# Patient Record
Sex: FEMALE | Race: WHITE | Employment: UNEMPLOYED | URBAN - METROPOLITAN AREA
[De-identification: names, ages, dates, MRNs, and addresses within clinical notes are randomized per-mention and may not be internally consistent; named-entity substitution may affect disease eponyms.]

---

## 2023-02-12 ENCOUNTER — APPOINTMENT (OUTPATIENT)
Dept: CT IMAGING | Age: 68
DRG: 885 | End: 2023-02-12
Attending: STUDENT IN AN ORGANIZED HEALTH CARE EDUCATION/TRAINING PROGRAM
Payer: MEDICARE

## 2023-02-12 ENCOUNTER — HOSPITAL ENCOUNTER (INPATIENT)
Age: 68
LOS: 4 days | Discharge: PSYCHIATRIC OR SUBSTANCE USE FACILITY | DRG: 885 | End: 2023-02-17
Attending: STUDENT IN AN ORGANIZED HEALTH CARE EDUCATION/TRAINING PROGRAM | Admitting: FAMILY MEDICINE
Payer: MEDICARE

## 2023-02-12 DIAGNOSIS — F05 ACUTE CONFUSIONAL STATE: ICD-10-CM

## 2023-02-12 DIAGNOSIS — R48.8 PERSEVERATION: ICD-10-CM

## 2023-02-12 DIAGNOSIS — R48.1: ICD-10-CM

## 2023-02-12 DIAGNOSIS — R41.3 ACUTE MEMORY IMPAIRMENT: Primary | ICD-10-CM

## 2023-02-12 LAB
ALBUMIN SERPL-MCNC: 4.1 G/DL (ref 3.5–5)
ALBUMIN/GLOB SERPL: 1.1 (ref 1.1–2.2)
ALP SERPL-CCNC: 51 U/L (ref 45–117)
ALT SERPL-CCNC: 43 U/L (ref 12–78)
ANION GAP SERPL CALC-SCNC: 5 MMOL/L (ref 5–15)
APPEARANCE UR: CLEAR
AST SERPL-CCNC: 33 U/L (ref 15–37)
BACTERIA URNS QL MICRO: NEGATIVE /HPF
BASOPHILS # BLD: 0.1 K/UL (ref 0–0.1)
BASOPHILS NFR BLD: 1 % (ref 0–1)
BILIRUB SERPL-MCNC: 1.3 MG/DL (ref 0.2–1)
BILIRUB UR QL: NEGATIVE
BUN SERPL-MCNC: 21 MG/DL (ref 6–20)
BUN/CREAT SERPL: 25 (ref 12–20)
CA-I BLD-MCNC: 1.22 MMOL/L (ref 1.12–1.32)
CALCIUM SERPL-MCNC: 9.5 MG/DL (ref 8.5–10.1)
CHLORIDE BLD-SCNC: 104 MMOL/L (ref 98–107)
CHLORIDE SERPL-SCNC: 106 MMOL/L (ref 97–108)
CO2 SERPL-SCNC: 27 MMOL/L (ref 21–32)
COLOR UR: NORMAL
COMMENT, HOLDF: NORMAL
CREAT BLD-MCNC: 0.7 MG/DL (ref 0.6–1.3)
CREAT SERPL-MCNC: 0.84 MG/DL (ref 0.55–1.02)
DIFFERENTIAL METHOD BLD: ABNORMAL
EOSINOPHIL # BLD: 0.2 K/UL (ref 0–0.4)
EOSINOPHIL NFR BLD: 2 % (ref 0–7)
EPITH CASTS URNS QL MICRO: NORMAL /LPF
ERYTHROCYTE [DISTWIDTH] IN BLOOD BY AUTOMATED COUNT: 13.5 % (ref 11.5–14.5)
GLOBULIN SER CALC-MCNC: 3.6 G/DL (ref 2–4)
GLUCOSE BLD STRIP.AUTO-MCNC: 130 MG/DL (ref 65–117)
GLUCOSE BLD-MCNC: 122 MG/DL (ref 65–100)
GLUCOSE SERPL-MCNC: 122 MG/DL (ref 65–100)
GLUCOSE UR STRIP.AUTO-MCNC: NEGATIVE MG/DL
HCT VFR BLD AUTO: 36.4 % (ref 35–47)
HGB BLD-MCNC: 11.8 G/DL (ref 11.5–16)
HGB UR QL STRIP: NEGATIVE
HYALINE CASTS URNS QL MICRO: NORMAL /LPF (ref 0–5)
IMM GRANULOCYTES # BLD AUTO: 0 K/UL (ref 0–0.04)
IMM GRANULOCYTES NFR BLD AUTO: 1 % (ref 0–0.5)
INR PPP: 1 (ref 0.9–1.1)
KETONES UR QL STRIP.AUTO: NEGATIVE MG/DL
LEUKOCYTE ESTERASE UR QL STRIP.AUTO: NEGATIVE
LYMPHOCYTES # BLD: 0.9 K/UL (ref 0.8–3.5)
LYMPHOCYTES NFR BLD: 14 % (ref 12–49)
MCH RBC QN AUTO: 32.1 PG (ref 26–34)
MCHC RBC AUTO-ENTMCNC: 32.4 G/DL (ref 30–36.5)
MCV RBC AUTO: 98.9 FL (ref 80–99)
MONOCYTES # BLD: 0.6 K/UL (ref 0–1)
MONOCYTES NFR BLD: 9 % (ref 5–13)
NEUTS SEG # BLD: 4.8 K/UL (ref 1.8–8)
NEUTS SEG NFR BLD: 73 % (ref 32–75)
NITRITE UR QL STRIP.AUTO: NEGATIVE
NRBC # BLD: 0 K/UL (ref 0–0.01)
NRBC BLD-RTO: 0 PER 100 WBC
PH UR STRIP: 7 (ref 5–8)
PLATELET # BLD AUTO: 308 K/UL (ref 150–400)
PMV BLD AUTO: 10.3 FL (ref 8.9–12.9)
POTASSIUM BLD-SCNC: 3.7 MMOL/L (ref 3.5–5.1)
POTASSIUM SERPL-SCNC: 3.9 MMOL/L (ref 3.5–5.1)
PROT SERPL-MCNC: 7.7 G/DL (ref 6.4–8.2)
PROT UR STRIP-MCNC: NEGATIVE MG/DL
PROTHROMBIN TIME: 10.7 SEC (ref 9–11.1)
RBC # BLD AUTO: 3.68 M/UL (ref 3.8–5.2)
RBC #/AREA URNS HPF: NORMAL /HPF (ref 0–5)
SAMPLES BEING HELD,HOLD: NORMAL
SERVICE CMNT-IMP: ABNORMAL
SERVICE CMNT-IMP: ABNORMAL
SODIUM BLD-SCNC: 141 MMOL/L (ref 136–145)
SODIUM SERPL-SCNC: 138 MMOL/L (ref 136–145)
UR CULT HOLD, URHOLD: NORMAL
UROBILINOGEN UR QL STRIP.AUTO: 0.2 EU/DL (ref 0.2–1)
WBC # BLD AUTO: 6.5 K/UL (ref 3.6–11)
WBC URNS QL MICRO: NORMAL /HPF (ref 0–4)

## 2023-02-12 PROCEDURE — 80053 COMPREHEN METABOLIC PANEL: CPT

## 2023-02-12 PROCEDURE — 82962 GLUCOSE BLOOD TEST: CPT

## 2023-02-12 PROCEDURE — 74011000636 HC RX REV CODE- 636: Performed by: RADIOLOGY

## 2023-02-12 PROCEDURE — 93005 ELECTROCARDIOGRAM TRACING: CPT

## 2023-02-12 PROCEDURE — 70450 CT HEAD/BRAIN W/O DYE: CPT

## 2023-02-12 PROCEDURE — 70496 CT ANGIOGRAPHY HEAD: CPT

## 2023-02-12 PROCEDURE — 80047 BASIC METABLC PNL IONIZED CA: CPT

## 2023-02-12 PROCEDURE — 85610 PROTHROMBIN TIME: CPT

## 2023-02-12 PROCEDURE — 36415 COLL VENOUS BLD VENIPUNCTURE: CPT

## 2023-02-12 PROCEDURE — 4A03X5D MEASUREMENT OF ARTERIAL FLOW, INTRACRANIAL, EXTERNAL APPROACH: ICD-10-PCS | Performed by: STUDENT IN AN ORGANIZED HEALTH CARE EDUCATION/TRAINING PROGRAM

## 2023-02-12 PROCEDURE — 81001 URINALYSIS AUTO W/SCOPE: CPT

## 2023-02-12 PROCEDURE — 99285 EMERGENCY DEPT VISIT HI MDM: CPT

## 2023-02-12 PROCEDURE — 85025 COMPLETE CBC W/AUTO DIFF WBC: CPT

## 2023-02-12 RX ADMIN — IOPAMIDOL 100 ML: 755 INJECTION, SOLUTION INTRAVENOUS at 20:19

## 2023-02-13 ENCOUNTER — APPOINTMENT (OUTPATIENT)
Dept: MRI IMAGING | Age: 68
DRG: 885 | End: 2023-02-13
Attending: STUDENT IN AN ORGANIZED HEALTH CARE EDUCATION/TRAINING PROGRAM
Payer: MEDICARE

## 2023-02-13 PROBLEM — R41.82 ALTERED MENTAL STATUS: Status: ACTIVE | Noted: 2023-02-13

## 2023-02-13 LAB
AMPHET UR QL SCN: NEGATIVE
BARBITURATES UR QL SCN: NEGATIVE
BENZODIAZ UR QL: NEGATIVE
CANNABINOIDS UR QL SCN: NEGATIVE
COCAINE UR QL SCN: NEGATIVE
COMMENT, HOLDF: NORMAL
DRUG SCRN COMMENT,DRGCM: NORMAL
FOLATE SERPL-MCNC: 31.4 NG/ML (ref 5–21)
METHADONE UR QL: NEGATIVE
OPIATES UR QL: NEGATIVE
PCP UR QL: NEGATIVE
SAMPLES BEING HELD,HOLD: NORMAL
TSH SERPL DL<=0.05 MIU/L-ACNC: 1.75 UIU/ML (ref 0.36–3.74)
VIT B12 SERPL-MCNC: 317 PG/ML (ref 193–986)

## 2023-02-13 PROCEDURE — 82746 ASSAY OF FOLIC ACID SERUM: CPT

## 2023-02-13 PROCEDURE — 70551 MRI BRAIN STEM W/O DYE: CPT

## 2023-02-13 PROCEDURE — 97165 OT EVAL LOW COMPLEX 30 MIN: CPT

## 2023-02-13 PROCEDURE — 74011250637 HC RX REV CODE- 250/637: Performed by: FAMILY MEDICINE

## 2023-02-13 PROCEDURE — 97535 SELF CARE MNGMENT TRAINING: CPT

## 2023-02-13 PROCEDURE — 80307 DRUG TEST PRSMV CHEM ANLYZR: CPT

## 2023-02-13 PROCEDURE — 84443 ASSAY THYROID STIM HORMONE: CPT

## 2023-02-13 PROCEDURE — 97161 PT EVAL LOW COMPLEX 20 MIN: CPT

## 2023-02-13 PROCEDURE — 65270000029 HC RM PRIVATE

## 2023-02-13 PROCEDURE — 36415 COLL VENOUS BLD VENIPUNCTURE: CPT

## 2023-02-13 PROCEDURE — 74011250637 HC RX REV CODE- 250/637: Performed by: PHYSICIAN ASSISTANT

## 2023-02-13 PROCEDURE — 92523 SPEECH SOUND LANG COMPREHEN: CPT

## 2023-02-13 PROCEDURE — 99222 1ST HOSP IP/OBS MODERATE 55: CPT | Performed by: PSYCHIATRY & NEUROLOGY

## 2023-02-13 PROCEDURE — 97530 THERAPEUTIC ACTIVITIES: CPT

## 2023-02-13 PROCEDURE — 82607 VITAMIN B-12: CPT

## 2023-02-13 RX ORDER — ATORVASTATIN CALCIUM 10 MG/1
40 TABLET, FILM COATED ORAL
Status: DISCONTINUED | OUTPATIENT
Start: 2023-02-13 | End: 2023-02-13

## 2023-02-13 RX ORDER — FOLIC ACID 1 MG/1
3 TABLET ORAL DAILY
COMMUNITY

## 2023-02-13 RX ORDER — LEUCOVORIN CALCIUM 5 MG/1
TABLET ORAL
COMMUNITY

## 2023-02-13 RX ORDER — ACETAMINOPHEN 325 MG/1
650 TABLET ORAL
Status: DISCONTINUED | OUTPATIENT
Start: 2023-02-13 | End: 2023-02-17 | Stop reason: HOSPADM

## 2023-02-13 RX ORDER — ACETAMINOPHEN 650 MG/1
650 SUPPOSITORY RECTAL
Status: DISCONTINUED | OUTPATIENT
Start: 2023-02-13 | End: 2023-02-17 | Stop reason: HOSPADM

## 2023-02-13 RX ORDER — LISINOPRIL 30 MG/1
30 TABLET ORAL DAILY
COMMUNITY

## 2023-02-13 RX ORDER — ENOXAPARIN SODIUM 100 MG/ML
40 INJECTION SUBCUTANEOUS EVERY 24 HOURS
Status: DISCONTINUED | OUTPATIENT
Start: 2023-02-14 | End: 2023-02-17 | Stop reason: HOSPADM

## 2023-02-13 RX ORDER — METHOTREXATE 2.5 MG/1
15 TABLET ORAL
COMMUNITY

## 2023-02-13 RX ORDER — FOLIC ACID 1 MG/1
3 TABLET ORAL DAILY
Status: DISCONTINUED | OUTPATIENT
Start: 2023-02-13 | End: 2023-02-17 | Stop reason: HOSPADM

## 2023-02-13 RX ADMIN — ATORVASTATIN CALCIUM 40 MG: 10 TABLET, FILM COATED ORAL at 05:16

## 2023-02-13 RX ADMIN — LISINOPRIL 30 MG: 20 TABLET ORAL at 08:44

## 2023-02-13 RX ADMIN — FOLIC ACID 3 MG: 1 TABLET ORAL at 13:14

## 2023-02-13 NOTE — PROGRESS NOTES
Problem: Mobility Impaired (Adult and Pediatric)  Goal: *Acute Goals and Plan of Care (Insert Text)  Description: FUNCTIONAL STATUS PRIOR TO ADMISSION: Patient was independent and active without use of DME.    HOME SUPPORT PRIOR TO ADMISSION: The patient lived alone. Physical Therapy Goals  Initiated 2/13/2023  1. Patient will perform sit to stand with supervision within 7 day(s). 2.  Patient will ambulate with supervision for 300 feet with the least restrictive device within 7 day(s). 3.  Patient will ascend/descend 14 stairs with  handrail(s) with supervision within 7 day(s). Outcome: Not Met     PHYSICAL THERAPY EVALUATION  Patient: Tereso Castillo (69 y.o. female)  Date: 2/13/2023  Primary Diagnosis: Altered mental status [R41.82]       Precautions:  Fall    ASSESSMENT  Based on the objective data described below, the patient presents with impaired functional mobility related to her AMS. At baseline pt is reported to be independent w/o a device, exercises with a , lives alone, and is A&Ox4. Received pt in the ED with her sister-in-law in the room. Pt is confused and repeating \"I am dead\". She follows commands with some difficulty and often stated \"I can't\" when asked to perform a task then later completing the task. She raises her arms above her head then unable to lower them until provided w/ tactile cues. Pt's strength and ROM are grossly functional.  Functionally she is SBA to Min A for transfers and ambulation (Min A to initiate movement/ tasks). Patient requested bilateral hand held assist to walk to the bathroom. Observed her ambulating in the bathroom w/o external support and with steady gait. She did have one episode of unsteadiness w/ path deviation when turning the corner to walk back to her room. She was able to self correct w/o loss of balance. Pt's sister in law indicates gait observed is not pt's baseline. Therapy will follow for a few sessions.   Not sure of the impact we will have given pt's AMS. Current Level of Function Impacting Discharge (mobility/balance): Essentially SBA to ambulate though pt requested bilateral hand held assist to ambulate to the bathroom then later ambulated back to the room with SBA, mild path deviation w turn,  no overt loss of balance. Functional Outcome Measure: The patient scored 21/28 on the Tinetti outcome measure which is indicative of moderate fall risk. Other factors to consider for discharge: lives alone, appears to have supportive family, mentation     Patient will benefit from skilled therapy intervention to address the above noted impairments. PLAN :  Recommendations and Planned Interventions: transfer training, gait training, neuromuscular re-education, patient and family training/education, and therapeutic activities      Frequency/Duration: Patient will be followed by physical therapy:  3 times a week to address goals. Recommendation for discharge: (in order for the patient to meet his/her long term goals)  To be determined: Anticipate no therapy follow up needs    This discharge recommendation:  Has not yet been discussed the attending provider and/or case management    IF patient discharges home will need the following DME: none anticipated         SUBJECTIVE:   Patient stated I am dead.     OBJECTIVE DATA SUMMARY:   HISTORY:    History reviewed. No pertinent past medical history. History reviewed. No pertinent surgical history.     Home Situation  Home Environment: Private residence  # Steps to Enter: 6  Rails to Enter: Yes  One/Two Story Residence: Other (Comment) (3 story home; bedroom on 2nd floor)  # of Interior Steps: 14  Living Alone: Yes  Support Systems: Other Family Member(s) (sister at bedside)  Patient Expects to be Discharged to[de-identified] Home  Current DME Used/Available at Home:  (shower bench)  Tub or Shower Type: Shower    EXAMINATION/PRESENTATION/DECISION MAKING:   Critical Behavior:  Neurologic State: Alert  Orientation Level: Oriented to person, Disoriented to situation, Disoriented to time, Disoriented to place  Cognition: Impaired decision making, Decreased command following  Safety/Judgement: Decreased awareness of environment, Decreased awareness of need for assistance, Decreased awareness of need for safety, Decreased insight into deficits  Hearing: Auditory  Auditory Impairment: None    Range Of Motion:  AROM: Within functional limits           PROM: Within functional limits           Strength:    Strength: Within functional limits                    Tone & Sensation:   Tone: Normal              Sensation: Intact               Coordination:  Coordination: Within functional limits  Vision:   Acuity: Within Defined Limits  Functional Mobility:  Bed Mobility:  Rolling: Independent  Supine to Sit: Independent  Sit to Supine: Independent  Scooting: Independent  Transfers:  Sit to Stand: Stand-by assistance  Stand to Sit: Stand-by assistance  Bed to Chair: Stand-by assistance  Cues required in the bathroom to improve safety with sit to stand. Cues required for hand washing to operate the faucet and soap dispenser. Pt managed toilet paper, hygiene, and clothing on her own w/ SBA.         Balance:   Sitting: Intact  Standing: Impaired  Standing - Static: Good  Standing - Dynamic : Good  Ambulation/Gait Training:  Distance (ft): 80 Feet (ft) (40 ft x2)  Assistive Device: None  however pt requested bilateral hand held assist of this PT and her sister-in-law walking to the bathroom, ambulated throughout the bathroom with no assist & steady gait; ambulated back to the room with close SBA, one path deviation when turning the corner, no loss of balance  Ambulation - Level of Assistance: Minimal assistance;Stand-by assistance (variable)  Gait Description (WDL): Exceptions to WDL  Speed/Angelia: Slow, one path deviation   Step Length: Right shortened;Left shortened (improved when ambulating back to the room) Functional Measure:  Tinetti test:    Sitting Balance: 1  Arises: 1  Attempts to Rise: 2  Immediate Standing Balance: 2  Standing Balance: 2  Nudged: 2 (inferred)  Eyes Closed: 1 (inferred)  Turn 360 Degrees - Continuous/Discontinuous: 1  Turn 360 Degrees - Steady/Unsteady: 1  Sitting Down: 1  Balance Score: 14 Balance total score  Indication of Gait: 1  R Step Length/Height: 1  L Step Length/Height: 1  R Foot Clearance: 1  L Foot Clearance: 1  Step Symmetry: 1  Step Continuity: 1  Path: 1  Trunk: 1  Walking Time: 1  Gait Score: 10 Gait total score  Total Score: 24/28 Overall total score         Tinetti Tool Score Risk of Falls  <19 = High Fall Risk  19-24 = Moderate Fall Risk  25-28 = Low Fall Risk  Tinetti ME. Performance-Oriented Assessment of Mobility Problems in Elderly Patients. Cox 66; [id number].  (Scoring Description: PT Bulletin Feb. 10, 1993)    Older adults: Shawna West et al, 2009; n = 1000 [facility] elderly evaluated with ABC, NICOLAS, ADL, and IADL)  · Mean NICOLAS score for males aged 69-68 years = 26.21(3.40)  · Mean NICOLAS score for females age 69-68 years = 25.16(4.30)  · Mean NICOLAS score for males over 80 years = 23.29(6.02)  · Mean NICOLAS score for females over 80 years = 17.20(8.32)                Physical Therapy Evaluation Charge Determination   History Examination Presentation Decision-Making   LOW Complexity : Zero comorbidities / personal factors that will impact the outcome / POC LOW Complexity : 1-2 Standardized tests and measures addressing body structure, function, activity limitation and / or participation in recreation  MEDIUM Complexity : Evolving with changing characteristics  LOW Complexity : FOTO score of       Based on the above components, the patient evaluation is determined to be of the following complexity level: LOW     Pain Rating:  None indicated    Activity Tolerance:   Good    After treatment patient left in no apparent distress:   Supine in bed, Call bell within reach, Bed / chair alarm activated, Caregiver / family present, Side rails x 2 in the ED, and RN aware    COMMUNICATION/EDUCATION:   The patients plan of care was discussed with: Occupational therapist and Registered nurse. Patient is unable to participate in goal setting and plan of care.     Thank you for this referral.  Faraz Cannon, PT   Time Calculation: 27 mins

## 2023-02-13 NOTE — PROGRESS NOTES
Hospitalist Progress Note  Yoan Dorado PA-C  Answering service: [phone] OR [phone] from in house phone        Date of Service:  2023  NAME:  Bhanu Mendez  :  1955  MRN:  [id number]      Admission Summary:   Bhanu Mendez is a 78 y.o. 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 Neurology consulted. Interval history / Subjective:   Patient seen in the morning at ~ 0830. She reports that she is feeling unwell, that things feel weird, and that she is in a dream state. She forgets things she is saying instantly after saying them. She feels like she is dead, and this is all occurring around her as she is dead. At times she mentions she cannot move certain limbs, but then can move them spontaneously. She is unable to tell me the medications she takes or other medical history. She said her sister-in-law helped get her to the hospital due to the illness. Patient seen in the early afternoon with her sister-in-law at bedside. Patient has been taking all of her medications on her own so is not sure about all the medicines she takes. She does have her current medications with her, including MTX 2.5mg and to take 6 tablets once a week, Leucovorin to take for 2 days after MTX, FA 3mg daily, and Lisinopril 30mg daily. She is unsure if there has been any changes to her medications recently, but the patient's friend believes there may have been a new addition. I reviewed her labs, imaging, and Neurology assessment pending. Sister-in-law does not believe that the patient inadvertently took too much of her medication.  She reports the patient does not drink and at baseline is extremely high function and was the  for the [address]. I called Dr. Azul Beck office at [facility]. With a member of his team reviewed her medications and they relayed that what is prescribed by his team is Leucovorin, MTX, and FA. She was previously on Prednisone, but this was a short course and not currently on. She notes her PCP, Dr. Surya Small, has her on Lisinopril and prescribed her Ativan in August.      Assessment & Plan:     Naheed Poole 121 Neurology involvement. Will consult with Psychiatry on 2/14  -- Notable labs: WBC 6.5, H/H of 11.8/36.4, Plt 308, Na 138, SCr 0.84, ALT/AST 43/33, T Bili 1.3, Alk Phos 51, INR 1.0, UA negative, TSH 1.75, folate 31.4, Vitamin B12 317, UDS negative  -- Notable imaging: CT Head, CTA H/N, MRI Brain all with no acute intracranial process  -- Patient can transfer to medical bed and off telemetry. Does not need TTE  -- Unclear cause at this current time    Menière Disease  -- Regimen includes weekly MTX followed by 2 days of Leucovorin  -- No evidence of MTX toxicity in lab work-  -- Hold for now    HTN  -- Continue home Lisinopril 30mg daily     Hx of Ocular Migraines  -- No current exacerbation    Code status: Full Code  Prophylaxis: Start LVX  Care Plan discussed with: Pt, RN, Dr. Keiko Dhaliwal, family  Anticipated Disposition: TBD     Hospital Problems  Never Reviewed            Codes Class Noted POA    Altered mental status ICD-10-CM: R41.82  ICD-9-CM: 780.97  2/13/2023 Unknown         Review of Systems:   A comprehensive review of systems was negative except for that written in the HPI. Vital Signs:    Last 24hrs VS reviewed since prior progress note.  Most recent are:  Visit Vitals  BP (!) 148/92   Pulse 73   Temp 98 °F (36.7 °C)   Resp 13   Ht 5' 8\" (1.727 m)   Wt 52.2 kg (115 lb)   SpO2 98%   BMI 17.49 kg/m²       No intake or output data in the 24 hours ending 02/13/23 1614     Physical Examination:     I had a face to face encounter with this patient and independently examined them on 2/13/2023 as outlined below:          General : Awake, alert. Orientation questions says \"I think they told me I'm at [facility]. \" \"I keep getting this wrong, I thin 2023. \" \"February. \" Says \"have no clue\" on day of week/month/President. Saying things like she needs to go to the bathroom and then immediately saying she does not have to  HEENT: PEERL, EOMI, moist mucus membrane  Neck: supple  Chest: Clear to auscultation bilaterally   CVS: RRR, no murmurs appreciated   Abd: soft/ non tender, non distended, BS physiological,   Ext: No edema  Neuro/Psych: CN 2-12 grossly intact. Sensation intact to light touch with no extinction. Moves all extremities spontaneously antigravity and good strength, occasionally saying that she cannot move that extremity but then spontaneously moves   Skin: warm            Data Review:    Review and/or order of clinical lab test  Review and/or order of tests in the radiology section of CPT  Review and/or order of tests in the medicine section of CPT    I have independently reviewed and interpreted patient's lab and all other diagnostic data    Notes reviewed from all clinical/nonclinical/nursing services involved in patient's clinical care. Care coordination discussions were held with appropriate clinical/nonclinical/ nursing providers based on care coordination needs. Labs:     Recent Labs     02/12/23 2017   WBC 6.5   HGB 11.8   HCT 36.4        Recent Labs     02/12/23 2017      K 3.9      CO2 27   BUN 21*   CREA 0.84   *   CA 9.5     Recent Labs     02/12/23 2017   ALT 43   AP 51   TBILI 1.3*   TP 7.7   ALB 4.1   GLOB 3.6     Recent Labs     02/12/23 2017   INR 1.0   PTP 10.7      No results for input(s): FE, TIBC, PSAT, FERR in the last 72 hours. Lab Results   Component Value Date/Time    Folate 31.4 (H) 02/13/2023 01:10 PM      No results for input(s): PH, PCO2, PO2 in the last 72 hours.   No results for input(s): CPK, CKNDX, TROIQ in the last 72 hours. No lab exists for component: CPKMB  No results found for: CHOL, CHOLX, CHLST, CHOLV, HDL, HDLP, LDL, LDLC, DLDLP, TGLX, TRIGL, TRIGP, CHHD, CHHDX  Lab Results   Component Value Date/Time    Glucose (POC) 122 (H) 02/12/2023 08:17 PM    Glucose (POC) 130 (H) 02/12/2023 08:00 PM     Lab Results   Component Value Date/Time    Color YELLOW/STRAW 02/12/2023 09:54 PM    Appearance CLEAR 02/12/2023 09:54 PM    pH (UA) 7.0 02/12/2023 09:54 PM    Protein Negative 02/12/2023 09:54 PM    Glucose Negative 02/12/2023 09:54 PM    Ketone Negative 02/12/2023 09:54 PM    Bilirubin Negative 02/12/2023 09:54 PM    Urobilinogen 0.2 02/12/2023 09:54 PM    Nitrites Negative 02/12/2023 09:54 PM    Leukocyte Esterase Negative 02/12/2023 09:54 PM    Epithelial cells FEW 02/12/2023 09:54 PM    Bacteria Negative 02/12/2023 09:54 PM    WBC 0-4 02/12/2023 09:54 PM    RBC 0-5 02/12/2023 09:54 PM         Medications Reviewed:     Current Facility-Administered Medications   Medication Dose Route Frequency    acetaminophen (TYLENOL) tablet 650 mg  650 mg Oral Q4H PRN    Or    acetaminophen (TYLENOL) solution 650 mg  650 mg Per NG tube Q4H PRN    Or    acetaminophen (TYLENOL) suppository 650 mg  650 mg Rectal Q4H PRN    lisinopriL (PRINIVIL, ZESTRIL) tablet 30 mg  30 mg Oral DAILY    folic acid (FOLVITE) tablet 3 mg  3 mg Oral DAILY     Current Outpatient Medications   Medication Sig    lisinopriL (PRINIVIL, ZESTRIL) 30 mg tablet Take 30 mg by mouth daily. leucovorin calcium (WELLCOVORIN) 5 mg tablet Take  by mouth two (2) times a week. methotrexate (RHEUMATREX) 2.5 mg tablet Take  by mouth two (2) days a week. folic acid (FOLVITE) 1 mg tablet Take 3 mg by mouth daily.      ______________________________________________________________________  EXPECTED LENGTH OF STAY: - - -  ACTUAL LENGTH OF STAY:          0                 Yoan Dorado PA-C

## 2023-02-13 NOTE — ED NOTES
Has a final transfer review been performed? Yes    Reason for Admission: Acute memory impairment  Patient comes from: home  Mental Status: Confused  ADL:partial assistance  Ambulation:needs little to no help but a lot of cues  Pertinent Info/Safety Concerns: patient says a lot that she doesn't remember how to do things  COVID Status: Not Tested  MEWS Score: 1  Sinus Rhythm  Vitals:    02/13/23 1100 02/13/23 1320 02/13/23 1400 02/13/23 1624   BP: (!) 145/86 (!) 157/93 (!) 148/92 (!) 150/91   Pulse: 73 71 73 73   Resp: 16 19 13 16   Temp:    98 °F (36.7 °C)   SpO2: 98% 98% 98% 97%   Weight:       Height:         Lines:   Peripheral IV 02/12/23 Right Antecubital (Active)   Site Assessment Clean, dry, & intact 02/12/23 2047   Phlebitis Assessment 0 02/12/23 2047   Infiltration Assessment 0 02/12/23 2047   Dressing Status Clean, dry, & intact 02/12/23 2047   Dressing Type Transparent 02/12/23 2047   Hub Color/Line Status Pink;Patent; Flushed;Capped 02/12/23 2047   Alcohol Cap Used Yes 02/12/23 2047      Transport mode:Wheelchair    Malu Benoit  being transferred to (unit) for routine progression of care     \"Notification of etransfer note given to Felipe

## 2023-02-13 NOTE — H&P
History and Physical    Date of Service:  2/13/2023  Primary Care Provider: None  Source of information: Chart review    Chief Complaint: Memory Loss      History of Presenting Illness:   Nicko Ma is a 78 y.o. female who presents with altered mental status. Information was obtained from review due to patient unable to tell me what brought her into the hospital.  Per hospital record, patient states that her short term memory has been impaired. It says that she called her sister-in-law because she was worried about this, and her sister-in-law chose to advise her to go to the hospital for further evaluation. She denies any other focal deficits, or complaints. In the ED, BP ws elevated to 190/105. Other VSS. Labs were grossly normal. CT head, Cta head/neck and MRI negative. In the ED, she received lipitor       REVIEW OF SYSTEMS:  Neurological: negative     History reviewed. No pertinent past medical history. History reviewed. No pertinent surgical history. Prior to Admission medications    Not on File     No Known Allergies   History reviewed. No pertinent family history. Social History:     Social Determinants of Health     Tobacco Use: Not on file   Alcohol Use: Not on file   Financial Resource Strain: Not on file   Food Insecurity: Not on file   Transportation Needs: Not on file   Physical Activity: Not on file   Stress: Not on file   Social Connections: Not on file   Intimate Partner Violence: Not on file   Depression: Not on file   Housing Stability: Not on file        Medications were reconciled to the best of my ability given all available resources at the time of admission. Route is PO if not otherwise noted. Family and social history were personally reviewed, all pertinent and relevant details are outlined as above.     Objective:   Visit Vitals  BP (!) 148/88   Pulse 75   Temp 98 °F (36.7 °C)   Resp 16   Ht 5' 8\" (1.727 m)   Wt 52.2 kg (115 lb)   SpO2 97%   BMI 17.49 kg/m²      O2 Device: None (Room air)    PHYSICAL EXAM:   General: Alert x oriented x 3, awake, no acute distress,   HEENT: PEERL, EOMI, moist mucus membranes  Neck: Supple, no JVD, no meningeal signs  Chest: Clear to auscultation bilaterally   CVS: RRR, S1 S2 heard, no murmurs/rubs/gallops  Abd: Soft, non-tender, non-distended, +bowel sounds   Ext: No clubbing, no cyanosis, no edema  Neuro/Psych: Pleasant mood and affect, CN 2-12 grossly intact, sensory grossly within normal limit, Strength 5/5 in all extremities,  Cap refill: Brisk, less than 3 seconds  Pulses: 2+, radial pulses  Skin: Warm, dry, without rashes or lesions    Data Review:   I have independently reviewed and interpreted patient's lab and all other diagnostic data    Abnormal Labs Reviewed   CBC WITH AUTOMATED DIFF - Abnormal; Notable for the following components:       Result Value    RBC 3.68 (*)     IMMATURE GRANULOCYTES 1 (*)     All other components within normal limits   METABOLIC PANEL, COMPREHENSIVE - Abnormal; Notable for the following components:    Glucose 122 (*)     BUN 21 (*)     BUN/Creatinine ratio 25 (*)     Bilirubin, total 1.3 (*)     All other components within normal limits   GLUCOSE, POC - Abnormal; Notable for the following components:    Glucose (POC) 130 (*)     All other components within normal limits   POC CHEM8 - Abnormal; Notable for the following components:    Glucose (POC) 122 (*)     All other components within normal limits       All Micro Results       Procedure Component Value Units Date/Time    URINE CULTURE HOLD SAMPLE [[id number]] Collected: 02/12/23 2154    Order Status: Completed Specimen: Urine Updated: 02/12/23 2200     Urine culture hold       Urine on hold in Microbiology dept for 2 days. If unpreserved urine is submitted, it cannot be used for addtional testing after 24 hours, recollection will be required.                   IMAGING:   MRI BRAIN WO CONT   Final Result   Normal MRI of the brain for patient age.   There is no intracranial mass, hemorrhage or evidence of acute infarction. No acute intracranial process is demonstrated. CTA HEAD NECK W CONT   Final Result   Negative. CT CODE NEURO HEAD WO CONTRAST   Final Result   Negative. ECG/ECHO:    Results for orders placed or performed during the hospital encounter of 02/12/23   EKG, 12 LEAD, INITIAL   Result Value Ref Range    Ventricular Rate 97 BPM    Atrial Rate 97 BPM    P-R Interval 150 ms    QRS Duration 70 ms    Q-T Interval 364 ms    QTC Calculation (Bezet) 462 ms    Calculated P Axis 72 degrees    Calculated R Axis 60 degrees    Calculated T Axis 53 degrees    Diagnosis       Normal sinus rhythm  Right atrial enlargement  Borderline ECG  No previous ECGs available            Notes reviewed from all clinical/nonclinical/nursing services involved in patient's clinical care. Care coordination discussions were held with appropriate clinical/nonclinical/ nursing providers based on care coordination needs. Assessment:   Given the patient's current clinical presentation, there is a high level of concern for decompensation if discharged from the emergency department. Complex decision making was performed, which includes reviewing the patient's available past medical records, laboratory results, and imaging studies. Active Problems:    Altered mental status (2/13/2023)        Plan:     #altered mental status  -etiology unclear  -labs and CT head/ CT head nec, and MRI are negative for acute findings  -neuro is consulted  - check UA, B12, folate, TSH, and UDS      DIET: ADULT DIET Regular; 5 carb choices (75 gm/meal); Low Fat/Low Chol/High Fiber/2 gm Na; Low Sodium (2 gm)   ISOLATION PRECAUTIONS: There are currently no Active Isolations  CODE STATUS: Full Code   DVT PROPHYLAXIS: SCDs, ambulatroy  FUNCTIONAL STATUS PRIOR TO HOSPITALIZATION: Fully active and ambulatory; able to carry on all self-care without restriction.   Ambulatory status/function: By self   EARLY MOBILITY ASSESSMENT: Recommend routine ambulation while hospitalized with the assistance of nursing staff  ANTICIPATED DISCHARGE: 24-48 hours. ANTICIPATED DISPOSITION: Home  EMERGENCY CONTACT/SURROGATE DECISION MAKER: Katelyn Peguero (brother)    Signed By: Bharath Villagran MD     February 13, 2023         Please note that this dictation may have been completed with Dragon, the computer voice recognition software. Quite often unanticipated grammatical, syntax, homophones, and other interpretive errors are inadvertently transcribed by the computer software. Please disregard these errors. Please excuse any errors that have escaped final proofreading.

## 2023-02-13 NOTE — ED TRIAGE NOTES
Pt arrives with cc of memory issues. She feels her short term memory has been off all day today. She is able to tell me that she called her sister in law to come over because she was worried. The sister in law states she just isnt herself. Pt has no other complaints today aside from her \"brain spinning\" meaning that she gets thoughts in her head that just get stuck in her head. Then she needs to think of something else to get the other thought unstuck.      Hx of ocular migraines and vertigo

## 2023-02-13 NOTE — ED PROVIDER NOTES
Uma Paul is a 78 y.o. female with past medical history notable for steroid responsive vertigo, taking methotrexate, leucovorin and folic acid, hypertension on lisinopril 30 mg presenting with cognitive impairment, acute onset this afternoon. Exact time of onset is not clear but patient states that it started in the afternoon. She presents with her sister-in-law who provides additional history. Sister-in-law states that the patient does not have any baseline cognitive deficit. She states that today \"her mind was spinning\" by which she means that she has had perseveration on certain thoughts and short-term memory deficit. She has had associated difficulty with time perception. For example she feels that when I reevaluate her after over an hour she felt that she saw me about 5 minutes ago. Memory Loss   Associated symptoms include confusion. Functional status baseline:  [EPIC#1537^NOTE}      History reviewed. No pertinent past medical history. History reviewed. No pertinent surgical history. History reviewed. No pertinent family history.     Social History     Socioeconomic History    Marital status: SINGLE     Spouse name: Not on file    Number of children: Not on file    Years of education: Not on file    Highest education level: Not on file   Occupational History    Not on file   Tobacco Use    Smoking status: Not on file    Smokeless tobacco: Not on file   Substance and Sexual Activity    Alcohol use: Not on file    Drug use: Not on file    Sexual activity: Not on file   Other Topics Concern    Not on file   Social History Narrative    Not on file     Social Determinants of Health     Financial Resource Strain: Not on file   Food Insecurity: Not on file   Transportation Needs: Not on file   Physical Activity: Not on file   Stress: Not on file   Social Connections: Not on file   Intimate Partner Violence: Not on file   Housing Stability: Not on file         ALLERGIES: Patient has no known allergies. Review of Systems   Constitutional:  Negative for chills and fever. Eyes:  Negative for photophobia. Respiratory:  Negative for shortness of breath. Cardiovascular:  Negative for chest pain. Gastrointestinal:  Negative for abdominal pain and nausea. Genitourinary:  Negative for dysuria. Musculoskeletal:  Negative for back pain. Neurological:  Positive for speech difficulty. Negative for headaches. Psychiatric/Behavioral:  Positive for confusion, decreased concentration and memory loss. Negative for sleep disturbance. The patient is not nervous/anxious. All other systems reviewed and are negative. Vitals:    02/12/23 2002 02/12/23 2047 02/12/23 2117 02/12/23 2122   BP:  (!) 149/83 (!) 160/96    Pulse:  94 99 95   Resp:  16 17 15   Temp:       SpO2:  99% 99% 97%   Weight: 54.2 kg (119 lb 7.8 oz)               Physical Exam  Constitutional:       General: She is not in acute distress. Appearance: She is not toxic-appearing. HENT:      Head: Normocephalic and atraumatic. Mouth/Throat:      Mouth: Mucous membranes are moist.   Eyes:      Extraocular Movements: Extraocular movements intact. Cardiovascular:      Rate and Rhythm: Normal rate and regular rhythm. Heart sounds: Normal heart sounds. Pulmonary:      Effort: Pulmonary effort is normal. No respiratory distress. Breath sounds: Normal breath sounds. Abdominal:      Palpations: Abdomen is soft. Tenderness: There is no abdominal tenderness. Musculoskeletal:      Cervical back: Normal range of motion. Right lower leg: No edema. Left lower leg: No edema. Skin:     Capillary Refill: Capillary refill takes less than 2 seconds. Neurological:      Mental Status: She is alert. Cranial Nerves: No cranial nerve deficit. Sensory: No sensory deficit. Motor: No weakness.       Coordination: Coordination normal.      Comments: Apparently repeating the same question, normal time perception, difficulty with recalling specific events from earlier in the day   Psychiatric:         Attention and Perception: She is inattentive. Mood and Affect: Mood is anxious. Perfect Serve Consult for Admission  9:52 PM    ED Room Number: ER12/12  Patient Name and age:  Carly Benoit 78 y.o.  female  Working Diagnosis:   1. Acute memory impairment    2. Acute confusional state    3. Time agnosia    4. Perseveration        COVID-19 Suspicion:  no  Sepsis present:  no  Reassessment needed: no  Code Status:  Full Code  Readmission: no  Isolation Requirements:  no  Recommended Level of Care:  telemetry  Department: [facility] Adult ED - [phone]  Admitting Provider: Dr John Izquierdo Making  Patient was activated as a stroke alert given the acute neurological change. She does not have focal motor deficit, dysarthria. Her gait is steady. However she does have an acute cognitive change, time agnosia, acute perseveration and confusion. She has not had infectious symptoms such as fever or neck pain. Differential is broad and includes autoimmune encephalitis ((and has history of autoimmune vertigo and hearing loss and is on disease modifying drugs) and was notably hypertensive initially when symptoms were the most severe. She is accompanied by her family members who were uncomfortable with the patient being discharged, remote neurology service recommends admission for observation given that she is still continue to have symptoms. We will order an MRI. Likely would benefit from in person neurology consultation and perhaps EEG to evaluate for temporal lobe focal epilepsy if stroke was ruled out. Amount and/or Complexity of Data Reviewed  Labs: ordered. Radiology: ordered. ECG/medicine tests: ordered. Risk  Prescription drug management. Decision regarding hospitalization.       ED Course as of 02/12/23 2158   [facility] Feb 12, 2023 2013 Discussed with telemetry neuro, recommend CT without and CTA head and neck, MRI. No mention of need for CT perfusion.   I do not see the additional benefit of CT perfusion as the patient is clearly not a thrombectomy candidate.   [NS]    EK: 00  Normal sinus rhythm ventricular rate 97 normal axis no ST elevation. [NS]      ED Course User Index  [NS] Denise Gama MD       Procedures

## 2023-02-13 NOTE — ED NOTES
Bedside and Verbal shift change report given to Claritza, [facility] (oncoming nurse) by Rose Mary Lopez RN (offgoing nurse). Report included the following information SBAR, Kardex, ED Summary, [facility] and Recent Results.

## 2023-02-13 NOTE — PROGRESS NOTES
SPEECH LANGUAGE PATHOLOGY EVALUATION/DISCHARGE  Patient: Dinora Ventura (69 y.o. female)  Date: 2/13/2023  Primary Diagnosis: Altered mental status [R41.82]       Precautions:        ASSESSMENT :  SLP evaluation complete as there was some concern for language deficits. Deficits appear consistent more with cognitive deficits +/- possible psych involvement. Would consider psych consult (?neuropsych?) due to concern for memory loss and inconsistent deficits noted (pt lifted her hand without any assistance and then stated later that she needed help with this). Pt also stated that she thought she was \"dead\" and possibly some ?disassociation saying that it feels that she's watching herself out of her body. No further SLP needs. Skilled acute therapy provided by a speech-language pathologist is not indicated at this time. PLAN :  Recommendations:  No SLP needs. Consider psych consult. Discharge Recommendations: None     SUBJECTIVE:   Patient stated, \"I think I'm dead. OBJECTIVE:   History reviewed. No pertinent past medical history. History reviewed. No pertinent surgical history. Prior Level of Function/Home Situation:   Home Situation  Patient Expects to be Discharged to[de-identified] Home  Mental Status:  Neurologic State: Eyes open spontaneously, Alert  Orientation Level: Confused. Cognition: Follows commands         Pain:  Pain Scale 1: Numeric (0 - 10)  Pain Intensity 1: 0       After treatment:   Call bell within reach and Nursing notified    COMMUNICATION/EDUCATION:       The patient's plan of care including recommendations, planned interventions, and recommended diet changes were discussed with: Registered nurse and PA .        Thank you for this referral.  Gus Sales, SLP  Time Calculation: 15 mins

## 2023-02-13 NOTE — PROGRESS NOTES
OCCUPATIONAL THERAPY EVALUATION/DISCHARGE  Patient: Yudi Herr [de-identified]78 y.o. female)  Date: 2/13/2023  Primary Diagnosis: Altered mental status [R41.82]       Precautions:   Fall    ASSESSMENT  Based on the objective data described below, the patient presents with impaired cognition, decreased short term memory, tangential during conversation, limited insight into situation/deficits, and requires frequent redirection s/p admission to ED for AMS. Patient tolerated toileting and LB dressing tasks without difficulty, mainly limited by decreased sequencing and requiring cues to attend to task. Patient able to perform transfers and mobility in room with SBA - CGA without fatigue or LOB. Patient returned to supine in bed, call bell within reach, bed alarm activated. No further OT needs indicated. Patient may benefit from psych consult (?). Current Level of Function (ADLs/self-care): independent - SPV    Functional Outcome Measure: The patient scored 22/24 on the [facility] outcome measure which is indicative of no OT needs indicated at NH. Other factors to consider for discharge: may benefit from psych consult     PLAN :  Recommend with staff: up with SPV for cognition     Recommendation for discharge: (in order for the patient to meet his/her long term goals)  No skilled occupational therapy/ follow up rehabilitation needs identified at this time. This discharge recommendation:  Has been made in collaboration with the attending provider and/or case management    IF patient discharges home will need the following DME: none       SUBJECTIVE:   Patient stated you keep changing faces.    \"im dead, I just know I am dead\"    OBJECTIVE DATA SUMMARY:   HISTORY:   History reviewed. No pertinent past medical history. History reviewed. No pertinent surgical history.     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, driving  Expanded or extensive additional review of patient history:   Home Situation  Home Environment: Private residence  # Steps to Enter: 6  Rails to Enter: Yes  One/Two Story Residence: Other (Comment) (3 story home; bedroom on 2nd floor)  # of Interior Steps: 14  Living Alone: Yes  Support Systems: Other Family Member(s) (sister at bedside)  Patient Expects to be Discharged to[de-identified] Home  Current DME Used/Available at Home:  (shower bench)  Tub or Shower Type: Shower      EXAMINATION OF PERFORMANCE DEFICITS:  Cognitive/Behavioral Status:  Neurologic State: Alert  Orientation Level: Oriented to person;Disoriented to situation;Disoriented to time;Disoriented to place  Cognition: Impaired decision making;Decreased command following  Perception: Appears intact  Perseveration: Perseverates during conversation;Perseverates during ADLS  Safety/Judgement: Decreased awareness of environment;Decreased awareness of need for assistance;Decreased awareness of need for safety;Decreased insight into deficits      Hearing: Auditory  Auditory Impairment: None    Vision/Perceptual:                           Acuity: Within Defined Limits         Range of Motion:    AROM: Within functional limits  PROM: Within functional limits                      Strength:    Strength: Within functional limits                Coordination:  Coordination: Within functional limits  Fine Motor Skills-Upper: Left Intact; Right Intact    Gross Motor Skills-Upper: Left Intact; Right Intact    Tone & Sensation:    Tone: Normal  Sensation: Intact                      Balance:  Sitting: Intact  Standing: Impaired  Standing - Static: Good  Standing - Dynamic : Good    Functional Mobility and Transfers for ADLs:  Bed Mobility:  Rolling: Independent  Supine to Sit: Independent  Sit to Supine: Independent  Scooting: Independent    Transfers:  Sit to Stand: Stand-by assistance  Stand to Sit: Stand-by assistance  Bed to Chair: Stand-by assistance  Toilet Transfer : Contact guard assistance    ADL Assessment:  Feeding: Independent    Oral Facial Hygiene/Grooming: Independent    Bathing: Supervision         Upper Body Dressing: Independent    Lower Body Dressing: Independent    Toileting: Supervision                ADL Intervention and task modifications:       Lower Body Dressing Assistance  Socks: Independent  Shoes with Cloth Laces: Minimum assistance (limited by decreased cognition)         Cognitive Retraining  Safety/Judgement: Decreased awareness of environment;Decreased awareness of need for assistance;Decreased awareness of need for safety;Decreased insight into deficits       Functional Measure:  [address] [facility] [address] [address] AM-PAC®      Daily Activity Inpatient Short Form (6-Clicks) Version 2  How much HELP from another person do you currently need. .. (If the patient hasn't done an activity recently, how much help from another person do you think they would need if they tried?) Total A Lot A Little None   1. Putting on and taking off regular lower body clothing? []   1 []   2 [x]   3 []   4   2. Bathing (including washing, rinsing, drying)? []   1 []   2 [x]   3 []   4   3. Toileting, which includes using toilet, bedpan, or urinal? []   1 []   2 []   3 [x]   4   4. Putting on and taking off regular upper body clothing? []   1 []   2 []   3 [x]   4   5. Taking care of personal grooming such as brushing teeth? []   1 []   2 []   3 [x]   4   6. Eating meals? []   1 []   2 []   3 [x]   4     Raw Score: 22/24                            Cutoff score ?191,2,3 had higher odds of discharging home with home health or need of SNF/IPR    1. Thu Wilson. Validity of the AM-PAC 6-Clicks Inpatient Daily Activity and Basic Mobility Short Forms. Physical Therapy Mar 2014, [phone] [phone]; DOI: 10.2522/ptj.68912241  2. Mena Cuadra.  Association of AM-PAC \"6-Clicks\" Basic Mobility and Daily Activity Scores With Discharge Destination. Phys Ther. 2021 Apr 4;101(4):apxx098. doi: 10.1093/ptj/sgsk645. PMID: 39746992. V Antonina Noble, Silvia STRAUSS, Davion Olsen, Kaylie K, Kimberlee S. Activity Measure for Post-Acute Care \"6-Clicks\" Basic Mobility Scores Predict Discharge Destination After Acute Care Hospitalization in Select Patient Groups: A Retrospective, Observational Study. Arch Rehabil Res Clin Transl. 2022 Jul 16;4(3):538214. doi: 10.1016/j.arrct. 5305.936035. PMID: 92497587; PMCID: [id number]. 4. Thiago Aj, Prabhjot W, Karlie P. AM-PAC Short Forms Manual 4.0. Revised 2/2020. Occupational Therapy Evaluation Charge Determination   History Examination Decision-Making   LOW Complexity : Brief history review  LOW Complexity : 1-3 performance deficits relating to physical, cognitive , or psychosocial skils that result in activity limitations and / or participation restrictions  LOW Complexity : No comorbidities that affect functional and no verbal or physical assistance needed to complete eval tasks       Based on the above components, the patient evaluation is determined to be of the following complexity level: LOW   Pain Rating:  Pt denies pain during session     Activity Tolerance: WNL    After treatment patient left in no apparent distress:    Supine in bed, Call bell within reach, and Side rails x 3    COMMUNICATION/EDUCATION:   The patients plan of care was discussed with: Physical therapist, Occupational therapist, and Registered nurse.      Thank you for this referral.  Catherine Escamilla, OT  Time Calculation: 31 mins

## 2023-02-13 NOTE — PROGRESS NOTES
NIHSS Code Stroke Documentation      Person Administering Scale: Everett OspinaDENIA    TIME: 8:31 PM    LKW: Last night    PMH: Autoimmune hearing Loss, HTN, Migraine    SUBJECTIVE:   Chief Complaint   Patient presents with    Memory Loss, thought process difficulties, \"Brain is spinning\" denied dizziness   /105   CTH negative  Pending CTA    NIHSS  1a  Level of consciousness: 0=alert; keenly responsive   1b. LOC questions:  0=Answers both questions correctly   1c. LOC commands: 0=Performs both tasks correctly   2. Best Gaze: 0=normal   3. Visual: 0=No visual loss   4. Facial Palsy: 0=Normal symmetric movement   5a. Motor left arm: 0=No drift, arm holds 90 (or 45) degrees for full 10 seconds   5b. Motor right arm: 0=No drift, arm holds 90 (or 45) degrees for full 10 seconds   6a. Motor left le=No drift; leg holds 30-degree position for full 5 seconds. 6b  Motor right le=No drift; leg holds 30-degree position for full 5 seconds. 7. Limb Ataxia: 0=Absent   8. Sensory: 0=Normal; no sensory loss   9. Best Language:  0=No aphasia, normal   10. Dysarthria: 0=Normal   11. Extinction and Inattention: 0=No abnormality    Total:    0     Premorbid mRS: 0    VAN: NEGATIVE    tNK Candidate: NO    Mechanical Thrombectomy Candidate: NO    ANTIPLT/AC/ANTITHROMB: NO    CT Results (most recent):  Results from Hospital Encounter encounter on 23    CT CODE NEURO HEAD WO CONTRAST    Narrative  EXAM: CT CODE NEURO HEAD WO CONTRAST    INDICATION: Code Stroke    COMPARISON: None. CONTRAST: None. TECHNIQUE: Unenhanced CT of the head was performed using 5 mm images. Brain and  bone windows were generated. Coronal and sagittal reformats. CT dose reduction  was achieved through use of a standardized protocol tailored for this  examination and automatic exposure control for dose modulation.     FINDINGS:  No extra-axial fluid collection hemorrhage shift or masses. Impression  Negative. Discussed with: tele-neuro Dr. Suzy Taylor, ED physician Dr. Marv Paul, ED nurse, and patient    Time spent: 35 minutes. Charlene Jackman NP  Neurocritical Care Nurse Practitioner  [phone]    Please note that this dictation was completed with [employer], the computer voice recognition software. Quite often unanticipated grammatical, syntax, homophones, and other interpretive errors are inadvertently transcribed by the computer software. Please excuse any errors that have escaped final proofreading.

## 2023-02-13 NOTE — CONSULTS
[address]    Name:  Hamzah Davis  MR#:  [id number]  :  1955  ACCOUNT #:  [de-identified]  DATE OF SERVICE:  2023      REQUESTING PHYSICIAN:  Nataly Jean MD    REASON FOR EVALUATION:  Altered mental status. HISTORY OF PRESENT ILLNESS:  The patient is a 58-year-old female with no significant known past medical history, who was brought in by her brother and sister-in-law because she was appearing altered. The patient is not able to give me much details, but does say that she was having problems functioning and with her memory yesterday. As per her sister in-law's account, her friends noticed that she has been somewhat restless and agitated over the past couple of days. She was quite frustrated yesterday and called her brother and they came over. When they arrived, they noticed that she appeared very distraught, was restless, pacing around. Would go into a room to get something, but then would come back without getting it. She had sticky notes all over the place about what she needed to do. She was forgetting things and would sometimes repeat herself with that same question. She had also been dealing with a squirrel inside the house and daughter-in-law is unsure if that was indeed the case. She would insist on something and would resist any arguments about it. She did not have any associated headache, fevers, chest pain, shortness of breath, palpitations, nausea, vomiting, or any focal motor or sensory deficits. She was noted to be hypertensive when she arrived with systolic blood pressure in the 190 range. She may have improved some, but she is still not fully back to her baseline. She was admitted for stroke workup and had MRI brain and CTA of the head and neck which was negative. PAST MEDICAL HISTORY:  As mentioned above. ALLERGIES:  NONE. SOCIAL HISTORY:  No history of smoking or alcohol use or substance use. HOME MEDICATIONS:  1. Lisinopril. 2.  Leucovorin. 3.  Methotrexate. 4.  Folic acid. REVIEW OF SYSTEMS:  Negative except as mentioned in the HPI. PHYSICAL EXAMINATION:  GENERAL:  The patient is alert, lying in bed in no acute distress. VITAL SIGNS:  Blood pressure 148/92, temperature 98, pulse is 73. HEART:  Regular rate and rhythm. CHEST:  Clear. ABDOMEN:  Soft, nontender. Positive bowel sounds. EXTREMITIES:  No edema. NEUROLOGIC:  She is able to answer questions and follows simple commands. She knows she is at the hospital, but then says that this place has been set up to look like a hospital.  She knows the name of the month, but insists that this is 02/12 and not the 13th. She was able to tell me her street address, but she does not think that she lives there anymore, but her sister-in-law reports that she does. She keeps saying that she is already dead. Pupils are equal and reactive. Face is symmetric. Tongue is midline. Hearing is baseline. Muscle tone and bulk normal.  No tremors. Strength normal in all extremities. When she raises her arms, she tends to hold them there and states that they will not come down. DTRs brisk with downgoing toes. Sensation is intact. Gait exam is deferred. LABORATORY DATA:  CBC and chemistries unremarkable. CTA of the head and neck is normal.  MRI scan of the brain is normal.    ASSESSMENT AND PLAN:  A 79-year-old female who has been somewhat restless and anxious last couple of days. Yesterday, she called her sister-in-law that she was feeling forgetful and confused. They noticed that she was very distraught, not able to do what she needed to do, was pacing around and had sticky notes about things she needed to do everywhere. She was appearing agitated. Was brought in as a Code Stroke alert and had a stroke workup which was negative.     On exam, she is awake and alert, oriented, insists that it is 02/12 and not 13th, keeps saying that she is dead and this place has been set up to look like a hospital.  She is able to follow commands but will not lower her arms after she raise them. I suspect that this could be a mental health issue. I recommend psychiatric a involvement as well. I will check an EEG and follow clinically. Continue treatment of blood pressure. Thank you for this consultation.       Mercedes Smith MD      [id number]  D:  02/13/2023 15:32  T:  02/13/2023 16:16  JOB #:  [id number]

## 2023-02-13 NOTE — DISCHARGE INSTRUCTIONS
Discharge Instructions       PATIENT ID: Bhanu Mendez  MRN: [id number]   YOB: 1955    DATE OF ADMISSION: 2/12/2023   DATE OF DISCHARGE: 2/17/2023    PRIMARY CARE PROVIDER: None     ATTENDING PHYSICIAN: Danette Stanton MD   DISCHARGING PROVIDER: Jeremiah Velasco MD    To contact this individual call [phone] and ask the  to page. If unavailable ask to be transferred the Adult Hospitalist Department. DISCHARGE DIAGNOSES   Acute AMS  Possible paranoid schizophrenia versus conversion disorder  Menière Disease  HTN  Hx of Ocular Migraines    CONSULTATIONS:   Psychiatrist     PROCEDURES/SURGERIES: * No surgery found *    PENDING TEST RESULTS:   At the time of discharge the following test results are still pending: None    FOLLOW UP APPOINTMENTS:   Psychiatrist     ADDITIONAL CARE RECOMMENDATIONS:     DIET: Regular Diet    ACTIVITY: Activity as tolerated    WOUND CARE: None    EQUIPMENT needed: None      DISCHARGE MEDICATIONS:   See Medication Reconciliation Form    It is important that you take the medication exactly as they are prescribed. Keep your medication in the bottles provided by the pharmacist and keep a list of the medication names, dosages, and times to be taken in your wallet. Do not take other medications without consulting your doctor. NOTIFY YOUR PHYSICIAN FOR ANY OF THE FOLLOWING:   Fever over 101 degrees for 24 hours. Chest pain, shortness of breath, fever, chills, nausea, vomiting, diarrhea, change in mentation, falling, weakness, bleeding. Severe pain or pain not relieved by medications. Or, any other signs or symptoms that you may have questions about.       DISPOSITION:    Home With:   OT  PT  HH  RN       SNF/Inpatient Rehab/LTAC    Independent/assisted living    Hospice   x Transfer to [facility] Checked:   Yes x     PROBLEM LIST Updated:  Yes x       Signed:   Jeremiah Velasco MD  2/17/2023  12:32 PM

## 2023-02-13 NOTE — CONSULTS
Consult dictated. 72-year-old female who has been somewhat restless and anxious last couple of days. Yesterday she called her sister-in-law that she was feeling forgetful and confused. They noticed that she was very distraught, not able to do with she needed to do, pacing around, had sticky notes about things that she needed to do everywhere, appeared agitated. Brought to the ED, had stroke work-up which is negative. On exam, she is awake and alert, oriented, insists that it is February 12th and not 13th, keeps saying that she is dead and this place has been set up to look like a hospital.  Able to follow commands but will not lower her arms after she raised them. Suspect that this could be a mental health issue. Recommend psych evaluation. We will check an EEG and follow clinically.    Gerald Lindo MD

## 2023-02-14 LAB
ALBUMIN SERPL-MCNC: 3.9 G/DL (ref 3.5–5)
ALBUMIN/GLOB SERPL: 1.1 (ref 1.1–2.2)
ALP SERPL-CCNC: 54 U/L (ref 45–117)
ALT SERPL-CCNC: 35 U/L (ref 12–78)
ANION GAP SERPL CALC-SCNC: 5 MMOL/L (ref 5–15)
AST SERPL-CCNC: 26 U/L (ref 15–37)
BASOPHILS # BLD: 0.1 K/UL (ref 0–0.1)
BASOPHILS NFR BLD: 1 % (ref 0–1)
BILIRUB DIRECT SERPL-MCNC: 0.3 MG/DL (ref 0–0.2)
BILIRUB SERPL-MCNC: 2.1 MG/DL (ref 0.2–1)
BUN SERPL-MCNC: 17 MG/DL (ref 6–20)
BUN/CREAT SERPL: 24 (ref 12–20)
CALCIUM SERPL-MCNC: 9.7 MG/DL (ref 8.5–10.1)
CHLORIDE SERPL-SCNC: 109 MMOL/L (ref 97–108)
CHOLEST SERPL-MCNC: 171 MG/DL
CO2 SERPL-SCNC: 26 MMOL/L (ref 21–32)
CREAT SERPL-MCNC: 0.71 MG/DL (ref 0.55–1.02)
DIFFERENTIAL METHOD BLD: ABNORMAL
EOSINOPHIL # BLD: 0.1 K/UL (ref 0–0.4)
EOSINOPHIL NFR BLD: 1 % (ref 0–7)
ERYTHROCYTE [DISTWIDTH] IN BLOOD BY AUTOMATED COUNT: 13.4 % (ref 11.5–14.5)
EST. AVERAGE GLUCOSE BLD GHB EST-MCNC: 108 MG/DL
GLOBULIN SER CALC-MCNC: 3.5 G/DL (ref 2–4)
GLUCOSE SERPL-MCNC: 113 MG/DL (ref 65–100)
HBA1C MFR BLD: 5.4 % (ref 4–5.6)
HCT VFR BLD AUTO: 38.8 % (ref 35–47)
HDLC SERPL-MCNC: 87 MG/DL
HDLC SERPL: 2 (ref 0–5)
HGB BLD-MCNC: 12.8 G/DL (ref 11.5–16)
IMM GRANULOCYTES # BLD AUTO: 0 K/UL (ref 0–0.04)
IMM GRANULOCYTES NFR BLD AUTO: 0 % (ref 0–0.5)
LDLC SERPL CALC-MCNC: 76.2 MG/DL (ref 0–100)
LYMPHOCYTES # BLD: 0.6 K/UL (ref 0.8–3.5)
LYMPHOCYTES NFR BLD: 12 % (ref 12–49)
MAGNESIUM SERPL-MCNC: 2.6 MG/DL (ref 1.6–2.4)
MCH RBC QN AUTO: 32.6 PG (ref 26–34)
MCHC RBC AUTO-ENTMCNC: 33 G/DL (ref 30–36.5)
MCV RBC AUTO: 98.7 FL (ref 80–99)
MONOCYTES # BLD: 0.4 K/UL (ref 0–1)
MONOCYTES NFR BLD: 8 % (ref 5–13)
NEUTS SEG # BLD: 3.7 K/UL (ref 1.8–8)
NEUTS SEG NFR BLD: 78 % (ref 32–75)
NRBC # BLD: 0 K/UL (ref 0–0.01)
NRBC BLD-RTO: 0 PER 100 WBC
PHOSPHATE SERPL-MCNC: 3.4 MG/DL (ref 2.6–4.7)
PLATELET # BLD AUTO: 286 K/UL (ref 150–400)
PMV BLD AUTO: 10.5 FL (ref 8.9–12.9)
POTASSIUM SERPL-SCNC: 3.8 MMOL/L (ref 3.5–5.1)
PROT SERPL-MCNC: 7.4 G/DL (ref 6.4–8.2)
RBC # BLD AUTO: 3.93 M/UL (ref 3.8–5.2)
RBC MORPH BLD: ABNORMAL
SODIUM SERPL-SCNC: 140 MMOL/L (ref 136–145)
TRIGL SERPL-MCNC: 39 MG/DL (ref ?–150)
VLDLC SERPL CALC-MCNC: 7.8 MG/DL
WBC # BLD AUTO: 4.9 K/UL (ref 3.6–11)

## 2023-02-14 PROCEDURE — 85025 COMPLETE CBC W/AUTO DIFF WBC: CPT

## 2023-02-14 PROCEDURE — 36415 COLL VENOUS BLD VENIPUNCTURE: CPT

## 2023-02-14 PROCEDURE — 82248 BILIRUBIN DIRECT: CPT

## 2023-02-14 PROCEDURE — 65270000029 HC RM PRIVATE

## 2023-02-14 PROCEDURE — 84100 ASSAY OF PHOSPHORUS: CPT

## 2023-02-14 PROCEDURE — 83735 ASSAY OF MAGNESIUM: CPT

## 2023-02-14 PROCEDURE — 74011250637 HC RX REV CODE- 250/637: Performed by: NURSE PRACTITIONER

## 2023-02-14 PROCEDURE — 80061 LIPID PANEL: CPT

## 2023-02-14 PROCEDURE — 80053 COMPREHEN METABOLIC PANEL: CPT

## 2023-02-14 PROCEDURE — 83036 HEMOGLOBIN GLYCOSYLATED A1C: CPT

## 2023-02-14 RX ORDER — TRAZODONE HYDROCHLORIDE 100 MG/1
100 TABLET ORAL
Status: DISCONTINUED | OUTPATIENT
Start: 2023-02-14 | End: 2023-02-15

## 2023-02-14 RX ORDER — ONDANSETRON 2 MG/ML
4 INJECTION INTRAMUSCULAR; INTRAVENOUS
Status: DISCONTINUED | OUTPATIENT
Start: 2023-02-14 | End: 2023-02-17 | Stop reason: HOSPADM

## 2023-02-14 RX ADMIN — TRAZODONE HYDROCHLORIDE 100 MG: 100 TABLET ORAL at 20:07

## 2023-02-14 NOTE — PROGRESS NOTES
Problem: Pain  Goal: *Control of Pain  Outcome: Progressing Towards Goal     Problem: Patient Education: Go to Patient Education Activity  Goal: Patient/Family Education  Outcome: Progressing Towards Goal     Problem: Falls - Risk of  Goal: *Absence of Falls  Description: Document Esmer Mahlotra Fall Risk and appropriate interventions in the flowsheet.   Outcome: Progressing Towards Goal  Note: Fall Risk Interventions:                                Problem: Patient Education: Go to Patient Education Activity  Goal: Patient/Family Education  Outcome: Progressing Towards Goal
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
## 2023-02-14 NOTE — PROGRESS NOTES
Hospitalist Progress Note  Yoan Dorado PA-C  Answering service: [phone] OR [phone] from in house phone        Date of Service:  2023  NAME:  Pilar Scott  :  1955  MRN:  [id number]      Admission Summary:   Pilar Scott is a 78 y.o. 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 Neurology and now Psychiatry consulted. Interval history / Subjective:   Patient is feeling alright at this time. She tells me that \"you're not in my alternate reality, you have outlived me and you're not part of this reality. \" She tells me I am real, but that not in her reality. She tells me there is no point in telling me where she is with orientation questions as it will be different tomorrow. Returned to update her sister-in-law. The psychiatry team is in with her. Assessment & Plan:     Acute AMS  -- Appreciate Neurology involvement. Consulted Psychiatry on   -- Notable labs: WBC 6.5, H/H of 11.8/36.4, Plt 308, Na 138, SCr 0.84, ALT/AST 43/33, T Bili 1.3, Alk Phos 51, INR 1.0, UA negative, TSH 1.75, folate 31.4, Vitamin B12 317, UDS negative  -- Notable imaging: CT Head, CTA H/N, MRI Brain all with no acute intracranial process  -- EEG ordered per neurology note  -- Patient can transfer to medical bed and off telemetry.  Does not need TTE    Menière Disease  -- Regimen includes weekly MTX followed by 2 days of Leucovorin  -- No evidence of MTX toxicity in lab work-  -- Hold for now    HTN  -- Continue home Lisinopril 30mg daily     Hx of Ocular Migraines  -- No current exacerbation    Called and spoke with Rheumatologist, Dr. Marilee Garnica, office on  and reviewed medication list and no recent changes, confirmed the weekly MTX and Leucovorin and folic acid. Can hold additional daily labs for now as labs stable and she is clinically stable. Can order with clinical change or if additional work-up    Code status: Full Code  Prophylaxis: LVX  Care Plan discussed with: Pt, RN, Dr. Nam Tomlin  Anticipated Disposition: TBD     Hospital Problems  Never Reviewed            Codes Class Noted POA    Altered mental status ICD-10-CM: R41.82  ICD-9-CM: 780.97  2/13/2023 Unknown         Review of Systems:   A comprehensive review of systems was negative except for that written in the HPI. Vital Signs:    Last 24hrs VS reviewed since prior progress note. Most recent are:  Visit Vitals  BP (!) 150/84 (BP 1 Location: Right upper arm, BP Patient Position: Lying)   Pulse 80   Temp 97.8 °F (36.6 °C)   Resp 16   Ht 5' 8\" (1.727 m)   Wt 53.1 kg (117 lb)   SpO2 98%   Breastfeeding No   BMI 17.79 kg/m²       No intake or output data in the 24 hours ending 02/14/23 1343     Physical Examination:     I had a face to face encounter with this patient and independently examined them on 2/14/2023 as outlined below:          General : Awake, alert. Orientation questions says her name, at [facility], but then refuses to answer location as stating \"it will be different tomorrow. \"   HEENT: PEERL, EOMI, moist mucus membrane  Neck: supple  Chest: Clear to auscultation bilaterally   CVS: RRR, no murmurs appreciated   Abd: soft/ non tender, non distended  Ext: No edema  Neuro/Psych: CN 2-12 grossly intact. Sensation intact to light touch with no extinction.  Moves all extremities spontaneously antigravity   Skin: warm and well perfused           Data Review:    Review and/or order of clinical lab test  Review and/or order of tests in the radiology section of CPT  Review and/or order of tests in the medicine section of CPT    I have independently reviewed and interpreted patient's lab and all other diagnostic data    Notes reviewed from all clinical/nonclinical/nursing services involved in patient's clinical care. Care coordination discussions were held with appropriate clinical/nonclinical/ nursing providers based on care coordination needs. Labs:     Recent Labs     02/14/23 0451 02/12/23 2017   WBC 4.9 6.5   HGB 12.8 11.8   HCT 38.8 36.4    308       Recent Labs     02/14/23 0451 02/12/23 2017    138   K 3.8 3.9   * 106   CO2 26 27   BUN 17 21*   CREA 0.71 0.84   * 122*   CA 9.7 9.5   MG 2.6*  --    PHOS 3.4  --        Recent Labs     02/14/23 0451 02/12/23 2017   ALT 35 43   AP 54 51   TBILI 2.1* 1.3*   TP 7.4 7.7   ALB 3.9 4.1   GLOB 3.5 3.6       Recent Labs     02/12/23 2017   INR 1.0   PTP 10.7        No results for input(s): FE, TIBC, PSAT, FERR in the last 72 hours. Lab Results   Component Value Date/Time    Folate 31.4 (H) 02/13/2023 01:10 PM        No results for input(s): PH, PCO2, PO2 in the last 72 hours. No results for input(s): CPK, CKNDX, TROIQ in the last 72 hours.     No lab exists for component: CPKMB  Lab Results   Component Value Date/Time    Cholesterol, total 171 02/14/2023 04:51 AM    HDL Cholesterol 87 02/14/2023 04:51 AM    LDL, calculated 76.2 02/14/2023 04:51 AM    Triglyceride 39 02/14/2023 04:51 AM    CHOL/HDL Ratio 2.0 02/14/2023 04:51 AM     Lab Results   Component Value Date/Time    Glucose (POC) 122 (H) 02/12/2023 08:17 PM    Glucose (POC) 130 (H) 02/12/2023 08:00 PM     Lab Results   Component Value Date/Time    Color YELLOW/STRAW 02/12/2023 09:54 PM    Appearance CLEAR 02/12/2023 09:54 PM    pH (UA) 7.0 02/12/2023 09:54 PM    Protein Negative 02/12/2023 09:54 PM    Glucose Negative 02/12/2023 09:54 PM    Ketone Negative 02/12/2023 09:54 PM    Bilirubin Negative 02/12/2023 09:54 PM    Urobilinogen 0.2 02/12/2023 09:54 PM    Nitrites Negative 02/12/2023 09:54 PM    Leukocyte Esterase Negative 02/12/2023 09:54 PM    Epithelial cells FEW 02/12/2023 09:54 PM    Bacteria Negative 02/12/2023 09:54 PM    WBC 0-4 02/12/2023 09:54 PM    RBC 0-5 02/12/2023 09:54 PM         Medications Reviewed:     Current Facility-Administered Medications   Medication Dose Route Frequency    ondansetron (ZOFRAN) injection 4 mg  4 mg IntraVENous Q8H PRN    acetaminophen (TYLENOL) tablet 650 mg  650 mg Oral Q4H PRN    Or    acetaminophen (TYLENOL) solution 650 mg  650 mg Per NG tube Q4H PRN    Or    acetaminophen (TYLENOL) suppository 650 mg  650 mg Rectal Q4H PRN    lisinopriL (PRINIVIL, ZESTRIL) tablet 30 mg  30 mg Oral DAILY    folic acid (FOLVITE) tablet 3 mg  3 mg Oral DAILY    enoxaparin (LOVENOX) injection 40 mg  40 mg SubCUTAneous Q24H     ______________________________________________________________________  EXPECTED LENGTH OF STAY: 2d 12h  ACTUAL LENGTH OF STAY:          1                 Yoan Dorado PA-C

## 2023-02-14 NOTE — PROGRESS NOTES
BRAD- To be determined. Reason for Admission:  altered mental status                     RUR Score:   8%                  Plan for utilizing home health:     TBD     PCP: First and Last name:  None     Name of Practice:    Are you a current patient: Yes/No:    Approximate date of last visit:    Can you participate in a virtual visit with your PCP:                     Current Advanced Directive/Advance Care Plan: Full Code      Healthcare Decision Maker:   Click here to complete [address] including selection of the Healthcare Decision Maker Relationship (ie \"Primary\")                             Transition of Care Plan:   CM met with pt's sister in [address] ([phone]) to introduce her to the role of CM and transition of care. This pt lives alone in a 3 story home with a basement. Per, Ms. Perla, this pt was independent with all of her ADL's and IADL's prior to admission. She was a  as well . She does not have any DME, as she never needed it before. CM will follow for d/c needs. [address] Management Interventions  PCP Verified by CM:  Yes  Palliative Care Criteria Met (RRAT>21 & CHF Dx)?: No  Transition of Care Consult (CM Consult): Discharge Planning  Discharge Durable Medical Equipment: No  Physical Therapy Consult: No  Occupational Therapy Consult: No  Speech Therapy Consult: No  Support Systems: Other Family Member(s)  Confirm Follow Up Transport: Family  The Plan for Transition of Care is Related to the Following Treatment Goals : safe d/c  The Patient and/or Patient Representative was Provided with a Choice of Provider and Agrees with the Discharge Plan?: Yes  Freedom of Choice List was Provided with Basic Dialogue that Supports the Patient's Individualized Plan of Care/Goals, Treatment Preferences and Shares the Quality Data Associated with the Providers?: No   Resource Information Provided?: No  Discharge Location  Patient Expects to be Discharged to[de-identified] Home
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
## 2023-02-14 NOTE — BSMART NOTE
BSMART Liaison Team Note     LOS:  1     Patient goal(s) for today: communicate needs to staff  ACUITY SPECIALTY [facility] Liaison team focus/goals: assess MH needs, provide education and support    Progress note: Pt was poor historian and unable to provide full history. Pt came to ED 2/13/23 c/o memory issues. Pt was admitted medically for acute memory impariment. IP Psychiatry consulted d/t \"acute AMS, dissociative state? \" Pt seen face to face on medical unit with sister-in-law, Monisha Montesinos, at bedside. Pt was alert, oriented to place, month and year. She was anxious about delusions of an \"alternate reality\" where she and several of her close friends and family were dead. She demonstrated flight of ideas. She frequently would answer questions staying \"that's on my list\" and seem to believe anything she wrote down would come true, and that she could predict who would die. She also cried that she killer her nephew after he killed her. The was experiencing tactile hallucinations that she was having incontinent episode in the bed and required frequent reassurance she had not. She often asked this Liaison \"would you write that down please? \" Pt has been having poor sleep per     Barriers to Discharge: medical  Guns in the home: no     Outpatient provider(s):  unknown  Insurance info/prescription coverage:  [place]    Diagnosis:  altered mental status. Plan:  Refer to psychiatric NP consult note. Follow up Psych Consult placed? yes. Psychiatrist updated?  yes - discussed case with Mario Vance NP      Participating treatment team members: Carly Benoit, PARK Cristina

## 2023-02-14 NOTE — PROGRESS NOTES
Bedside and Verbal shift change report given to Munir Higuera RN (oncoming nurse) by Lesly Hernández RN (offgoing nurse). Report included the following information SBAR, Kardex, ED Summary, Intake/Output, MAR, Recent Results, and Cardiac Rhythm NSR .

## 2023-02-15 ENCOUNTER — APPOINTMENT (OUTPATIENT)
Dept: CT IMAGING | Age: 68
DRG: 885 | End: 2023-02-15
Attending: HOSPITALIST
Payer: MEDICARE

## 2023-02-15 PROCEDURE — 74011250637 HC RX REV CODE- 250/637: Performed by: PHYSICIAN ASSISTANT

## 2023-02-15 PROCEDURE — 74011250636 HC RX REV CODE- 250/636: Performed by: PSYCHIATRY & NEUROLOGY

## 2023-02-15 PROCEDURE — 74011250636 HC RX REV CODE- 250/636: Performed by: PHYSICIAN ASSISTANT

## 2023-02-15 PROCEDURE — 65270000029 HC RM PRIVATE

## 2023-02-15 PROCEDURE — 70450 CT HEAD/BRAIN W/O DYE: CPT

## 2023-02-15 PROCEDURE — 74011250637 HC RX REV CODE- 250/637: Performed by: PSYCHIATRY & NEUROLOGY

## 2023-02-15 RX ORDER — HALOPERIDOL 5 MG/ML
5 INJECTION INTRAMUSCULAR ONCE
Status: COMPLETED | OUTPATIENT
Start: 2023-02-15 | End: 2023-02-15

## 2023-02-15 RX ORDER — DIVALPROEX SODIUM 250 MG/1
250 TABLET, DELAYED RELEASE ORAL 2 TIMES DAILY
Status: DISCONTINUED | OUTPATIENT
Start: 2023-02-15 | End: 2023-02-17 | Stop reason: HOSPADM

## 2023-02-15 RX ORDER — DIPHENHYDRAMINE HYDROCHLORIDE 50 MG/ML
50 INJECTION, SOLUTION INTRAMUSCULAR; INTRAVENOUS ONCE
Status: COMPLETED | OUTPATIENT
Start: 2023-02-15 | End: 2023-02-15

## 2023-02-15 RX ORDER — OLANZAPINE 5 MG/1
5 TABLET ORAL
Status: DISCONTINUED | OUTPATIENT
Start: 2023-02-15 | End: 2023-02-16

## 2023-02-15 RX ORDER — LORAZEPAM 2 MG/ML
1 INJECTION, SOLUTION INTRAMUSCULAR; INTRAVENOUS ONCE
Status: COMPLETED | OUTPATIENT
Start: 2023-02-15 | End: 2023-02-15

## 2023-02-15 RX ORDER — OLANZAPINE 2.5 MG/1
2.5 TABLET ORAL
Status: DISCONTINUED | OUTPATIENT
Start: 2023-02-15 | End: 2023-02-17 | Stop reason: HOSPADM

## 2023-02-15 RX ADMIN — LORAZEPAM 1 MG: 2 INJECTION, SOLUTION INTRAMUSCULAR; INTRAVENOUS at 14:53

## 2023-02-15 RX ADMIN — LISINOPRIL 30 MG: 20 TABLET ORAL at 09:18

## 2023-02-15 RX ADMIN — FOLIC ACID 3 MG: 1 TABLET ORAL at 09:18

## 2023-02-15 RX ADMIN — DIPHENHYDRAMINE HYDROCHLORIDE 50 MG: 50 INJECTION, SOLUTION INTRAMUSCULAR; INTRAVENOUS at 14:51

## 2023-02-15 RX ADMIN — HALOPERIDOL LACTATE 5 MG: 5 INJECTION, SOLUTION INTRAMUSCULAR at 14:51

## 2023-02-15 RX ADMIN — ENOXAPARIN SODIUM 40 MG: 100 INJECTION SUBCUTANEOUS at 09:18

## 2023-02-15 NOTE — PROGRESS NOTES
Problem: Falls - Risk of  Goal: *Absence of Falls  Description: Document Natalia Palacios Fall Risk and appropriate interventions in the flowsheet.   Outcome: Progressing Towards Goal  Note: Fall Risk Interventions:

## 2023-02-15 NOTE — PROGRESS NOTES
Post Fall Documentation    Malu Benoit witnessed/unwitnessed fall occurred on 2/15 (Date) at 0 (Time). The answers to the following questions summarize the fall: In the patient's own words: **PATIENT UNABLE TO VERBALIZE DUE TO AMS**  What were you attempting to do when you fell? Do you know why you fell? Do you have any pain/discomfort or any other complaints? PER BROTHER, PATIENT IS COMPLAINING OF OCCULAR MIGRAINE (patient has a history of this)  Which part of your body made contact with the floor or other object? Head   Nurse:  Was this an assisted fall? yes  Was fall witnessed? Yes     By Whom? Brother   If witnessed, what part of the body made contact with the floor or other object? Head per family  Patients mental status after the fall/when found: Confused  Any apparent injury:  Laceration(s) and Other on R. Arm and L. Knee  Immediate interventions for injury/suspected injury? Other Awaiting head CT  Patient assisted back to bed? Assist X1 PT assisted patient back to bed  Name of provider notified and time, any comments? Dr. Adin Moreno       Name of family member notified and time: Family at bedside     Document Immediate VS and physical assessment in flowsheets. Document Neuro assessment every hour x 4 (for potential head injury or unwitnessed fall) in flowsheets.         Tish Chun RN

## 2023-02-15 NOTE — PROGRESS NOTES
Hospitalist Progress Note  Olya Whelan MD  Answering service: [phone] OR [phone] from in house phone        Date of Service:  2/15/2023  NAME:  Leander Basilio  :  1955  MRN:  [id number]      Admission Summary:   Leander Basilio is a 78 y.o. 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 Neurology and now Psychiatry consulted. Interval history / Subjective:     Patient seen along with family member at bedside who is patient's long-term friend she is confused and appropriate in conversation and is relevant and possibly hearing voices because she was talking with picture on the phone  She also got paranoid with the EEG tech thinking trying to kill her a psych consult was placed and I think patient has paranoid schizophrenia and need to be admitted to inpatient psych     Assessment & Plan:     Acute [facility] Neurology involvement. Consulted Psychiatry on   --Consult today and sent to psych attending to give his thought patient probably has paranoid schizophrenia or conversion disorder unsure what triggered it  -- Notable imaging: CT Head, CTA H/N, MRI Brain all with no acute intracranial process  -- EEG --patient refusing  -- Patient can transfer to medical bed and off telemetry. Menière Disease  -- Regimen includes weekly MTX followed by 2 days of Leucovorin  -- No evidence of MTX toxicity in lab work-  -- Rheumatologist--Dr. Nedra Lozada, office on  and reviewed medication list and no recent changes, confirmed the weekly MTX and Leucovorin and folic acid.      HTN  -- Continue home Lisinopril 30mg daily     Hx of Ocular Migraines  -- No current exacerbation    Code status: Full Code  Prophylaxis: LVX  Care Plan discussed with: Pt, RN   Anticipated Disposition: TBD     Hospital Problems  Never Reviewed            Codes Class Noted POA    Altered mental status ICD-10-CM: R41.82  ICD-9-CM: 780.97  2/13/2023 Unknown       Review of Systems:   A comprehensive review of systems was negative except for that written in the HPI. Vital Signs:    Last 24hrs VS reviewed since prior progress note. Most recent are:  Visit Vitals  BP (!) 145/91 (BP 1 Location: Right upper arm, BP Patient Position: At rest)   Pulse (!) 116   Temp 97.9 °F (36.6 °C)   Resp 18   Ht 5' 8\" (1.727 m)   Wt 53.1 kg (117 lb)   SpO2 100%   Breastfeeding No   BMI 17.79 kg/m²       No intake or output data in the 24 hours ending 02/15/23 1329     Physical Examination:     I had a face to face encounter with this patient and independently examined them on 2/15/2023 as outlined below:          General : Awake, alert. Orientation questions says her name, at [facility], but then refuses to answer location as stating \"it will be different tomorrow. \"   HEENT: PEERL, EOMI, moist mucus membrane  Neck: supple  Chest: Clear to auscultation bilaterally   CVS: RRR, no murmurs appreciated   Abd: soft/ non tender, non distended  Ext: No edema  Neuro/Psych: CN 2-12 grossly intact. Sensation intact to light touch with no extinction. Moves all extremities spontaneously antigravity   Skin: warm and well perfused           Data Review:    Review and/or order of clinical lab test  Review and/or order of tests in the radiology section of CPT  Review and/or order of tests in the medicine section of CPT    I have independently reviewed and interpreted patient's lab and all other diagnostic data    Notes reviewed from all clinical/nonclinical/nursing services involved in patient's clinical care. Care coordination discussions were held with appropriate clinical/nonclinical/ nursing providers based on care coordination needs.      Labs:     Recent Labs 02/14/23 0451 02/12/23 2017   WBC 4.9 6.5   HGB 12.8 11.8   HCT 38.8 36.4    308       Recent Labs     02/14/23 0451 02/12/23 2017    138   K 3.8 3.9   * 106   CO2 26 27   BUN 17 21*   CREA 0.71 0.84   * 122*   CA 9.7 9.5   MG 2.6*  --    PHOS 3.4  --        Recent Labs     02/14/23 0451 02/12/23 2017   ALT 35 43   AP 54 51   TBILI 2.1* 1.3*   TP 7.4 7.7   ALB 3.9 4.1   GLOB 3.5 3.6       Recent Labs     02/12/23 2017   INR 1.0   PTP 10.7        No results for input(s): FE, TIBC, PSAT, FERR in the last 72 hours. Lab Results   Component Value Date/Time    Folate 31.4 (H) 02/13/2023 01:10 PM        No results for input(s): PH, PCO2, PO2 in the last 72 hours. No results for input(s): CPK, CKNDX, TROIQ in the last 72 hours.     No lab exists for component: CPKMB  Lab Results   Component Value Date/Time    Cholesterol, total 171 02/14/2023 04:51 AM    HDL Cholesterol 87 02/14/2023 04:51 AM    LDL, calculated 76.2 02/14/2023 04:51 AM    Triglyceride 39 02/14/2023 04:51 AM    CHOL/HDL Ratio 2.0 02/14/2023 04:51 AM     Lab Results   Component Value Date/Time    Glucose (POC) 122 (H) 02/12/2023 08:17 PM    Glucose (POC) 130 (H) 02/12/2023 08:00 PM     Lab Results   Component Value Date/Time    Color YELLOW/STRAW 02/12/2023 09:54 PM    Appearance CLEAR 02/12/2023 09:54 PM    pH (UA) 7.0 02/12/2023 09:54 PM    Protein Negative 02/12/2023 09:54 PM    Glucose Negative 02/12/2023 09:54 PM    Ketone Negative 02/12/2023 09:54 PM    Bilirubin Negative 02/12/2023 09:54 PM    Urobilinogen 0.2 02/12/2023 09:54 PM    Nitrites Negative 02/12/2023 09:54 PM    Leukocyte Esterase Negative 02/12/2023 09:54 PM    Epithelial cells FEW 02/12/2023 09:54 PM    Bacteria Negative 02/12/2023 09:54 PM    WBC 0-4 02/12/2023 09:54 PM    RBC 0-5 02/12/2023 09:54 PM         Medications Reviewed:     Current Facility-Administered Medications   Medication Dose Route Frequency    ondansetron (ZOFRAN) injection 4 mg  4 mg IntraVENous Q8H PRN    traZODone (DESYREL) tablet 100 mg  100 mg Oral QHS    acetaminophen (TYLENOL) tablet 650 mg  650 mg Oral Q4H PRN    Or    acetaminophen (TYLENOL) solution 650 mg  650 mg Per NG tube Q4H PRN    Or    acetaminophen (TYLENOL) suppository 650 mg  650 mg Rectal Q4H PRN    lisinopriL (PRINIVIL, ZESTRIL) tablet 30 mg  30 mg Oral DAILY    folic acid (FOLVITE) tablet 3 mg  3 mg Oral DAILY    enoxaparin (LOVENOX) injection 40 mg  40 mg SubCUTAneous Q24H     ______________________________________________________________________  EXPECTED LENGTH OF STAY: 2d 12h  ACTUAL LENGTH OF STAY:          2                 Olya Whelan MD

## 2023-02-15 NOTE — PROGRESS NOTES
EEG Tech  attempted EEG. The patient was agitated even with family comforting patient. The patient could not tolerate hook up and became more agitated. Several nurses attempted to help but the patient remained agitated.   Will reapproach

## 2023-02-15 NOTE — CONSULTS
PSYCHIATRY EVALUATION NOTE    CHIEF COMPLAINT:  Jocy Muñoz you going to save me? \"    HISTORY OF PRESENTING COMPLAINT:  Malu Benoit is a 78 y.o. WHITE/NON- female who is currently admitted to the medical floor [facility].     She has past medical history of HTN, Meneire Disease, Ocular Migraines, Raynaud's Syndrome, and remote history of melanoma on L thigh (> 30 years ago), who presented to [facility] ED on 02/12/2023 accompanied by her sister in law with complaints of short term impairment. Collateral obtained from patient's brother and family members put emphasis on patient being stranded in the rain this past Sunday (possibly experiencing hypothermia) being the cause of her current presentation. Apparently she was locked out of her place in the rain after having forgotten her keys inside her home. Her brother remarks that patient's sleep in the past week has been quite poor, and specifically in the past two days he suspects that she hasn't slept any more than 1 hour. He's been here with her in the hospital.    The family remarks that patient is a very professional and productive person, especially prior to her penitentiary, and the way she presents now is quite unlike herself. They emphasize that she is quite disinhibited, making highly emotionally charged statements and being 'quite irritable and argumentative.'    Upon my evaluation, patient is awake, alert, but has difficulty following commands, as she appears quite distractible. She is able to name 3 objects that I point to, but doesn't answer my questions to assess repetition, registration, spelling, or 3-min recall. She is verbose and expresses that she has the sensation that so many ideas 'are flying' through her head. Patient had the delusion that this is an alternate reality and that she is dead. Initially she appeared euphoric, but quickly becoming irritable, and eventually crying.  Patient wasn't able to provide answers to my questions regarding depression, suicide, or homicide. She denied experiencing hallucinations of any type. She wasn't responding well to verbal re-direction. She wasn't able to provide past psychiatric diagnoses, history, or treatment. Collateral from the family reveal no past history of psychiatric admissions, treatment, or family history of suicide, bipolar disorder, schizophrenia, or substance use. PAST PSYCHIATRIC HISTORY   No past inpatient psychiatric admissions  No suicide attempts    SUBSTANCE USE HISTORY:  Family collateral provides no hx of substance use. 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  Ocular migraines ,   Raynaud's Syndrome (minimally symptomatic)   hx melanoma (> 30 years ago), (some reports mention L thigh, others mention r arm. This is unclear.)  History of chronic GI symptoms, related to lactose and fructose intolerance    Patient's PCP is Dr. Seun Romero, with whom she follows at [facility], last appointment on 8/19/2022. Patient has been seeing ENT regularly for workup and treatment of Meneier's Disease, last appointment on 11/28/2022  Patient's family share that patient had [place] before end of 2022 and recovered well. Patient's medication record shows rx of ativan in 8/2022 but no other fills. Prior to Admission medications    Medication Sig Start Date End Date Taking? Authorizing Provider   lisinopriL (PRINIVIL, ZESTRIL) 30 mg tablet Take 30 mg by mouth daily. Yes Other, MD Ernst   leucovorin calcium (WELLCOVORIN) 5 mg tablet Take  by mouth two (2) times a week. Yes Shira, MD Ernst   methotrexate (RHEUMATREX) 2.5 mg tablet Take  by mouth two (2) days a week. Yes Shira, MD Ernst   folic acid (FOLVITE) 1 mg tablet Take 3 mg by mouth daily.    Yes Shira, MD Ernst     Vitals:    02/14/23 1938 02/15/23 0342 02/15/23 0838 02/15/23 1400   BP: (!) 151/82 (!) 151/79 (!) 145/91 117/81   Pulse: 70 73 (!) 116 92   Resp: 16 16 18    Temp: 99 °F (37.2 °C) 98.1 °F (36.7 °C) 97.9 °F (36.6 °C)    SpO2: 98% 100%     Weight:       Height:         Lab Results   Component Value Date/Time    WBC 4.9 02/14/2023 04:51 AM    HGB 12.8 02/14/2023 04:51 AM    HCT 38.8 02/14/2023 04:51 AM    PLATELET 907 37/63/9563 04:51 AM    MCV 98.7 02/14/2023 04:51 AM     Lab Results   Component Value Date/Time    Sodium 140 02/14/2023 04:51 AM    Potassium 3.8 02/14/2023 04:51 AM    Chloride 109 (H) 02/14/2023 04:51 AM    CO2 26 02/14/2023 04:51 AM    Anion gap 5 02/14/2023 04:51 AM    Glucose 113 (H) 02/14/2023 04:51 AM    BUN 17 02/14/2023 04:51 AM    Creatinine 0.71 02/14/2023 04:51 AM    BUN/Creatinine ratio 24 (H) 02/14/2023 04:51 AM    Calcium 9.7 02/14/2023 04:51 AM    Bilirubin, total 2.1 (H) 02/14/2023 04:51 AM    Alk. phosphatase 54 02/14/2023 04:51 AM    Protein, total 7.4 02/14/2023 04:51 AM    Albumin 3.9 02/14/2023 04:51 AM    Globulin 3.5 02/14/2023 04:51 AM    A-G Ratio 1.1 02/14/2023 04:51 AM    ALT (SGPT) 35 02/14/2023 04:51 AM    AST (SGOT) 26 02/14/2023 04:51 AM     No results found for: VALF2, VALAC, VALP, VALPR, DS6, CRBAM, CRBAMP, CARB2, XCRBAM  No results found for: LITHM  RADIOLOGY REPORTS:(reviewed/updated 2/15/2023)  MRI BRAIN WO CONT    Result Date: 2/13/2023  CLINICAL HISTORY: acute confusional state. INDICATION: acute confusional state. COMPARISON: 2/12/2023 TECHNIQUE: MR examination of the brain includes axial and sagittal T1, coronal T2, axial T2, axial FLAIR, axial gradient echo, axial DWI. CONTRAST: None FINDINGS: There is no intracranial mass, hemorrhage or evidence of acute infarction. Periventricular and scattered foci of increased T2 signal intensity in cerebral white matter likely of limited clinical significance. Sulcal prominence is age-appropriate. Left vertebral artery is dominant. The brain architecture is normal. There is no evidence of midline shift or mass-effect. There are no extra-axial fluid collections.  There is no Chiari or syrinx. The pituitary and infundibulum are grossly unremarkable. There is no skull base mass. Cerebellopontine angles are grossly unremarkable. The major intracranial vascular flow-voids are unremarkable. The cavernous sinuses are symmetric. Optic chiasm and infundibulum grossly unremarkable. Orbits are grossly symmetric. Dural venous sinuses are grossly patent. The mastoid air cells are well pneumatized and clear. Normal MRI of the brain for patient age. There is no intracranial mass, hemorrhage or evidence of acute infarction. No acute intracranial process is demonstrated. CT CODE NEURO HEAD WO CONTRAST    Result Date: 2/12/2023  EXAM: CT CODE NEURO HEAD WO CONTRAST INDICATION: Code Stroke COMPARISON: None. CONTRAST: None. TECHNIQUE: Unenhanced CT of the head was performed using 5 mm images. Brain and bone windows were generated. Coronal and sagittal reformats. CT dose reduction was achieved through use of a standardized protocol tailored for this examination and automatic exposure control for dose modulation. FINDINGS: No extra-axial fluid collection hemorrhage shift or masses. Negative. CTA HEAD NECK W CONT    Result Date: 2/12/2023  EXAM: CTA HEAD NECK W CONT INDICATION: new onset memory deficit COMPARISON: None. CONTRAST: 100 mL of Isovue-370. TECHNIQUE:  Unenhanced  images were obtained to localize the volume for acquisition. Multislice helical axial CT angiography was performed from the aortic arch to the top of the head during uneventful rapid bolus intravenous contrast administration. Coronal and sagittal reformations and 3D post processing was performed. CT dose reduction was achieved through use of a standardized protocol tailored for this examination and automatic exposure control for dose modulation. This study was analyzed by the [address] ai algorithm. FINDINGS: Head CT obtained after intravenous contrast show no enhancing lesion or masses.  NASCET method was utilized for calculating stenosis. Origins of the vessels from the arch show no significant abnormalities. Carotid bifurcations appear unremarkable. Vertebral arteries in the neck are patent. Intracranially there is no stenosis large vessel occlusion or vascular malformation. Negative. No results found for: [address], [id number], [id number], [id number], PREGU, POCHCG, MHCGN, HCGQR, THCGA1, SHCG, HCGN, HCGSERUM, HCGURQLPOC    FAMILY HISTORY:  No past suicide attempts/completions  No Bipolar disorder or Schizophrenia diagnoses in the family  No Substance use in the family    PSYCHOSOCIAL HISTORY:  Patient is retired. MENTAL STATUS EXAM:  89LA WF who is thin, dressed in hospital gown is labile. She is in poor grooming. She is awake, alert and oriented to self, place, date, but not situation. She is poorly engaged in my evaluation. Her speech is verbose. Thought process is marked for flight of ideas and tangentiality. Mood is reported as \"great\"  Affect is not congruent, and is irritable and labile  Denies Suicidal thoughts, intents or plans. Denies Homicidal thoughts, intents or plans. Denies access to fire-arms  Denies experiencing hallucinations of any type. Perception is positive for significant delusional content of alternate reality, believing that she is dead. Attention/Concentration are both fair at best  Recent/Remote memories are intact per answers to my evaluation questions. Insight is poor. Judgment is poor  Cognition: Intact grossly. ASSESSMENT:  Tamara Casanova is a 74yo WF w/no prior psychiatric history and PMH of Ocular Migraines, Raynaud's Syndrome, and remote history of melanoma on L thigh (> 30 years ago), who presented to [facility] ED on 02/12/2023 accompanied by her sister in law with complaints of short term impairment. Her stroke workup has been pan negative. Her laboratory investigations are non-remarkable. Neurology consultation on 2/13 recommended psychiatric consultation and EEG. Patient's presentation hasn't allowed for administering EEG. Having ruled out medical etiologies for delirium, and upon obtaining collateral information, review of record, labs, evaluation of the patient, her presentation is consistent with a manic episode. Recommend management per plan outlined below for treatment, but patient will require inpatient psychiatric admission. DIAGNOSIS:  BIPOLAR DISORDER, MOST RECENT EPISODE MANIC WITH PSYCHOTIC FEATURES    PLAN: Communicated to brother Toula Mood), and attending physician  1:1 Sitter for verbal re-direction & safety. Gave one time medications to manage agitation: Haldol 5mg IM, Benadry 50mg IM, and ativan 1mg IV. Start depakote [id number] po bid and zyprexa 5mg po qhs. Recommend inpatient psychiatric admission to mitigate the risk of further decompensation once patient is medically cleared. At this time patient is unable to give consent for admission, recommend primary team contact Yash to evaluate patient for TDO (involuntary admission)  For management of agitation,   Recommend offering patient zyprexa 2.5mg po q4h prn agitation. If patient refuses, or patient becomes a danger to herself or others, recommend zyprexa 5mg IM q4h. Please don't hesitate to call linnea Kidd for further assistance in acute management of behavioral health events. Time spent evaluating patient (exam, records review, labs), management of patient's acute presentation, obtaining collateral from family, coordination care w/nursing, attending, and social work 60 minutes. Jhon Henderson M.D.   Psychiatry

## 2023-02-15 NOTE — BSMART NOTE
BSMART Liaison Team Note     LOS:  2     Patient goal(s) for today: communicate needs to staff  [facility] Liaison team focus/goals: assess MH needs, provide education and support    Progress note: Pt seen face to face on medical unit. She was alert, oriented to self, but demonstrating confusions and delusions about time, location and situation. She was tangential, with flight of ideas, labile, she would whisper and yell, she was tearful at times. She spoke about apocalyptic and saviors themes. She names important people. She makes demands of her brother and staff. She appears to be in a manic state, and is able to be redirected by staff. Dr. Lidia Mccallum ordered lorazepam and haldol and will recommend transfer to [facility] once medically stable. RN's administered PRN's and Pt is now resting comfortably. If Pt is not in agreement, or there are concerns about her ability to maintain consent [address] [place], [place] [address]y will need to be contacted for a TDO prescreen assessment. This is not necessary at this time. Liaison will provide education and support to family as appropriate to support Pt's care. Barriers to Discharge: medical & psychiatric  Guns in the home: no      Outpatient provider(s):  bipolar  Insurance info/prescription coverage:  [employer] [address] MEDIC[employer] PPO     Diagnosis:  altered mental status. Plan:  Recommended for [facility] admission once medically cleared. Refer to psychiatric consult note. If Pt does not agree with transfer call [facility] ([phone]) to come complete a TDO Prescreen assessment. Follow up Psych Consult placed? yes. Psychiatrist updated?  yes - discussed case with Dr Nilo Mohr MD      Participating treatment team members: PARK Spear; Nilo Mohr MD

## 2023-02-16 LAB
SARS-COV-2 RDRP RESP QL NAA+PROBE: NOT DETECTED
SOURCE, COVRS: NORMAL

## 2023-02-16 PROCEDURE — 87635 SARS-COV-2 COVID-19 AMP PRB: CPT

## 2023-02-16 PROCEDURE — 74011250637 HC RX REV CODE- 250/637: Performed by: PSYCHIATRY & NEUROLOGY

## 2023-02-16 PROCEDURE — 74011250637 HC RX REV CODE- 250/637: Performed by: PHYSICIAN ASSISTANT

## 2023-02-16 PROCEDURE — 65270000029 HC RM PRIVATE

## 2023-02-16 PROCEDURE — 74011250636 HC RX REV CODE- 250/636: Performed by: PHYSICIAN ASSISTANT

## 2023-02-16 RX ORDER — OLANZAPINE 2.5 MG/1
2.5 TABLET ORAL DAILY
Status: DISCONTINUED | OUTPATIENT
Start: 2023-02-17 | End: 2023-02-17 | Stop reason: HOSPADM

## 2023-02-16 RX ADMIN — FOLIC ACID 3 MG: 1 TABLET ORAL at 08:51

## 2023-02-16 RX ADMIN — DIVALPROEX SODIUM 250 MG: 250 TABLET, DELAYED RELEASE ORAL at 17:24

## 2023-02-16 RX ADMIN — DIVALPROEX SODIUM 250 MG: 250 TABLET, DELAYED RELEASE ORAL at 08:51

## 2023-02-16 RX ADMIN — OLANZAPINE 7.5 MG: 5 TABLET, FILM COATED ORAL at 20:40

## 2023-02-16 RX ADMIN — ENOXAPARIN SODIUM 40 MG: 100 INJECTION SUBCUTANEOUS at 08:51

## 2023-02-16 RX ADMIN — LISINOPRIL 30 MG: 20 TABLET ORAL at 08:51

## 2023-02-16 NOTE — PROGRESS NOTES
Hospitalist Progress Note  Jessica Roberts MD  Answering service: [phone] from in house phone        Date of Service:  2023  NAME:  Nicky Chris  :  1955  MRN:  [id number]      Admission Summary:     Nicky Chris is a 78 y.o. 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 Neurology and now Psychiatry consulted. Interval history / Subjective:     Patient seen and examined. Patient looks depressed. Family and friends in the room. She said she feels the same. She is agreeable to go to psych unit for further management     Assessment & Plan:     Acute AMS  Possible paranoid schizophrenia versus conversion disorder  --CT Head, CTA H/N, MRI Brain all with no acute intracranial process  --EEG --patient refusing  -- Continue neurochecks, supportive care  --Psychiatrist on board  --Patient is medically cleared for psych transfer    Menière Disease  -- Regimen includes weekly MTX followed by 2 days of Leucovorin  -- No evidence of MTX toxicity in lab work-  -- Rheumatologist--Dr. Caryn Fagan, office on  and reviewed medication list and no recent changes, confirmed the weekly MTX and Leucovorin and folic acid.      HTN  -- Continue home Lisinopril 30mg daily, monitor BP     Hx of Ocular Migraines  -- No current exacerbation    Code status: Full Code  Prophylaxis: LVX  Care Plan discussed with: Pt, RN   Anticipated Disposition: Patient is medically stable to be transferred to psych unit     Hospital Problems  Never Reviewed            Codes Class Noted POA    Altered mental status ICD-10-CM: R41.82  ICD-9-CM: 780.97  2023 Unknown       Review of Systems:   A comprehensive review of systems was negative except for that written in the HPI. Vital Signs:    Last 24hrs VS reviewed since prior progress note. Most recent are:  Visit Vitals  BP (!) 162/89 (BP 1 Location: Left upper arm, BP Patient Position: At rest)   Pulse (!) 110   Temp 98.8 °F (37.1 °C)   Resp 17   Ht 5' 8\" (1.727 m)   Wt 53.1 kg (117 lb)   SpO2 100%   Breastfeeding No   BMI 17.79 kg/m²       No intake or output data in the 24 hours ending 02/16/23 1326     Physical Examination:     I had a face to face encounter with this patient and independently examined them on 2/16/2023 as outlined below:          General : Awake, alert. Not in acute cardiorespiratory distress  HEENT: PEERL, EOMI, moist mucus membrane  Neck: supple  Chest: Clear to auscultation bilaterally   CVS: RRR, no murmurs appreciated   Abd: soft/ non tender, non distended  Ext: No edema  Neuro/Psych: Patient is depressed, conscious and alert, oriented to place and person, CN 2-12 grossly intact. Sensation intact to light touch with no extinction. Moves all extremities spontaneously antigravity   Skin: warm and well perfused           Data Review:    Review and/or order of clinical lab test  Review and/or order of tests in the radiology section of CPT  Review and/or order of tests in the medicine section of CPT    I have independently reviewed and interpreted patient's lab and all other diagnostic data    Notes reviewed from all clinical/nonclinical/nursing services involved in patient's clinical care. Care coordination discussions were held with appropriate clinical/nonclinical/ nursing providers based on care coordination needs.      Labs:     Recent Labs     02/14/23  0451   WBC 4.9   HGB 12.8   HCT 38.8          Recent Labs     02/14/23 0451      K 3.8   *   CO2 26   BUN 17   CREA 0.71   *   CA 9.7   MG 2.6*   PHOS 3.4       Recent Labs     02/14/23 0451   ALT 35   AP 54   TBILI 2.1*   TP 7.4   ALB 3.9   GLOB 3.5       No results for input(s): INR, PTP, APTT, INREXT, INREXT in the last 72 hours. No results for input(s): FE, TIBC, PSAT, FERR in the last 72 hours. Lab Results   Component Value Date/Time    Folate 31.4 (H) 02/13/2023 01:10 PM        No results for input(s): PH, PCO2, PO2 in the last 72 hours. No results for input(s): CPK, CKNDX, TROIQ in the last 72 hours.     No lab exists for component: CPKMB  Lab Results   Component Value Date/Time    Cholesterol, total 171 02/14/2023 04:51 AM    HDL Cholesterol 87 02/14/2023 04:51 AM    LDL, calculated 76.2 02/14/2023 04:51 AM    Triglyceride 39 02/14/2023 04:51 AM    CHOL/HDL Ratio 2.0 02/14/2023 04:51 AM     Lab Results   Component Value Date/Time    Glucose (POC) 122 (H) 02/12/2023 08:17 PM    Glucose (POC) 130 (H) 02/12/2023 08:00 PM     Lab Results   Component Value Date/Time    Color YELLOW/STRAW 02/12/2023 09:54 PM    Appearance CLEAR 02/12/2023 09:54 PM    pH (UA) 7.0 02/12/2023 09:54 PM    Protein Negative 02/12/2023 09:54 PM    Glucose Negative 02/12/2023 09:54 PM    Ketone Negative 02/12/2023 09:54 PM    Bilirubin Negative 02/12/2023 09:54 PM    Urobilinogen 0.2 02/12/2023 09:54 PM    Nitrites Negative 02/12/2023 09:54 PM    Leukocyte Esterase Negative 02/12/2023 09:54 PM    Epithelial cells FEW 02/12/2023 09:54 PM    Bacteria Negative 02/12/2023 09:54 PM    WBC 0-4 02/12/2023 09:54 PM    RBC 0-5 02/12/2023 09:54 PM         Medications Reviewed:     Current Facility-Administered Medications   Medication Dose Route Frequency    divalproex DR (DEPAKOTE) tablet 250 mg  250 mg Oral BID    OLANZapine (ZyPREXA) tablet 5 mg  5 mg Oral QHS    OLANZapine (ZyPREXA) tablet 2.5 mg  2.5 mg Oral Q4H PRN    OLANZapine (ZyPREXA) 5 mg in sterile water (preservative free) 1 mL injection  5 mg IntraMUSCular Q4H PRN    ondansetron (ZOFRAN) injection 4 mg  4 mg IntraVENous Q8H PRN    acetaminophen (TYLENOL) tablet 650 mg  650 mg Oral Q4H PRN    Or    acetaminophen (TYLENOL) solution 650 mg  650 mg Per NG tube Q4H PRN    Or    acetaminophen (TYLENOL) suppository 650 mg  650 mg Rectal Q4H PRN    lisinopriL (PRINIVIL, ZESTRIL) tablet 30 mg  30 mg Oral DAILY    folic acid (FOLVITE) tablet 3 mg  3 mg Oral DAILY    enoxaparin (LOVENOX) injection 40 mg  40 mg SubCUTAneous Q24H     ______________________________________________________________________  EXPECTED LENGTH OF STAY: 2d 12h  ACTUAL LENGTH OF STAY:          3                 Danny Elliott MD

## 2023-02-16 NOTE — BSMART NOTE
BSMART Liaison Team Note     LOS:  3     Patient goal(s) for today: communicate needs to staff, continue prescribed medications  BSMART Liaison team focus/goals: assess MH needs, provide support and education    Progress note:   Pt is received, sitting up in a chair, talking to her 2 friends Robbi Nicholas and Cari Brown) with sitter present. She is alert, oriented to self and place, but too distractible to stay on task answering follow-up questions. Pt's thoughts are mildly disorganized with delusional content and flight of ideas. She is pleasant and polite, with mildly blunted affect, speech is slow/low volume, fair eye contact, psychomotor retardation noted. She reports she is thankful to be alive and repeats \"Thank God\" slowly, 4 times. She then turns to her friends and insists they write down the information on her arm band to relay to Russell (?). Pt turns to this writer and, again, states \"Yes, I am so thankful to be alive. So thankful to be alive. I had forgotten so many things when I wasn't alive\". Pt asks \"Will you please, please promise me - this is last time I will be in this much pain? \"  Liaison assured pt she has a wonderful team helping her, and she appeared pleased by this. Pt reports knowing this writer and perseverates on this for a few minutes, eventually switching the topic several times. Pt requested privacy to use the restroom and as Liaison left the room, pt began yelling [facility]! Michaela! Michaela! Michaela! \". Pt's RN Storm Ricardo reports pt refused her Depakote and Zyprexa, last night - taking her 1st dose of Depakote, this morning. Storm Ricardo reports pt has been yelling out all morning but, thankfully, her friends have been with her to keep her from possible escalation. Liaison will continue to follow pt daily.        Barriers to Discharge: medical & psychiatric  Guns in the home: no      Outpatient provider(s):  bipolar  Insurance info/prescription coverage:  [employer] [facility] PPO     Diagnosis:  altered mental status. Plan:  Recommended for [facility] admission once medically cleared. Refer to psychiatric consult note. If Pt does not agree with transfer call [address] [place] [address] ([phone]) to come complete a TDO Prescreen assessment. Follow up Psych Consult placed? yes.    Psychiatrist updated? no      Participating treatment team members: Donnie Melchor LCSW

## 2023-02-17 ENCOUNTER — HOSPITAL ENCOUNTER (INPATIENT)
Age: 68
LOS: 5 days | Discharge: HOME | DRG: 885 | End: 2023-02-22
Attending: PSYCHIATRY & NEUROLOGY | Admitting: PSYCHIATRY & NEUROLOGY
Payer: COMMERCIAL

## 2023-02-17 VITALS
HEIGHT: 68 IN | OXYGEN SATURATION: 97 % | WEIGHT: 117 LBS | HEART RATE: 97 BPM | SYSTOLIC BLOOD PRESSURE: 113 MMHG | DIASTOLIC BLOOD PRESSURE: 80 MMHG | RESPIRATION RATE: 15 BRPM | BODY MASS INDEX: 17.73 KG/M2 | TEMPERATURE: 98.6 F

## 2023-02-17 LAB
ATRIAL RATE: 97 BPM
CALCULATED P AXIS, ECG09: 72 DEGREES
CALCULATED R AXIS, ECG10: 60 DEGREES
CALCULATED T AXIS, ECG11: 53 DEGREES
DIAGNOSIS, 93000: NORMAL
P-R INTERVAL, ECG05: 150 MS
Q-T INTERVAL, ECG07: 364 MS
QRS DURATION, ECG06: 70 MS
QTC CALCULATION (BEZET), ECG08: 462 MS
VENTRICULAR RATE, ECG03: 97 BPM

## 2023-02-17 PROCEDURE — 74011250637 HC RX REV CODE- 250/637

## 2023-02-17 PROCEDURE — 74011250636 HC RX REV CODE- 250/636: Performed by: PHYSICIAN ASSISTANT

## 2023-02-17 PROCEDURE — 74011250637 HC RX REV CODE- 250/637: Performed by: HOSPITALIST

## 2023-02-17 PROCEDURE — 65220000003 HC RM SEMIPRIVATE PSYCH

## 2023-02-17 PROCEDURE — 74011250637 HC RX REV CODE- 250/637: Performed by: PHYSICIAN ASSISTANT

## 2023-02-17 PROCEDURE — 74011000250 HC RX REV CODE- 250: Performed by: PSYCHIATRY & NEUROLOGY

## 2023-02-17 PROCEDURE — 74011250637 HC RX REV CODE- 250/637: Performed by: PSYCHIATRY & NEUROLOGY

## 2023-02-17 PROCEDURE — 74011250636 HC RX REV CODE- 250/636: Performed by: PSYCHIATRY & NEUROLOGY

## 2023-02-17 RX ORDER — OLANZAPINE 2.5 MG/1
2.5 TABLET ORAL
Status: CANCELLED | OUTPATIENT
Start: 2023-02-17

## 2023-02-17 RX ORDER — ACETAMINOPHEN 650 MG/1
650 SUPPOSITORY RECTAL
Status: CANCELLED | OUTPATIENT
Start: 2023-02-17

## 2023-02-17 RX ORDER — ADHESIVE BANDAGE
30 BANDAGE TOPICAL DAILY PRN
Status: DISCONTINUED | OUTPATIENT
Start: 2023-02-17 | End: 2023-02-22 | Stop reason: HOSPADM

## 2023-02-17 RX ORDER — AMLODIPINE BESYLATE 5 MG/1
10 TABLET ORAL DAILY
Status: CANCELLED | OUTPATIENT
Start: 2023-02-18

## 2023-02-17 RX ORDER — OLANZAPINE 2.5 MG/1
2.5 TABLET ORAL DAILY
Status: CANCELLED | OUTPATIENT
Start: 2023-02-18

## 2023-02-17 RX ORDER — ACETAMINOPHEN 325 MG/1
650 TABLET ORAL
Status: CANCELLED | OUTPATIENT
Start: 2023-02-17

## 2023-02-17 RX ORDER — AMLODIPINE BESYLATE 5 MG/1
10 TABLET ORAL DAILY
Status: DISCONTINUED | OUTPATIENT
Start: 2023-02-17 | End: 2023-02-17 | Stop reason: HOSPADM

## 2023-02-17 RX ORDER — HALOPERIDOL 5 MG/ML
2.5 INJECTION INTRAMUSCULAR
Status: DISCONTINUED | OUTPATIENT
Start: 2023-02-17 | End: 2023-02-22 | Stop reason: HOSPADM

## 2023-02-17 RX ORDER — RISPERIDONE 1 MG/1
0.5 TABLET, FILM COATED ORAL
Status: DISCONTINUED | OUTPATIENT
Start: 2023-02-17 | End: 2023-02-18

## 2023-02-17 RX ORDER — FOLIC ACID 1 MG/1
3 TABLET ORAL DAILY
Status: CANCELLED | OUTPATIENT
Start: 2023-02-18

## 2023-02-17 RX ORDER — DIPHENHYDRAMINE HYDROCHLORIDE 50 MG/ML
25 INJECTION, SOLUTION INTRAMUSCULAR; INTRAVENOUS
Status: DISCONTINUED | OUTPATIENT
Start: 2023-02-17 | End: 2023-02-22 | Stop reason: HOSPADM

## 2023-02-17 RX ORDER — DIVALPROEX SODIUM 250 MG/1
250 TABLET, DELAYED RELEASE ORAL 2 TIMES DAILY
Status: CANCELLED | OUTPATIENT
Start: 2023-02-17

## 2023-02-17 RX ORDER — OLANZAPINE 2.5 MG/1
2.5 TABLET ORAL
Status: DISCONTINUED | OUTPATIENT
Start: 2023-02-17 | End: 2023-02-22 | Stop reason: HOSPADM

## 2023-02-17 RX ORDER — BENZTROPINE MESYLATE 1 MG/1
0.5 TABLET ORAL
Status: DISCONTINUED | OUTPATIENT
Start: 2023-02-17 | End: 2023-02-22 | Stop reason: HOSPADM

## 2023-02-17 RX ORDER — ACETAMINOPHEN 325 MG/1
650 TABLET ORAL
Status: DISCONTINUED | OUTPATIENT
Start: 2023-02-17 | End: 2023-02-18

## 2023-02-17 RX ADMIN — OLANZAPINE 5 MG: 10 INJECTION, POWDER, LYOPHILIZED, FOR SOLUTION INTRAMUSCULAR at 02:52

## 2023-02-17 RX ADMIN — DIVALPROEX SODIUM 250 MG: 250 TABLET, DELAYED RELEASE ORAL at 17:09

## 2023-02-17 RX ADMIN — AMLODIPINE BESYLATE 10 MG: 5 TABLET ORAL at 09:25

## 2023-02-17 RX ADMIN — OLANZAPINE 2.5 MG: 2.5 TABLET, FILM COATED ORAL at 00:29

## 2023-02-17 RX ADMIN — DIVALPROEX SODIUM 250 MG: 250 TABLET, DELAYED RELEASE ORAL at 09:25

## 2023-02-17 RX ADMIN — FOLIC ACID 3 MG: 1 TABLET ORAL at 09:25

## 2023-02-17 RX ADMIN — OLANZAPINE 2.5 MG: 2.5 TABLET, FILM COATED ORAL at 09:25

## 2023-02-17 RX ADMIN — LISINOPRIL 30 MG: 20 TABLET ORAL at 09:25

## 2023-02-17 RX ADMIN — RISPERIDONE 0.5 MG: 1 TABLET ORAL at 22:45

## 2023-02-17 NOTE — PROGRESS NOTES
Problem: Patient Education: Go to Patient Education Activity  Goal: Patient/Family Education  Outcome: Progressing Towards Goal     Problem: Falls - Risk of  Goal: *Absence of Falls  Description: Document Domingo Bethea Fall Risk and appropriate interventions in the flowsheet.   Outcome: Progressing Towards Goal  Note: Fall Risk Interventions:  Mobility Interventions: Bed/chair exit alarm    Mentation Interventions: Bed/chair exit alarm         Elimination Interventions: Call light in reach
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
## 2023-02-17 NOTE — PROGRESS NOTES
Hospitalist Progress Note  Charisse Rivera MD  Answering service: [phone] OR 36 from in house phone        Date of Service:  2023  NAME:  Delia Condon  :  1955  MRN:  [id number]      Admission Summary:     Delia Condon is a 78 y.o. 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 Neurology and now Psychiatry consulted. Interval history / Subjective:     Patient seen and examined. Patient looks anxious, confused and has visual hallucination. Assessment & Plan:     Acute AMS  Possible paranoid schizophrenia versus conversion disorder  --CT Head, CTA H/N, MRI Brain all with no acute intracranial process  --EEG --patient refusing  -- Continue neurochecks, supportive care  --Psychiatrist on board  --Patient is medically cleared for psych transfer    Menière Disease  -- Regimen includes weekly MTX followed by 2 days of Leucovorin  -- No evidence of MTX toxicity in lab work-  -- Rheumatologist--Dr. Thomas Douglass, office on  and reviewed medication list and no recent changes, confirmed the weekly MTX and Leucovorin and folic acid. HTN  -- BP stable, continue home Lisinopril 30mg daily, monitor BP     Hx of Ocular Migraines  -- No current exacerbation    Code status: Full Code  Prophylaxis: LVX  Care Plan discussed with: Pt, RN   Anticipated Disposition: Patient is medically stable to be transferred to psych unit     Hospital Problems  Never Reviewed            Codes Class Noted POA    Altered mental status ICD-10-CM: R41.82  ICD-9-CM: 780.97  2023 Unknown              Vital Signs:    Last 24hrs VS reviewed since prior progress note.  Most recent are:  Visit Vitals  BP (!) 174/98 Pulse 98   Temp 97.6 °F (36.4 °C)   Resp 18   Ht 5' 8\" (1.727 m)   Wt 53.1 kg (117 lb)   SpO2 100%   Breastfeeding No   BMI 17.79 kg/m²       No intake or output data in the 24 hours ending 02/17/23 1212     Physical Examination:     I had a face to face encounter with this patient and independently examined them on 2/17/2023 as outlined below:          General : Awake, alert. Not in acute cardiorespiratory distress  HEENT: PEERL, EOMI, moist mucus membrane  Neck: supple  Chest: Clear to auscultation bilaterally   CVS: RRR, no murmurs appreciated   Abd: soft/ non tender, non distended  Ext: No edema  Neuro/Psych: Patient is anxious, conscious and alert, oriented to place and person, CN 2-12 grossly intact. Sensation intact to light touch with no extinction. Moves all extremities spontaneously antigravity   Skin: warm and well perfused           Data Review:    Review and/or order of clinical lab test  Review and/or order of tests in the radiology section of CPT  Review and/or order of tests in the medicine section of CPT    I have independently reviewed and interpreted patient's lab and all other diagnostic data    Notes reviewed from all clinical/nonclinical/nursing services involved in patient's clinical care. Care coordination discussions were held with appropriate clinical/nonclinical/ nursing providers based on care coordination needs. Labs:     No results for input(s): WBC, HGB, HCT, PLT, HGBEXT, HCTEXT, PLTEXT, HGBEXT, HCTEXT, PLTEXT in the last 72 hours. No results for input(s): NA, K, CL, CO2, BUN, CREA, GLU, CA, MG, PHOS, URICA in the last 72 hours. No results for input(s): ALT, AP, TBIL, TBILI, TP, ALB, GLOB, GGT, AML, LPSE in the last 72 hours. No lab exists for component: SGOT, GPT, AMYP, HLPSE    No results for input(s): INR, PTP, APTT, INREXT, INREXT in the last 72 hours. No results for input(s): FE, TIBC, PSAT, FERR in the last 72 hours.    Lab Results   Component Value Date/Time Folate 31.4 (H) 02/13/2023 01:10 PM        No results for input(s): PH, PCO2, PO2 in the last 72 hours. No results for input(s): CPK, CKNDX, TROIQ in the last 72 hours.     No lab exists for component: CPKMB  Lab Results   Component Value Date/Time    Cholesterol, total 171 02/14/2023 04:51 AM    HDL Cholesterol 87 02/14/2023 04:51 AM    LDL, calculated 76.2 02/14/2023 04:51 AM    Triglyceride 39 02/14/2023 04:51 AM    CHOL/HDL Ratio 2.0 02/14/2023 04:51 AM     Lab Results   Component Value Date/Time    Glucose (POC) 122 (H) 02/12/2023 08:17 PM    Glucose (POC) 130 (H) 02/12/2023 08:00 PM     Lab Results   Component Value Date/Time    Color YELLOW/STRAW 02/12/2023 09:54 PM    Appearance CLEAR 02/12/2023 09:54 PM    pH (UA) 7.0 02/12/2023 09:54 PM    Protein Negative 02/12/2023 09:54 PM    Glucose Negative 02/12/2023 09:54 PM    Ketone Negative 02/12/2023 09:54 PM    Bilirubin Negative 02/12/2023 09:54 PM    Urobilinogen 0.2 02/12/2023 09:54 PM    Nitrites Negative 02/12/2023 09:54 PM    Leukocyte Esterase Negative 02/12/2023 09:54 PM    Epithelial cells FEW 02/12/2023 09:54 PM    Bacteria Negative 02/12/2023 09:54 PM    WBC 0-4 02/12/2023 09:54 PM    RBC 0-5 02/12/2023 09:54 PM         Medications Reviewed:     Current Facility-Administered Medications   Medication Dose Route Frequency    amLODIPine (NORVASC) tablet 10 mg  10 mg Oral DAILY    OLANZapine (ZyPREXA) tablet 7.5 mg  7.5 mg Oral QHS    OLANZapine (ZyPREXA) tablet 2.5 mg  2.5 mg Oral DAILY    divalproex DR (DEPAKOTE) tablet 250 mg  250 mg Oral BID    OLANZapine (ZyPREXA) tablet 2.5 mg  2.5 mg Oral Q4H PRN    OLANZapine (ZyPREXA) 5 mg in sterile water (preservative free) 1 mL injection  5 mg IntraMUSCular Q4H PRN    ondansetron (ZOFRAN) injection 4 mg  4 mg IntraVENous Q8H PRN    acetaminophen (TYLENOL) tablet 650 mg  650 mg Oral Q4H PRN    Or    acetaminophen (TYLENOL) solution 650 mg  650 mg Per NG tube Q4H PRN    Or    acetaminophen (TYLENOL) suppository 650 mg  650 mg Rectal Q4H PRN    lisinopriL (PRINIVIL, ZESTRIL) tablet 30 mg  30 mg Oral DAILY    folic acid (FOLVITE) tablet 3 mg  3 mg Oral DAILY    enoxaparin (LOVENOX) injection 40 mg  40 mg SubCUTAneous Q24H     ______________________________________________________________________  EXPECTED LENGTH OF STAY: 6d 4h  ACTUAL LENGTH OF STAY:          4                 Virgen Martinez MD

## 2023-02-17 NOTE — PROGRESS NOTES
Pt. Discharged upstairs to psych unit. ,  and RN accompanied her upstairs. Pt. Chart given to psych RN as well as TDO paperwork.

## 2023-02-17 NOTE — DISCHARGE SUMMARY
Discharge Summary       PATIENT ID: Dangelo Torrez  MRN: [id number]   YOB: 1955    DATE OF ADMISSION: 2/12/2023  8:00 PM    DATE OF DISCHARGE: 2/17/2023  PRIMARY CARE PROVIDER: None     ATTENDING PHYSICIAN: Saul Rodriguez MD   DISCHARGING PROVIDER: Caio Fulton MD      CONSULTATIONS: IP CONSULT TO HOSPITALIST  IP CONSULT TO NEUROLOGY  IP CONSULT TO NEUROLOGY  IP CONSULT TO PSYCHIATRY  IP CONSULT TO PSYCHIATRY    PROCEDURES/SURGERIES: * No surgery found *    ADMISSION SUMMARY AND HOSPITAL COURSE:     Malu Benoit is a 78 y.o. 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 Neurology and now Psychiatry consulted. Acute AMS  Paranoid schizophrenia   -patient is conscious and alert, confused, and hallucinating  -CT Head, CTA H/N, MRI Brain all with no acute intracranial process  -EEG --patient refusing  -Continue neurochecks, supportive care  -Psychiatrist on board  -Patient is medically cleared for psych transfer  Menière Disease  -Regimen includes weekly MTX followed by 2 days of Leucovorin  -No evidence of MTX toxicity in lab work-  -Rheumatologist--Dr. Susan Glass, office on 2/13 and reviewed medication list and no recent changes, confirmed the weekly MTX and Leucovorin and folic acid.     HTN  -BP stable, continue home Lisinopril 30mg daily, monitor BP  Hx of Ocular Migraines  -No current exacerbation     Code status: Full Code  Prophylaxis: LVX      DISCHARGE DIAGNOSES / PLAN:      Acute AMS  Paranoid schizophrenia   -patient is conscious and alert, but confused and hallucinating  -continue divalproex, zyprexa  -further management per Psychiatrist   Menière Disease  -outpatient follow up with Rheumatologist   HTN  -BP not at goal, continue home Lisinopril 30mg daily, monitor BP  Hx of Ocular Migraines  -No current exacerbation    ADDITIONAL CARE RECOMMENDATIONS:     Management      DIET: Regular Diet    ACTIVITY: Activity as tolerated    EQUIPMENT needed: None     NOTIFY YOUR PHYSICIAN FOR ANY OF THE FOLLOWING:   Fever over 101 degrees for 24 hours. Chest pain, shortness of breath, fever, chills, nausea, vomiting, diarrhea, change in mentation, falling, weakness, bleeding. Severe pain or pain not relieved by medications, as well as any other signs or symptoms that you may have questions about. FOLLOW UP APPOINTMENTS:    Follow-up Information      Transfer to psychiatry Unit     DISCHARGE MEDICATIONS:  Current Discharge Medication List          DISPOSITION:    Home With:   OT  PT  HH  RN       Long term SNF/Inpatient Rehab    Independent/assisted living    Hospice   x Psychiatry Unit      PATIENT CONDITION AT DISCHARGE:     Functional status    Poor     Deconditioned    x Independent      Cognition     Lucid    x Forgetful     Dementia      Catheters/lines (plus indication)    Em     PICC     PEG    x None      Code status     Full code     DNR      PHYSICAL EXAMINATION AT DISCHARGE:    Patient Vitals for the past 24 hrs:   Temp Pulse Resp BP SpO2   02/17/23 1601 -- (!) 102 -- (!) 151/92 --   02/17/23 1527 97.7 °F (36.5 °C) (!) 103 18 (!) 177/93 98 %   02/17/23 1226 97.8 °F (36.6 °C) 94 18 (!) 100/57 100 %   02/17/23 0259 97.6 °F (36.4 °C) 98 18 (!) 174/98 100 %   02/16/23 2035 98.4 °F (36.9 °C) 97 16 (!) 169/89 99 %        General:          Alert, cooperative, no distress, appears stated age. HEENT:           Atraumatic, anicteric sclerae, pink conjunctivae                          No oral ulcers, mucosa moist, throat clear, dentition fair  Neck:               Supple, symmetrical  Lungs:             Clear to auscultation bilaterally. No Wheezing or Rhonchi. No rales. Chest wall:      No tenderness  No Accessory muscle use.   Heart:              Regular  rhythm,  No  murmur No edema  Abdomen:        Soft, non-tender. Not distended. Bowel sounds normal  Extremities:     No cyanosis. No clubbing,                            Skin turgor normal, Capillary refill normal  Skin:                Not pale. Not Jaundiced  No rashes   Psych:             Not anxious or agitated.   Neurologic:      Alert, moves all extremities, answers simple questions and responds to commands       Recent Results (from the past 24 hour(s))   COVID-19 RAPID TEST    Collection Time: 02/16/23  8:02 PM   Result Value Ref Range    Specimen source Nasopharyngeal      COVID-19 rapid test Not detected NOTD        CHRONIC MEDICAL DIAGNOSES:  Problem List as of 2/17/2023 Never Reviewed            Codes Class Noted - Resolved    Altered mental status ICD-10-CM: R41.82  ICD-9-CM: 780.97  2/13/2023 - Present           Greater than 45 minutes were spent with the patient on counseling and coordination of care    Signed:   Uma Mendez MD  2/17/2023  5:28 PM

## 2023-02-17 NOTE — BSMART NOTE
BSMART Liaison Team Note      LOS:  4 days               Patient goal(s) for today: communicate needs to staff, continue prescribed medications  BSMART Liaison team focus/goals: assess MH needs, provide support and education     Progress note: Pt was received sitting up in bed finishing her breakfast. Pt had her best friend, Jaylen Starkey, present with her, as well as a 1:1 sitter. Pt's 1:1 sitter pulled this writer aside and said that the pt is experiencing a lot of things that aren't there, such as glass on her hands. This writer introduced herself to the pt and her friend, pt was confused, disoriented, and experiencing many delusions throughout the assessment. Pt was keep inquiring about her friend Ryanne Wright, who Jaylen Starkey said was one of the people the pt had worked with. Pt was beginning to name a couple of people she worked with to come see her and help her get better, pt was reassured by her friend Jaylen Jaky that they live in [place]. Pt kept insisting that they come. Pt was asked by this writer if she can ask a couple questions, pt said no because she needs to see Dr. Divya Nunez and only wants to answer questions from the doctor in order to feel better. Pt kept whispering to her friend Jaylen Starkey to go outside the room and begin shouting for her friend Palestine Gillian to come and for the doctor to come, pt was reassured by her friend Jaylen Starkey throughout the whole assessment and attempted to engage pt in breathing exercises, which she did on/off and ignored at other times. Pt began writing names of people she knows on a napkin, including this writer. Pt was experiencing delusions that the IV on her arm is \"armageddon\" and that she is going to get the death penalty. Pt also whispered to her friend Jaylen Starkey that this Kayla Gottron is going to get the death penalty for standing in front of her. Pt was constantly reassured and attempted to be redirected by this writer and her friend Jaylen Jaky.  Pt told this writer to get out of the room so the doctor can come and make her feel better. Pt's friend Chung Castro told this writer that pt is demonstrating a big decline from yesterday's behaviors and she hopes pt meets with doctor soon. Pt was reassured that the doctor will come as soon as they are available to come see her and was left at bedside with sitter and friend present. This writer was unable to get a hold Sukumar Limon, however, Psych NP, Ravin Miller was notified/updated of the situation regarding the pt. Pt was unable to be inquired about basic questions and orientation questions due to lack of capacity to engage in the assessment. Barriers to Discharge: medical & psychiatric  Guns in the home: no      Outpatient provider(s):  bipolar  Insurance info/prescription coverage:  [employer] PPO     Diagnosis:  altered mental status. Plan:  Recommended for [facility] admission once medically cleared. [address] [place] [address] ([phone]) came to complete TDO Prescreen assessment yesterday (02/16). Follow up Psych Consult placed? yes. Psychiatrist updated?  yes        Participating treatment team members: Adalberto Benoit, Rosibel Hernandez, MSW Student

## 2023-02-17 NOTE — BH NOTES
TRANSFER - IN REPORT:    Verbal report received from ALEXANDRIA Parikh on [employer]  being received from [facility] for routine progression of care      Report consisted of patients Situation, Background, Assessment and   Recommendations(SBAR). Information from the following report(s) SBAR was reviewed with the receiving nurse. Opportunity for questions and clarification was provided. Assessment completed upon patients arrival to unit and care assumed.

## 2023-02-17 NOTE — PROGRESS NOTES
Physical Therapy: The patient was calling out this morning and this afternoon nursing requested we not disturb her. Nursing reports she is ambulating independently to the bathroom. She is to transfer to [facility] this afternoon. At this time it appears her psych issues are greater than her mobility issues considering she is able to ambulate when she desires. Further PT does not appear to be indicated at this time. Will complete the order.

## 2023-02-18 PROBLEM — F31.9 BIPOLAR DISORDER, UNSPECIFIED (HCC): Status: ACTIVE | Noted: 2023-02-18

## 2023-02-18 PROCEDURE — 74011250637 HC RX REV CODE- 250/637: Performed by: PSYCHIATRY & NEUROLOGY

## 2023-02-18 PROCEDURE — 65220000003 HC RM SEMIPRIVATE PSYCH

## 2023-02-18 RX ORDER — ACETAMINOPHEN 325 MG/1
650 TABLET ORAL
Status: DISCONTINUED | OUTPATIENT
Start: 2023-02-18 | End: 2023-02-22 | Stop reason: HOSPADM

## 2023-02-18 RX ORDER — LISINOPRIL 20 MG/1
30 TABLET ORAL DAILY
Status: DISCONTINUED | OUTPATIENT
Start: 2023-02-18 | End: 2023-02-18

## 2023-02-18 RX ORDER — AMLODIPINE BESYLATE 5 MG/1
10 TABLET ORAL DAILY
Status: DISCONTINUED | OUTPATIENT
Start: 2023-02-18 | End: 2023-02-18

## 2023-02-18 RX ORDER — DIVALPROEX SODIUM 250 MG/1
250 TABLET, DELAYED RELEASE ORAL 2 TIMES DAILY
Status: DISCONTINUED | OUTPATIENT
Start: 2023-02-18 | End: 2023-02-22 | Stop reason: HOSPADM

## 2023-02-18 RX ORDER — OLANZAPINE 2.5 MG/1
2.5 TABLET ORAL DAILY
Status: DISCONTINUED | OUTPATIENT
Start: 2023-02-18 | End: 2023-02-19

## 2023-02-18 RX ORDER — FOLIC ACID 1 MG/1
3 TABLET ORAL DAILY
Status: DISCONTINUED | OUTPATIENT
Start: 2023-02-18 | End: 2023-02-22 | Stop reason: HOSPADM

## 2023-02-18 RX ORDER — LISINOPRIL 20 MG/1
30 TABLET ORAL DAILY
Status: DISCONTINUED | OUTPATIENT
Start: 2023-02-18 | End: 2023-02-22 | Stop reason: HOSPADM

## 2023-02-18 RX ORDER — ACETAMINOPHEN 650 MG/1
650 SUPPOSITORY RECTAL
Status: DISCONTINUED | OUTPATIENT
Start: 2023-02-18 | End: 2023-02-22 | Stop reason: HOSPADM

## 2023-02-18 RX ORDER — OLANZAPINE 2.5 MG/1
2.5 TABLET ORAL
Status: DISCONTINUED | OUTPATIENT
Start: 2023-02-18 | End: 2023-02-18

## 2023-02-18 RX ORDER — AMLODIPINE BESYLATE 5 MG/1
2.5 TABLET ORAL DAILY
Status: DISCONTINUED | OUTPATIENT
Start: 2023-02-19 | End: 2023-02-20

## 2023-02-18 RX ORDER — FOLIC ACID 1 MG/1
3 TABLET ORAL DAILY
Status: DISCONTINUED | OUTPATIENT
Start: 2023-02-18 | End: 2023-02-18

## 2023-02-18 RX ADMIN — DIVALPROEX SODIUM 250 MG: 250 TABLET, DELAYED RELEASE ORAL at 16:53

## 2023-02-18 RX ADMIN — DIVALPROEX SODIUM 250 MG: 250 TABLET, DELAYED RELEASE ORAL at 09:10

## 2023-02-18 RX ADMIN — OLANZAPINE 7.5 MG: 5 TABLET, FILM COATED ORAL at 21:53

## 2023-02-18 RX ADMIN — FOLIC ACID 3 MG: 1 TABLET ORAL at 09:10

## 2023-02-18 NOTE — PROGRESS NOTES
Problem: Falls - Risk of  Goal: *Absence of Falls  Description: Document Michelle Valle Fall Risk and appropriate interventions in the flowsheet. Outcome: Progressing Towards Goal  Note: Fall Risk Interventions:       Problem: Patient Education: Go to Patient Education Activity  Goal: Patient/Family Education  Outcome: Progressing Towards Goal     Patient resting/sleeping in room. No complaints or safety issues noted at this time. Will continue to monitor with Q15 minute observation.

## 2023-02-18 NOTE — H&P
[facility]  INITIAL PSYCHIATRIC INTERVIEW:    Name: Katia aGndhi  MR#: [id number]  ACCOUNT#: [de-identified]  : 1955  ADMIT DATE: 2023    CHIEF COMPLAINT: \"I've been having anxiety attacks that have been getting pretty severe. \"     HISTORY OF PRESENTING COMPLAINT:  Malu Benoit is a 78 y.o. WHITE/NON- female who is currently admitted to the psychiatric floor at [facility] on a voluntary basis for acute amanda. Per family's report, this is the first episode of amanda; she has no hx of past inpatient psychiatric treatment. She has been evaluated for medical causes of mental status change, which have all been ruled out. She had not slept at all in 3 days and barely ate over that time frame as well prior to admission. Upon my evaluation, the patient was calm and cooperative. She describes the initial reason for admission was severe panic attacks. She states she passed out in the hospital while some tests were done, and then she does not have much memory of what has been happening the past couple days. She states she remembers bits and pieces. Currently, she is aware she is at [facility]. She is alert time time, place, person, and events. She was able to identify the date. She states she does have memories of the events that led up to her admission. She states she thinks she had a panic attack. She states she accidentally locked herself out of the house when it was freezing rain, the day before she was admitted, and she had only a light jacket and no shoes on. When she finally got inside, the panic attack started. The same day, she states there was a squirrel running around inside her house, which was contributing to anxiety and panic. Her brother and his wife took her to [facility], where she was evaluated. When she got out of a CT, she fell and hit her head, and there was concern that she had had a concussion, which led to an admission.  She had been having what she believes are drug-induced nightmares. She went home, and the next thing she knew, her brother told her she needed to go back to the hospital, because it seemed she wasn't acting like herself. She does not remember specifically what the behavior changes were; in her mind, she was acting this way because she felt it was all connected to the squirrel in her house the day before. She remembers become angry when she felt people didn't listen to her. She was having very delusions about armageddon and the end of the world - she describes them as nightmares, but she acknowledges that in the moment she very much believed they were real.   She states her mood is currently \"good. \" She states she is comfortable, and is feeling much better than she felt yesterday, when she felt that there was a lot of \"very surreal mental imaging\" going on that she did not understand. She is not experiencing any \"mental imaging\" currently, and states she is not currently experiencing hallucinations. She is able to accurately recount the events of this morning. She denies SI/plan/intent and HI/plan/intent. PAST PSYCHIATRIC HISTORY: Hx of anxiety. No hx of inpatient psychiatric admissions, no hx of suicide attempts. PCP is Dr. Dorcas Schuster. She does not have a psychiatrist. She is not taking psychiatric medications, and can remember taking Ativan and Valium in the past. She does have a therapist, who she saw around August or September, but she did not continue with her after 5-6 sessions, for anxiety, which she found very helpful. SUBSTANCE ABUSE HISTORY: None; UDS negative. She drinks about a half a glass of wine, sometimes a full glass, 2-3 times a week. No hx of past substance abuse. No hx of tobacco or vaping. PSYCHOSOCIAL HISTORY: She is  for 30 years. She has no children.  Her biggest social support is her best friend, Yvrose Hernadez, who lives in her neighborhood, as well as her family; she feels she has a wide support network. She works doing consulting part time. No legal hx. Highest level of education is Master's degree. FAMILY HISTORY: Brother - had a psychiatric admission in college, pt is unsure what the reason was, has since resolved. No other known family mental health history. PAST MEDICAL HISTORY: Reviewed per H&P. Hx of HTN, Meniere's disease, migraines, Raynaud's syndrome, and hx of melasma. ALLERGIES: Codeine, lactose, fructose    MENTAL STATUS EXAM: The patient was noted to be a casually dressed, 78 y.o. WHITE/NON- female who is in good grooming. The patient was pleasant, cooperative, and affectively stable. Pt was not dysphoric or depressed. Pt denied thoughts of suicide or thoughts of harming others. There was no agitation or lability noted. Speech was of normal rate, volume, and rhythm. Similarly, there was no evidence of any amanda or hypomania or any symptoms of psychosis, such as hallucinations, delusional thinking, etc. Thought process was linear and organized. Cognitively, the patient showed no signs of any deficits in word-finding, processing speed, or executive functioning, although she does demonstrate impaired short term memory as she cannot recall the events of the last two days. Judgment and insight are noted to be good. DIAGNOSTIC IMPRESSION:   Bipolar 1 disorder, with psychotic features    PLAN:   Continue inpatient stay for the safety and optimum care of the patient. Routine labs to be ordered as needed. Psychotropic medications will be ordered and adjusted as needed. Will continue current medications as ordered currently. At time of assessment, manic episode appears to be resolved - will continue to monitor for a few more days. Supportive, milieu and group therapy.    Estimated length of stay is 5-7 days, dependent upon pt's participation in treatment, response to pharmacological and non-pharmacological interventions, and follow-up plan including outpatient providers and safe environment for discharge. I certify that this patient's inpatient psychiatric hospital admission is medically necessary for treatment which could reasonably be expected to improve this patient's condition. The inpatient psychiatric services are provided while the individual is under the care of a psychiatric provider and are included in the individualized plan of care.

## 2023-02-18 NOTE — BH NOTES
Primary Nurse Kathy Alejandre RN and Sarah Regalado RN performed a dual skin assessment on this patient No impairment noted  Francisco score is 19

## 2023-02-18 NOTE — PROGRESS NOTES
Pt calm and cooperative, out in dayroom reading this afternoon. Gait is steady. Problem: Falls - Risk of  Goal: *Absence of Falls  Description: Document Cherelle Benavides Fall Risk and appropriate interventions in the flowsheet.   Outcome: Progressing Towards Goal  Note: Fall Risk Interventions:

## 2023-02-18 NOTE — BH NOTES
Pt purse and all belongings inside including wallet with with cards, heck book, and phones were given to pt children before entering 7W unit per pt request.

## 2023-02-19 PROCEDURE — 65220000003 HC RM SEMIPRIVATE PSYCH

## 2023-02-19 PROCEDURE — 74011250637 HC RX REV CODE- 250/637: Performed by: PSYCHIATRY & NEUROLOGY

## 2023-02-19 PROCEDURE — 74011250637 HC RX REV CODE- 250/637: Performed by: NURSE PRACTITIONER

## 2023-02-19 PROCEDURE — 97161 PT EVAL LOW COMPLEX 20 MIN: CPT

## 2023-02-19 RX ORDER — OLANZAPINE 5 MG/1
10 TABLET ORAL
Status: DISCONTINUED | OUTPATIENT
Start: 2023-02-19 | End: 2023-02-22 | Stop reason: HOSPADM

## 2023-02-19 RX ORDER — OLANZAPINE 5 MG/1
5 TABLET ORAL DAILY
Status: DISCONTINUED | OUTPATIENT
Start: 2023-02-20 | End: 2023-02-22 | Stop reason: HOSPADM

## 2023-02-19 RX ADMIN — FOLIC ACID 3 MG: 1 TABLET ORAL at 09:07

## 2023-02-19 RX ADMIN — OLANZAPINE 2.5 MG: 2.5 TABLET, FILM COATED ORAL at 09:06

## 2023-02-19 RX ADMIN — DIVALPROEX SODIUM 250 MG: 250 TABLET, DELAYED RELEASE ORAL at 18:07

## 2023-02-19 RX ADMIN — DIVALPROEX SODIUM 250 MG: 250 TABLET, DELAYED RELEASE ORAL at 09:07

## 2023-02-19 RX ADMIN — OLANZAPINE 10 MG: 5 TABLET, FILM COATED ORAL at 20:17

## 2023-02-19 NOTE — PROGRESS NOTES
PHYSICAL THERAPY EVALUATION/DISCHARGE  Patient: Bernie Hoover (41 y.o. female)  Date: 2/19/2023  Primary Diagnosis: [facility] [F31.9]       Precautions:          ASSESSMENT  Based on the objective data described below, the patient presents with overall independent  mobility. Received in standing in nursing station talking with nurse. Most pleasant and agreeable to assessment. She presents with good strength, ROM and balance. Gait is  normal.  She did state felt slightly lightheaded upon standing up from bed this am and sat back down for a moment and then proceeded with day. Reinforced that that was the most appropriate way to deal with the lightheadedness. At this time, no acute PT needs and will sign off. .    Functional Outcome Measure: The patient scored 56/56 on the Mckeon outcome measure which is indicative of low fall risk. Other factors to consider for discharge:      Further skilled acute physical therapy is not indicated at this time. PLAN :  Recommendation for discharge: (in order for the patient to meet his/her long term goals)  No skilled physical therapy/ follow up rehabilitation needs identified at this time. This discharge recommendation:  Has not yet been discussed the attending provider and/or case management    IF patient discharges home will need the following DME: none       SUBJECTIVE:   Patient stated Thank you.     OBJECTIVE DATA SUMMARY:   HISTORY:    No past medical history on file. No past surgical history on file.     Prior level of function: independent  Personal factors and/or comorbidities impacting plan of care: psych history    Home Situation  Home Environment: Private residence  One/Two Story Residence: Other (Comment) (multi level home)  Living Alone: Yes  Support Systems: Other Family Member(s)  Patient Expects to be Discharged to[de-identified] Home  Current DME Used/Available at Home: None    EXAMINATION/PRESENTATION/DECISION MAKING:   Critical Behavior:  Neurologic State: Alert           Hearing: Auditory  Auditory Impairment: None  Range Of Motion:  AROM: Within functional limits           PROM: Within functional limits           Strength:    Strength: Within functional limits                    Tone & Sensation:   Tone: Normal              Sensation: Intact               Coordination:  Coordination: Within functional limits  Functional Mobility:     Transfers:  Sit to Stand: Independent  Stand to Sit: Independent  Stand Pivot Transfers: Independent                    Balance:   Sitting: Intact  Standing: Intact  Standing - Static: Good  Standing - Dynamic : Good  Ambulation/Gait Training:              Gait Description (WDL): Within defined limits              Functional Measure:  Mckeon Balance Test:    Sitting to Standin  Standing Unsupported: 4  Sitting with Back Unsupported: 4  Standing to Sittin  Transfers: 4  Standing Unsupported with Eyes Closed: 4  Standing Unsupported with Feet Together: 4  Reach Forward with Outstretched Arm: 4   Object: 4  Turn to Look Over Shoulders: 4  Turn 360 Degrees: 4  Alternate Foot on Step/Stool: 4  Standing Unsupported One Foot in Front: 4  Stand on One Le  Total: 56/56         56=Maximum possible score;   0-20=High fall risk  21-40=Moderate fall risk   41-56=Low fall risk                Physical Therapy Evaluation Charge Determination   History Examination Presentation Decision-Making   LOW Complexity : Zero comorbidities / personal factors that will impact the outcome / POC LOW Complexity : 1-2 Standardized tests and measures addressing body structure, function, activity limitation and / or participation in recreation  LOW Complexity : Stable, uncomplicated        Based on the above components, the patient evaluation is determined to be of the following complexity level: LOW     Pain Ratin    Activity Tolerance:    WNL      After treatment patient left in no apparent distress:   Walking the unit    COMMUNICATION/EDUCATION:   The patients plan of care was discussed with: Registered nurse.          Thank you for this referral.  Ced Pitt, PT   Time Calculation: 8 mins

## 2023-02-19 NOTE — PROGRESS NOTES
Problem: Altered Thought Process (Adult/Pediatric)  Goal: *STG: Participates in treatment plan  Outcome: Progressing Towards Goal  Goal: *STG: Remains safe in hospital  Outcome: Progressing Towards Goal  Goal: *STG: Seeks staff when feelings of anxiety and fear arise  Outcome: Progressing Towards Goal  Goal: *STG: Complies with medication therapy  Outcome: Progressing Towards Goal  Goal: *STG: Decreased hallucinations  Outcome: Progressing Towards Goal       0730: received patient sitting quietly at the dining room table. Pleasant, cooperative. Reports having slept well last night. Med/meal compliant. Appropriate with staff and peers. 1030: patient has been telling me many, many times today about her \"triggers\" including words, places, and people. She doesn't seem to have an awareness of how much she is talking about it or memory of what she has already told me. She has expressed feeling safe with me and trusting me. She has spoken to me about her peers being people that she has known prior to coming here and how their faces morph into people she knows. She has expressed some paranoid thoughts about people talking about her and all leaving when she enters the room because she smells bad. This writer has not noticed any odors from patient. Patient is intelligent ans articulate and I believe without investigation, may appear to not be experiencing issues.

## 2023-02-19 NOTE — BH NOTES
PSYCHOSOCIAL ASSESSMENT  :Patient identifying info:   Juan Carlos Ochoa is a 78 y.o., female admitted 2023  7:48 PM     Presenting problem and precipitating factors: Pt reported to ED due to acute Kylee. Pt also reports panic attacks. Pt was hospitalized do to possible concussion. Mental status assessment:     Strengths/Recreation/Coping Skills:    Collateral information:     Current psychiatric /substance abuse providers and contact info: Pt denies current psychiatric care. Previous psychiatric/substance abuse providers and response to treatment: Pt denies any previous dx or hospitalizations. Family history of mental illness or substance abuse: Pt reports that her brother had a psychiatric admission in college. Substance abuse history:  pt denies substance use. Social History     Tobacco Use    Smoking status: Not on file    Smokeless tobacco: Not on file   Substance Use Topics    Alcohol use: Not on file       History of biomedical complications associated with substance abuse: no     Patient's current acceptance of treatment or motivation for change: voluntary    Family constellation: , no children     Is significant other involved? No     Describe support system: Mauricio Jean, best friend.      Describe living arrangements and home environment: Pt lives alone    GUARDIAN/POA: NO    Guardian Name:     Guardian Contact:     Health issues:   Hospital Problems  Never Reviewed            Codes Class Noted POA    B[facility], unspecified ([facility] ICD-10-CM: F31.9  ICD-9-CM: 296.80  2023 Unknown           Trauma history: no     Legal issues: no     History of  service: no     Financial status: unknown     Yarsani/cultural factors: not reported     Education/work history: Masters degree; part time consulting     Have you been licensed as a health care professional (current or ): no     Describe coping skills: ineffectual     Yuridia Stone  2023

## 2023-02-19 NOTE — BH NOTES
PSYCHIATRIC PROGRESS NOTE    Patient: Jono Martinez MRN: [id number]  SSN: [id number]    YOB: 1955  Age: 78 y.o. Sex: female      Admit Date: 2/17/2023    Length of Stay: 2 Days    Chief Complaint: \"I can't tell what's real life from what's my pretend life. \"     Interval History:   2/19/23- Malu appears slightly more manic today. She is still calm, but her thought process is disorganized. She is talking about writing things down that are going on in her \"alternate reality\" that can help therapists or other professionals that can help them understand what is going on inside her head. Her speech is of normal volume and rate, but tangential and disorganized. She states she has been seeing people in her room, and she thought other people thought that she was the older woman who she saw in her room. She then states she thinks she looks good for her age, and she thinks she thinks she has taken a turn for the better. She is exhibiting loose associations, jumping from topic to topic, exhibiting tangentiality. She is having a difficult time organizing her thoughts. She is medication compliant, denies side effects, but she states she is getting migraines. She continues to exhibit perceptual disturbances, and recalls what she describes as nightmares, but what sounds more like a delusion. She states she saw her nephew, but then she realized that was a prior version of herself, here in the hospital. She endorses auditory hallucinations as well; she states she hears other people near her talking, and then she responds to them, but the feels it is an inappropriate response. She states in her nightmares, the term \"OCD\" is a trigger for her. Her behavior is disorganized; when asked for clarification about the OCD comment she just made, she pulled out a tissue and began to speak about something entirely unrelated. It appears she is confusing active hallucinations and delusions with memories and dreams.  She denies SI/plan/intent and HI/plan/intent. She mentioned watching videos recently, but then she stated she has realized the videos are hallucinations. Reality testing is poor. She states she is unable to identify what is real and what is in her head. She states it is possible she and I have been working together for a very long time (first time meeting her was yesterday). Past Medical History:  No past medical history on file. Labs:  Lab Results   Component Value Date/Time    WBC 4.9 02/14/2023 04:51 AM    HGB 12.8 02/14/2023 04:51 AM    HCT 38.8 02/14/2023 04:51 AM    PLATELET 056 35/46/6609 04:51 AM    MCV 98.7 02/14/2023 04:51 AM      Lab Results   Component Value Date/Time    Sodium 140 02/14/2023 04:51 AM    Potassium 3.8 02/14/2023 04:51 AM    Chloride 109 (H) 02/14/2023 04:51 AM    CO2 26 02/14/2023 04:51 AM    Anion gap 5 02/14/2023 04:51 AM    Glucose 113 (H) 02/14/2023 04:51 AM    BUN 17 02/14/2023 04:51 AM    Creatinine 0.71 02/14/2023 04:51 AM    BUN/Creatinine ratio 24 (H) 02/14/2023 04:51 AM    Calcium 9.7 02/14/2023 04:51 AM    Bilirubin, total 2.1 (H) 02/14/2023 04:51 AM    Alk.  phosphatase 54 02/14/2023 04:51 AM    Protein, total 7.4 02/14/2023 04:51 AM    Albumin 3.9 02/14/2023 04:51 AM    Globulin 3.5 02/14/2023 04:51 AM    A-G Ratio 1.1 02/14/2023 04:51 AM    ALT (SGPT) 35 02/14/2023 04:51 AM      Vitals:    02/18/23 1859 02/19/23 0806 02/19/23 0844 02/19/23 1117   BP: 111/78 96/66  109/67   Pulse: 82 78  90   Resp: 16 16  16   Temp: 98.2 °F (36.8 °C) 98.1 °F (36.7 °C)  98.1 °F (36.7 °C)   SpO2: 100% 98%     Weight:   49.5 kg (109 lb 3.2 oz)        Current Facility-Administered Medications   Medication Dose Route Frequency Provider Last Rate Last Admin    acetaminophen (TYLENOL) tablet 650 mg  650 mg Oral Q4H PRN aJrrett Love MD        Or    acetaminophen (TYLENOL) solution 650 mg  650 mg Per NG tube Q4H PRN Jarrett Love MD        Or    acetaminophen (TYLENOL) suppository 650 mg 650 mg Rectal Q4H PRN Jarrett Love MD        divalproex DR (DEPAKOTE) tablet 250 mg  250 mg Oral BID Jarrett Love MD   250 mg at 12/21/07 2636    folic acid (FOLVITE) tablet 3 mg  3 mg Oral DAILY Jarrett Love MD   3 mg at 02/19/23 0907    lisinopriL (PRINIVIL, ZESTRIL) tablet 30 mg  30 mg Oral DAILY Jarrett Love MD        OLANZapine (ZyPREXA) 5 mg in sterile water (preservative free) 1 mL injection  5 mg IntraMUSCular Q4H PRN Jarrett Love MD        OLANZapine (ZyPREXA) tablet 2.5 mg  2.5 mg Oral DAILY Jarrett Love MD   2.5 mg at 02/19/23 0906    OLANZapine (ZyPREXA) tablet 7.5 mg  7.5 mg Oral QHS Jarrett Love MD   7.5 mg at 02/18/23 2153    amLODIPine (NORVASC) tablet 2.5 mg  2.5 mg Oral DAILY Pernell Darnell, DENIA        OLANZapine (ZyPREXA) tablet 2.5 mg  2.5 mg Oral Q6H PRN [facility], DENIA        haloperidol lactate (HALDOL) injection 2.5 mg  2.5 mg IntraMUSCular Q6H PRN [facility], DENIA        benztropine (COGENTIN) tablet 0.5 mg  0.5 mg Oral BID PRN [facility], DENIA        diphenhydrAMINE (BENADRYL) injection 25 mg  25 mg IntraMUSCular BID PRN [facility], DENIA        magnesium hydroxide (MILK OF MAGNESIA) 400 mg/5 mL oral suspension 30 mL  30 mL Oral DAILY PRN [facility], NP         Mental Status Exam:  Chema Addison is a 78 y.o. female who is well groomed, appears stated age, dressed in casual clothes. Eye contact is fair. Psychomotor activity is WNL  Speech is spontaneous, normal rate, volume, rhythm but tangential and disorganized  Mood is \"OK\"  Affect: mood-congruent, full range  Perception: +AH, +delusions  Thought process: disorganized  Thought content: Denies SI/plan/intent, denies HI/plan/intent  Insight/judgment: poor  Cognition is grossly intact    Physical Exam:  Body habitus: Body mass index is 16.6 kg/m².   Musculoskeletal system: normal gait  Tremor - neg  Cog wheeling - neg    Assessment and Plan:  Malu Benoit meets criteria for a diagnosis of:  Bipolar 1 disorder, with psychotic features    2/19/23- Increasing HS Zyprexa to 10mg for ongoing psychosis, increasing AM dose to 5mg. Reviewed with pt, and she in agreement and aware of these increases. Continue rest of medications as prescribed. A coordinated, multidisplinary treatment team round was conducted with the patient, nurses, pharmcist,  and writer present. Discussions held with , and/or with family members; Complete current electronic health record for patient was reviewed in full including consultant notes, ancillary staff notes, nurses and tech notes, labs and vitals. I certify that this patients inpatient psychiatric hospital services furnished since the previous certification were, and continue to be, required for treatment that could reasonably be expected to improve the patient's condition, or for diagnostic study, and that the patient continues to need, on a daily basis, active treatment furnished directly by or requiring the supervision of inpatient psychiatric facility personnel. In addition, the hospital records show that services furnished were intensive treatment services, admission or related services, or equivalent services.

## 2023-02-20 PROBLEM — F31.89 SEVERE BIPOLAR AFFECTIVE DISORDER WITH PSYCHOSIS (HCC): Status: ACTIVE | Noted: 2023-02-20

## 2023-02-20 LAB
FLUAV RNA SPEC QL NAA+PROBE: NOT DETECTED
FLUBV RNA SPEC QL NAA+PROBE: NOT DETECTED
SARS-COV-2 RNA RESP QL NAA+PROBE: NOT DETECTED
SARS-COV-2, COV2: NORMAL

## 2023-02-20 PROCEDURE — 74011250637 HC RX REV CODE- 250/637: Performed by: PSYCHIATRY & NEUROLOGY

## 2023-02-20 PROCEDURE — 65220000003 HC RM SEMIPRIVATE PSYCH

## 2023-02-20 PROCEDURE — 74011250637 HC RX REV CODE- 250/637: Performed by: NURSE PRACTITIONER

## 2023-02-20 PROCEDURE — 87636 SARSCOV2 & INF A&B AMP PRB: CPT

## 2023-02-20 RX ADMIN — DIVALPROEX SODIUM 250 MG: 250 TABLET, DELAYED RELEASE ORAL at 08:38

## 2023-02-20 RX ADMIN — DIVALPROEX SODIUM 250 MG: 250 TABLET, DELAYED RELEASE ORAL at 17:25

## 2023-02-20 RX ADMIN — OLANZAPINE 5 MG: 5 TABLET, FILM COATED ORAL at 08:37

## 2023-02-20 RX ADMIN — FOLIC ACID 3 MG: 1 TABLET ORAL at 08:38

## 2023-02-20 RX ADMIN — OLANZAPINE 10 MG: 5 TABLET, FILM COATED ORAL at 21:05

## 2023-02-20 NOTE — BH NOTES
GROUP THERAPY PROGRESS NOTE    Patient is participating in recreational therapy group. Group time: 45 minutes    Personal goal for participation: To engage in Kourtney it going ball game. To increase physical activity, promote socialization, improve hand/eye coordination. Goal orientation: Personal    Group therapy participation: active    Therapeutic interventions reviewed and discussed:  Group members were given the opportunity to engage in the Kourtney it going ball game. Members were able to participate in an activity that promotes healthy a outlet while interacting and engaging in discussion. Impression of participation: Pt was present and engaged in group activity.        PARK Allred, [facility]-A

## 2023-02-20 NOTE — PROGRESS NOTES
Problem: Altered Thought Process (Adult/Pediatric)  Goal: *STG: Participates in treatment plan  Outcome: Progressing Towards Goal  Note: Out on unit engaged, demonstrates ability to follow nursing direction, unit routine and interact w peers appropriately. Thought organization improved, sleep adequate at 6 hours, medication compliant. Does not share delusional thinking at this time. Does not demonstrate behaviors reflecting amanda. Daily goal is to discuss her length of stay, discharge and after care follow up. During tx team reviewed her goals with patient verbalizing understanding.  Staff focus is on offering support  Goal: *STG: Seeks staff when feelings of anxiety and fear arise  Outcome: Progressing Towards Goal  Goal: *STG: Attends activities and groups  Outcome: Progressing Towards Goal  Goal: *STG: Demonstrates ability to understand and use improved judgment in daily activities and relationships  Outcome: Progressing Towards Goal  Goal: Interventions  Outcome: Progressing Towards Goal

## 2023-02-20 NOTE — PROGRESS NOTES
Patient out on unit, denies SI, med/meal compliant, calm and cooperative.  Staff focus is to ensure patient remains safe while in the hospital.   Problem: Altered Thought Process (Adult/Pediatric)  Goal: *STG: Remains safe in hospital  Outcome: Progressing Towards Goal  Goal: *STG: Complies with medication therapy  Outcome: Progressing Towards Goal

## 2023-02-20 NOTE — PROGRESS NOTES
Problem: Patient Education: Go to Patient Education Activity  Goal: Patient/Family Education  Outcome: Progressing Towards Goal     Problem: Altered Thought Process (Adult/Pediatric)  Goal: *STG: Participates in treatment plan  Outcome: Progressing Towards Goal     Problem: Altered Thought Process (Adult/Pediatric)  Goal: *STG: Remains safe in hospital  Outcome: Progressing Towards Goal     Patient resting/sleeping in room. No complaints or safety issues noted at this time. Will continue to monitor with Q15 minute observation.

## 2023-02-20 NOTE — PROGRESS NOTES
Laboratory Monitoring for Antipsychotics: This patient is currently prescribed the following medication(s):   Current Facility-Administered Medications   Medication Dose Route Frequency    OLANZapine (ZyPREXA) tablet 10 mg  10 mg Oral QHS    OLANZapine (ZyPREXA) tablet 5 mg  5 mg Oral DAILY    divalproex DR (DEPAKOTE) tablet 250 mg  250 mg Oral BID    folic acid (FOLVITE) tablet 3 mg  3 mg Oral DAILY    lisinopriL (PRINIVIL, ZESTRIL) tablet 30 mg  30 mg Oral DAILY     The following labs have been completed for monitoring of antipsychotics and/or mood stabilizers:    Height, Weight, BMI Estimation  Estimated body mass index is 16.6 kg/m² as calculated from the following:    Height as of 2/12/23: 172.7 cm (68\"). Weight as of this encounter: 49.5 kg (109 lb 3.2 oz). Vital Signs/Blood Pressure  Visit Vitals  BP 93/63   Pulse 81   Temp 97.5 °F (36.4 °C)   Resp 16   Wt 49.5 kg (109 lb 3.2 oz)   SpO2 99%   BMI 16.60 kg/m²     Renal Function, Hepatic Function and Chemistry  Estimated Creatinine Clearance: 60.9 mL/min (by C-G formula based on SCr of 0.71 mg/dL). Lab Results   Component Value Date/Time    Sodium 140 02/14/2023 04:51 AM    Potassium 3.8 02/14/2023 04:51 AM    Chloride 109 (H) 02/14/2023 04:51 AM    CO2 26 02/14/2023 04:51 AM    Anion gap 5 02/14/2023 04:51 AM    BUN 17 02/14/2023 04:51 AM    Creatinine 0.71 02/14/2023 04:51 AM    BUN/Creatinine ratio 24 (H) 02/14/2023 04:51 AM    Bilirubin, total 2.1 (H) 02/14/2023 04:51 AM    Protein, total 7.4 02/14/2023 04:51 AM    Albumin 3.9 02/14/2023 04:51 AM    Globulin 3.5 02/14/2023 04:51 AM    A-G Ratio 1.1 02/14/2023 04:51 AM    ALT (SGPT) 35 02/14/2023 04:51 AM    AST (SGOT) 26 02/14/2023 04:51 AM    Alk.  phosphatase 54 02/14/2023 04:51 AM     Lab Results   Component Value Date/Time    Glucose 113 (H) 02/14/2023 04:51 AM    Glucose (POC) 122 (H) 02/12/2023 08:17 PM    Glucose (POC) 130 (H) 02/12/2023 08:00 PM     Lab Results   Component Value Date/Time    Hemoglobin A1c 5.4 02/14/2023 04:51 AM     Hematology  Lab Results   Component Value Date/Time    WBC 4.9 02/14/2023 04:51 AM    RBC 3.93 02/14/2023 04:51 AM    HGB 12.8 02/14/2023 04:51 AM    HCT 38.8 02/14/2023 04:51 AM    MCV 98.7 02/14/2023 04:51 AM    MCH 32.6 02/14/2023 04:51 AM    MCHC 33.0 02/14/2023 04:51 AM    RDW 13.4 02/14/2023 04:51 AM    PLATELET 893 62/97/4722 04:51 AM     Lipids  Lab Results   Component Value Date/Time    Cholesterol, total 171 02/14/2023 04:51 AM    HDL Cholesterol 87 02/14/2023 04:51 AM    LDL, calculated 76.2 02/14/2023 04:51 AM    Triglyceride 39 02/14/2023 04:51 AM    CHOL/HDL Ratio 2.0 02/14/2023 04:51 AM     Thyroid Function  Lab Results   Component Value Date/Time    TSH 1.75 02/13/2023 02:53 AM     Assessment/Plan:  Recommended baseline laboratory monitoring has been completed based on this patient's current medication regimen. The patient's estimated 10-year ASCVD risk is <7.5%; therefore, the patient is not a candidate for a high-intensity statin. No further intervention needed at this time. Follow-up metabolic monitoring labs should be completed in 3 months (quarterly for the first year of antipsychotic therapy).      Ced Alicia, PHARMD

## 2023-02-20 NOTE — INTERDISCIPLINARY ROUNDS
Behavioral Health Interdisciplinary Rounds     Patient Name: Matthew Jean  Age: 78 y.o. Room/Bed:  2/  Primary Diagnosis: <principal problem not specified>   Admission Status: Involuntary Commitment     Readmission within 30 days: no  Power of  in place: no  Patient requires a blocked bed: no          Reason for blocked bed:       Flu Vaccine :    Consults:          Labs/Testing due today?  Have they refused labs?:     Sleep hours: 8       Participation in Care/Groups:  yes  Medication Compliant?: Yes  PRNS (last 24 hours): None    Restraints (last 24 hours):  no     CIWA (range last 24 hours):     COWS (range last 24 hours):      Alcohol screening (AUDIT) completed -   AUDIT Score: 3     If applicable, date SBIRT discussed in treatment team AND documented:   AUDIT Screen Score:AUDIT Score: 3    ________________________________________________________________  OQ Admission Analysis Survey completed:yes   OQ Admission Analysis Survey score:68  OQ Discharge Analysis Survey completed:  OQ Discharge Analysis Survey score:     _____________________________________________________________________    Tobacco - patient is a smoker: Have You Used Tobacco in the Past 30 Days: No  Illegal Drugs use: Have You Used Any Illegal Substances Over the Past 12 Months: No    24 hour chart check complete: yes   ____________________________________________________________________________________________________________    Patient goal(s) for today:   Treatment team focus/goals: Plan to titrate her medications   Progress note :  She was committed to the hospital this morning ,  she denies SI, denies any AH or VH, she was alert and oriented x's 3, still somewhat disorganized ,     LOS:  3  Expected LOS: TBD     Financial concerns/prescription coverage:  [employer] Medicare   Family contact:       Family requesting physician contact today:    Discharge plan: she will return home when ready for discharge   Access to weapons :  no       Outpatient provider(s): TBD   Patient's preferred phone number for follow up call :   Patient's preferred e-mail address :  Participating treatment team members: Alease Kerns, Brendan Lana Dr. Dulcie Ruths - Darylene Provencal

## 2023-02-20 NOTE — BH NOTES
GROUP THERAPY PROGRESS NOTE    Patient did not participate in psychotherapy group.     Irasema NICK, [facility]

## 2023-02-20 NOTE — BH NOTES
PSYCHIATRIC PROGRESS NOTE    Patient: Maximus Alves MRN: [id number]  SSN: [id number]    YOB: 1955  Age: 78 y.o. Sex: female      Admit Date: 2/17/2023    Length of Stay: 3 Days    Chief Complaint: \"I am almost like myself. \"     Interval History:   02/20/2023  Nursing report that patient slept 6 hours. She's been taking her medications. Patient reports that feels well rested, feels like her usual self, and sounds like her usual self. She feels that her thoughts aren't 'racing,' like before. No passive death wish. She denies SI. She denies experiencing halllucinations of any type. However there is still residual paranoid delusions. She asks if we met at [facility] playing trivia with her brother Papo Castillo, but that is not actually true. 2/19/23- Malu appears slightly more manic today. She is still calm, but her thought process is disorganized. She is talking about writing things down that are going on in her \"alternate reality\" that can help therapists or other professionals that can help them understand what is going on inside her head. Her speech is of normal volume and rate, but tangential and disorganized. She states she has been seeing people in her room, and she thought other people thought that she was the older woman who she saw in her room. She then states she thinks she looks good for her age, and she thinks she thinks she has taken a turn for the better. She is exhibiting loose associations, jumping from topic to topic, exhibiting tangentiality. She is having a difficult time organizing her thoughts. She is medication compliant, denies side effects, but she states she is getting migraines. She continues to exhibit perceptual disturbances, and recalls what she describes as nightmares, but what sounds more like a delusion.  She states she saw her nephew, but then she realized that was a prior version of herself, here in the hospital. She endorses auditory hallucinations as well; she states she hears other people near her talking, and then she responds to them, but the feels it is an inappropriate response. She states in her nightmares, the term \"OCD\" is a trigger for her. Her behavior is disorganized; when asked for clarification about the OCD comment she just made, she pulled out a tissue and began to speak about something entirely unrelated. It appears she is confusing active hallucinations and delusions with memories and dreams. She denies SI/plan/intent and HI/plan/intent. She mentioned watching videos recently, but then she stated she has realized the videos are hallucinations. Reality testing is poor. She states she is unable to identify what is real and what is in her head. She states it is possible she and I have been working together for a very long time (first time meeting her was yesterday). Past Medical History:  No past medical history on file. Labs:  Lab Results   Component Value Date/Time    WBC 4.9 02/14/2023 04:51 AM    HGB 12.8 02/14/2023 04:51 AM    HCT 38.8 02/14/2023 04:51 AM    PLATELET 515 67/45/2074 04:51 AM    MCV 98.7 02/14/2023 04:51 AM      Lab Results   Component Value Date/Time    Sodium 140 02/14/2023 04:51 AM    Potassium 3.8 02/14/2023 04:51 AM    Chloride 109 (H) 02/14/2023 04:51 AM    CO2 26 02/14/2023 04:51 AM    Anion gap 5 02/14/2023 04:51 AM    Glucose 113 (H) 02/14/2023 04:51 AM    BUN 17 02/14/2023 04:51 AM    Creatinine 0.71 02/14/2023 04:51 AM    BUN/Creatinine ratio 24 (H) 02/14/2023 04:51 AM    Calcium 9.7 02/14/2023 04:51 AM    Bilirubin, total 2.1 (H) 02/14/2023 04:51 AM    Alk.  phosphatase 54 02/14/2023 04:51 AM    Protein, total 7.4 02/14/2023 04:51 AM    Albumin 3.9 02/14/2023 04:51 AM    Globulin 3.5 02/14/2023 04:51 AM    A-G Ratio 1.1 02/14/2023 04:51 AM    ALT (SGPT) 35 02/14/2023 04:51 AM      Vitals:    02/19/23 1117 02/19/23 1549 02/19/23 2045 02/20/23 0735   BP: 109/67 98/60 99/65 93/63   Pulse: 90 96 87 81   Resp: 16 16 16 16   Temp: 98.1 °F (36.7 °C) 98.1 °F (36.7 °C) 97.4 °F (36.3 °C) 97.5 °F (36.4 °C)   SpO2:  100% 100% 99%   Weight:           Current Facility-Administered Medications   Medication Dose Route Frequency Provider Last Rate Last Admin    OLANZapine (ZyPREXA) tablet 10 mg  10 mg Oral QHS Griselda Carrillo NP   10 mg at 02/19/23 2017    OLANZapine (ZyPREXA) tablet 5 mg  5 mg Oral DAILY Griselda DENIA Carrillo   5 mg at 02/20/23 8875    acetaminophen (TYLENOL) tablet 650 mg  650 mg Oral Q4H PRN Jarrett Love MD        Or    acetaminophen (TYLENOL) solution 650 mg  650 mg Per NG tube Q4H PRN Jarrett Love MD        Or    acetaminophen (TYLENOL) suppository 650 mg  650 mg Rectal Q4H PRN Jarrett Love MD        divalproex DR (DEPAKOTE) tablet 250 mg  250 mg Oral BID Jarrett Love MD   250 mg at 24/16/03 9655    folic acid (FOLVITE) tablet 3 mg  3 mg Oral DAILY Jarrett Love MD   3 mg at 02/20/23 3145    lisinopriL (PRINIVIL, ZESTRIL) tablet 30 mg  30 mg Oral DAILY Jarrett Love MD        OLANZapine (ZyPREXA) 5 mg in sterile water (preservative free) 1 mL injection  5 mg IntraMUSCular Q4H PRN Jarrett Love MD        OLANZapine (ZyPREXA) tablet 2.5 mg  2.5 mg Oral Q6H PRN Saloni Limb, NP        haloperidol lactate (HALDOL) injection 2.5 mg  2.5 mg IntraMUSCular Q6H PRN Saloni Limb, NP        benztropine (COGENTIN) tablet 0.5 mg  0.5 mg Oral BID PRN Saloni Limb, NP        diphenhydrAMINE (BENADRYL) injection 25 mg  25 mg IntraMUSCular BID PRN Mikel Dixon, NP        magnesium hydroxide (MILK OF MAGNESIA) 400 mg/5 mL oral suspension 30 mL  30 mL Oral DAILY PRN Mikel Ornelas, DENIA         Mental Status Exam:  Matthew Jean is a 78 y.o. female who is well groomed, appears stated age, dressed in casual clothes. Eye contact is fair.    Psychomotor activity is WNL  Speech is spontaneous, normal rate, volume, rhythm but tangential and disorganized  Mood is \"OK\"  Affect: mood-congruent, full range  Perception: positive for continued delusions  Thought process: disorganized  Thought content: Denies SI/plan/intent, denies HI/plan/intent  Insight/judgment: poor  Cognition is grossly intact    Physical Exam:  Body habitus: Body mass index is 16.6 kg/m². Musculoskeletal system: normal gait  Tremor - neg  Cog wheeling - neg    Assessment and Plan:  Malu Benoit meets criteria for a diagnosis of:  Bipolar 1 disorder, with psychotic features    02/20/23  D/c amlodipine. Continue depakote and zyprexa  Depakote level tomorrow. Sw to contact family to assess return to baseline and possible d/c coordination thereafter. 2/19/23- Increasing HS Zyprexa to 10mg for ongoing psychosis, increasing AM dose to 5mg. Reviewed with pt, and she in agreement and aware of these increases. Continue rest of medications as prescribed. A coordinated, multidisplinary treatment team round was conducted with the patient, nurses, pharmcist,  and writer present. Discussions held with , and/or with family members; Complete current electronic health record for patient was reviewed in full including consultant notes, ancillary staff notes, nurses and tech notes, labs and vitals. I certify that this patients inpatient psychiatric hospital services furnished since the previous certification were, and continue to be, required for treatment that could reasonably be expected to improve the patient's condition, or for diagnostic study, and that the patient continues to need, on a daily basis, active treatment furnished directly by or requiring the supervision of inpatient psychiatric facility personnel. In addition, the hospital records show that services furnished were intensive treatment services, admission or related services, or equivalent services.

## 2023-02-20 NOTE — BH NOTES
GROUP THERAPY PROGRESS NOTE    Patient did not participate in Healthy Living and Wellness group.     PARK Kern, [facility]-A

## 2023-02-21 PROCEDURE — 74011250637 HC RX REV CODE- 250/637: Performed by: PSYCHIATRY & NEUROLOGY

## 2023-02-21 PROCEDURE — 65220000003 HC RM SEMIPRIVATE PSYCH

## 2023-02-21 PROCEDURE — 74011250637 HC RX REV CODE- 250/637: Performed by: NURSE PRACTITIONER

## 2023-02-21 RX ORDER — LEUCOVORIN CALCIUM 5 MG/1
5 TABLET ORAL
Status: DISCONTINUED | OUTPATIENT
Start: 2023-02-23 | End: 2023-02-22 | Stop reason: HOSPADM

## 2023-02-21 RX ORDER — METHOTREXATE 2.5 MG/1
2.5 TABLET ORAL
Status: DISCONTINUED | OUTPATIENT
Start: 2023-02-21 | End: 2023-02-21

## 2023-02-21 RX ORDER — METHOTREXATE 2.5 MG/1
15 TABLET ORAL
Status: DISCONTINUED | OUTPATIENT
Start: 2023-02-22 | End: 2023-02-22 | Stop reason: HOSPADM

## 2023-02-21 RX ORDER — LEUCOVORIN CALCIUM 5 MG/1
5 TABLET ORAL 2 TIMES WEEKLY
Status: DISCONTINUED | OUTPATIENT
Start: 2023-02-21 | End: 2023-02-21

## 2023-02-21 RX ADMIN — OLANZAPINE 10 MG: 5 TABLET, FILM COATED ORAL at 21:21

## 2023-02-21 RX ADMIN — FOLIC ACID 3 MG: 1 TABLET ORAL at 08:25

## 2023-02-21 RX ADMIN — LISINOPRIL 30 MG: 20 TABLET ORAL at 09:03

## 2023-02-21 RX ADMIN — OLANZAPINE 5 MG: 5 TABLET, FILM COATED ORAL at 08:25

## 2023-02-21 RX ADMIN — DIVALPROEX SODIUM 250 MG: 250 TABLET, DELAYED RELEASE ORAL at 08:25

## 2023-02-21 RX ADMIN — DIVALPROEX SODIUM 250 MG: 250 TABLET, DELAYED RELEASE ORAL at 17:31

## 2023-02-21 NOTE — BH NOTES
PSYCHIATRIC PROGRESS NOTE    Patient: Rehan Mirza MRN: [id number]  SSN: [id number]    YOB: 1955  Age: 78 y.o. Sex: female      Admit Date: 2/17/2023    Length of Stay: 4 Days    Chief Complaint:   \"I am feeling better. \"     Interval History:   02/21/2023  Nursing report patient is sleeping well, eating her meals and taking her medications well. Staff report that patient had some bizarre content about interconnection of minds. This morning she reports feeling much like herself. She feels that some things she is struggling to remember and is asking her trusted friends and her brother to help her reconstruct. Some times she is not trusting her memory. She gives the example of questioning her nurse this morning upon being given am meds. She says she doubted herself and spit the medications. However, she took them readily after checking with her nurse. Patient says that she has no suicidal thoughts or plans. 02/20/2023  Nursing report that patient slept 6 hours. She's been taking her medications. Patient reports that feels well rested, feels like her usual self, and sounds like her usual self. She feels that her thoughts aren't 'racing,' like before. No passive death wish. She denies SI. She denies experiencing halllucinations of any type. However there is still residual paranoid delusions. She asks if we met at [facility] playing trivia with her brother Kayla Sofia, but that is not actually true. 2/19/23- Malu appears slightly more manic today. She is still calm, but her thought process is disorganized. She is talking about writing things down that are going on in her \"alternate reality\" that can help therapists or other professionals that can help them understand what is going on inside her head. Her speech is of normal volume and rate, but tangential and disorganized.  She states she has been seeing people in her room, and she thought other people thought that she was the older woman who she saw in her room. She then states she thinks she looks good for her age, and she thinks she thinks she has taken a turn for the better. She is exhibiting loose associations, jumping from topic to topic, exhibiting tangentiality. She is having a difficult time organizing her thoughts. She is medication compliant, denies side effects, but she states she is getting migraines. She continues to exhibit perceptual disturbances, and recalls what she describes as nightmares, but what sounds more like a delusion. She states she saw her nephew, but then she realized that was a prior version of herself, here in the hospital. She endorses auditory hallucinations as well; she states she hears other people near her talking, and then she responds to them, but the feels it is an inappropriate response. She states in her nightmares, the term \"OCD\" is a trigger for her. Her behavior is disorganized; when asked for clarification about the OCD comment she just made, she pulled out a tissue and began to speak about something entirely unrelated. It appears she is confusing active hallucinations and delusions with memories and dreams. She denies SI/plan/intent and HI/plan/intent. She mentioned watching videos recently, but then she stated she has realized the videos are hallucinations. Reality testing is poor. She states she is unable to identify what is real and what is in her head. She states it is possible she and I have been working together for a very long time (first time meeting her was yesterday). Past Medical History:  No past medical history on file.       Labs:  Lab Results   Component Value Date/Time    WBC 4.9 02/14/2023 04:51 AM    HGB 12.8 02/14/2023 04:51 AM    HCT 38.8 02/14/2023 04:51 AM    PLATELET 110 83/26/5469 04:51 AM    MCV 98.7 02/14/2023 04:51 AM      Lab Results   Component Value Date/Time    Sodium 140 02/14/2023 04:51 AM    Potassium 3.8 02/14/2023 04:51 AM    Chloride 109 (H) 02/14/2023 04:51 AM    CO2 26 02/14/2023 04:51 AM    Anion gap 5 02/14/2023 04:51 AM    Glucose 113 (H) 02/14/2023 04:51 AM    BUN 17 02/14/2023 04:51 AM    Creatinine 0.71 02/14/2023 04:51 AM    BUN/Creatinine ratio 24 (H) 02/14/2023 04:51 AM    Calcium 9.7 02/14/2023 04:51 AM    Bilirubin, total 2.1 (H) 02/14/2023 04:51 AM    Alk.  phosphatase 54 02/14/2023 04:51 AM    Protein, total 7.4 02/14/2023 04:51 AM    Albumin 3.9 02/14/2023 04:51 AM    Globulin 3.5 02/14/2023 04:51 AM    A-G Ratio 1.1 02/14/2023 04:51 AM    ALT (SGPT) 35 02/14/2023 04:51 AM      Vitals:    02/20/23 0735 02/20/23 1614 02/21/23 0721 02/21/23 0903   BP: 93/63 102/69 105/64 113/72   Pulse: 81 72 66 67   Resp: 16 16 14    Temp: 97.5 °F (36.4 °C) 97.5 °F (36.4 °C) 97.6 °F (36.4 °C)    SpO2: 99% 99% 100%    Weight:           Current Facility-Administered Medications   Medication Dose Route Frequency Provider Last Rate Last Admin    OLANZapine (ZyPREXA) tablet 10 mg  10 mg Oral QHS Gustavo Esparza NP   10 mg at 02/20/23 2105    OLANZapine (ZyPREXA) tablet 5 mg  5 mg Oral DAILY Gustavo Esparza NP   5 mg at 02/21/23 0825    acetaminophen (TYLENOL) tablet 650 mg  650 mg Oral Q4H PRN Jarrett Love MD        Or    acetaminophen (TYLENOL) solution 650 mg  650 mg Per NG tube Q4H PRN Jarrett Love MD        Or    acetaminophen (TYLENOL) suppository 650 mg  650 mg Rectal Q4H PRN Jarrett Love MD        divalproex DR (DEPAKOTE) tablet 250 mg  250 mg Oral BID Jarrett Love MD   250 mg at 05/27/15 8014    folic acid (FOLVITE) tablet 3 mg  3 mg Oral DAILY Jarrett Love MD   3 mg at 02/21/23 0825    lisinopriL (PRINIVIL, ZESTRIL) tablet 30 mg  30 mg Oral DAILY Jarrett Love MD   30 mg at 02/21/23 0903    OLANZapine (ZyPREXA) 5 mg in sterile water (preservative free) 1 mL injection  5 mg IntraMUSCular Q4H PRN Jarrett Love MD        OLANZapine (ZyPREXA) tablet 2.5 mg  2.5 mg Oral Q6H PRN Raisa ADAME NP        haloperidol lactate (HALDOL) injection 2.5 mg  2.5 mg IntraMUSCular Q6H PRN Kamran Ferraro, DENIA        benztropine (COGENTIN) tablet 0.5 mg  0.5 mg Oral BID PRN Kamran Ferraro NP        diphenhydrAMINE (BENADRYL) injection 25 mg  25 mg IntraMUSCular BID PRN Hamlet Dixon, NP        magnesium hydroxide (MILK OF MAGNESIA) 400 mg/5 mL oral suspension 30 mL  30 mL Oral DAILY PRN Kristianlazarus Salazar Hamlet Thompson, DENIA         Mental Status Exam:  Bacilio Reyes is a 78 y.o. female who is well groomed, appears stated age, dressed in casual clothes. Eye contact is fair. Psychomotor activity is WNL  Speech is spontaneous, normal rate, volume, rhythm but tangential and disorganized  Mood is \"OK\"  Affect: mood-congruent, full range  Perception: positive for continued delusions  Thought process: disorganized  Thought content: Denies SI/plan/intent, denies HI/plan/intent  Insight/judgment: poor  Cognition is grossly intact    Physical Exam:  Body habitus: Body mass index is 16.6 kg/m². Musculoskeletal system: normal gait  Tremor - neg  Cog wheeling - neg    Assessment and Plan:  Malu Benoit meets criteria for a diagnosis of:  Bipolar 1 disorder, with psychotic features    02/21/2023  Will continue depakote and zyprexa  Will restart patient's methotrexate and leucovorin. Depakote level tomorrow. Discharge tomorrow. 02/20/23  D/c amlodipine. Continue depakote and zyprexa  Depakote level tomorrow. Sw to contact family to assess return to baseline and possible d/c coordination thereafter. 2/19/23- Increasing HS Zyprexa to 10mg for ongoing psychosis, increasing AM dose to 5mg. Reviewed with pt, and she in agreement and aware of these increases. Continue rest of medications as prescribed. A coordinated, multidisplinary treatment team round was conducted with the patient, nurses, pharmcist,  and writer present. Discussions held with , and/or with family members;  Complete current electronic health record for patient was reviewed in full including consultant notes, ancillary staff notes, nurses and tech notes, labs and vitals. I certify that this patients inpatient psychiatric hospital services furnished since the previous certification were, and continue to be, required for treatment that could reasonably be expected to improve the patient's condition, or for diagnostic study, and that the patient continues to need, on a daily basis, active treatment furnished directly by or requiring the supervision of inpatient psychiatric facility personnel. In addition, the hospital records show that services furnished were intensive treatment services, admission or related services, or equivalent services.

## 2023-02-21 NOTE — INTERDISCIPLINARY ROUNDS
Behavioral Health Interdisciplinary Rounds     Patient Name: Lesley Henderson  Age: 78 y.o. Room/Bed:  [facility]/  Primary Diagnosis: <principal problem not specified>   Admission Status: Involuntary Commitment     Readmission within 30 days: no  Power of  in place: no  Patient requires a blocked bed: no          Reason for blocked bed:   Sleep hours: 7+    Participation in Care/Groups:    Medication Compliant?: Yes  PRNS (last 24 hours): None    Restraints (last 24 hours):  no  __________________________________________________  OQ Admission Analysis Survey completed:yes  OQ Admission Analysis Survey score:68  OQ Discharge Analysis Survey completed:  OQ Discharge Analysis Survey score:   __________________________________________________     Alcohol screening (AUDIT) completed -  AUDIT Score: 4  If applicable, date SBIRT discussed in treatment team AND documented:    Tobacco - patient is a smoker: Have You Used Tobacco in the Past 30 Days: No  Illegal Drugs use: Have You Used Any Illegal Substances Over the Past 12 Months: No    24 hour chart check complete: yes     _______________________________________________    Patient goal(s) for today:   Treatment team focus/goals: Plan for discharge tomorrow   Progress note: She denies any issues with her medications.        Spiritual Care Consult:   Financial concerns/prescription coverage:  [employer]   Family contact:    she signed a YARI for her brother and sister in law                     Family requesting physician contact today:    Discharge plan: she will return home   Access to weapons :   no                                                           Outpatient provider(s): refer to [facility]   Patient's preferred phone number for follow up call :   Patient's preferred e-mail address :    LOS:  4  Expected LOS: Wednesday     Participating treatment team members: Froy Staley, PharmD. - Douglas Centeno Greg Tate

## 2023-02-21 NOTE — BH NOTES
Patient meds with due time of 1100 unable to give as they (Wellcovorin and Rheumatrex) have not been verified by pharmacy. Spoke with pharmacist, Mane Burks, and she is aware and working on.

## 2023-02-21 NOTE — PROGRESS NOTES
Spirituality Group      Meeting Topic: Linda    Meeting Time:  45-60 minutes    Meeting Goal: Patients will explore the experience of linda and develop plans to bring lasting linda into their lives. Meeting Outcome: Participants will engage in self-reflection, community building, and incorporating spiritual practices into their healing journey. Malu Benoit attended and participated in the group appropriately respective of current diagnosis. For additional spiritual care, please contact the  on-call at ([phone]). Rev.  Cathy Wells MDiv, MS, [facility]  Staff

## 2023-02-21 NOTE — PROGRESS NOTES
Problem: Falls - Risk of  Goal: *Absence of Falls  Description: Document Sharon Mend Fall Risk and appropriate interventions in the flowsheet.   Outcome: Progressing Towards Goal  Note: Fall Risk Interventions:          Gait steady  No voiced complaints or acute distress at present

## 2023-02-21 NOTE — PROGRESS NOTES
Patient resting in bed with eyes closed. No complaints or distress noted. Safety measures in place. Will continue to monitor q15 minutes. Problem: Falls - Risk of  Goal: *Absence of Falls  Description: Document Ady Borden Fall Risk and appropriate interventions in the flowsheet.   Outcome: Progressing Towards Goal  Note: Fall Risk Interventions:

## 2023-02-21 NOTE — BH NOTES
GROUP THERAPY PROGRESS NOTE    Patient is participating in psychotherapy group. Group time: 60 minutes    Personal goal for participation: to gain an understanding of boundaries in our relationships    Goal orientation: Personal    Group therapy participation: Active     Therapeutic interventions reviewed and discussed:  Group members were guided through learning about the importance of boundaries. Members gained an understanding of what boundaries are, how to set them, and the differences between healthy and unhealthy boundaries. Examples of situations were provided for the members to assess and provide the appropriate boundary. Handouts provided. Impression of participation:  Pts were present and engaged in group discussion. Pts added insight to group topic.  Pt interacted with peers and PARK Vidal, HP-A

## 2023-02-21 NOTE — BH NOTES
GROUP THERAPY PROGRESS NOTE    Patient did not participate in Coping Skills group.     Keyla NICK, QMHP-A

## 2023-02-21 NOTE — PROGRESS NOTES
Admission Medication Reconciliation:    Information obtained from:  communication with Vicente Andrew (phone number: [phone]), Insurance claims data, review of EMR, and [facility]  RxQuery data available¹:  YES    Comments/Recommendations: Updated PTA meds/reviewed patient's allergies. 1)  Pharmacist called [facility] to confirm the patient's medications. Methotrexate/leucovorin directions were clarified - methotrexate 15 mg weekly followed by leucovorin 5 mg daily x 3 days (starting the following day). The patient reported to the RN that she usually takes methotrexate on Wednesdays. She was not able to confirm the last date of administration but states that she does not ever go more than two weeks without taking it. 2)  The [place] Prescription Monitoring Program () was assessed to determine fill history of any controlled medications. The patient has filled the following controlled medications in the last 12 months. - 8/26/22: lorazepam 0.5 mg, #60 for 20 day supply    3)  Medication changes (since last review): Added  - none    Adjusted  - leucovorin 5 mg daily x 3 days, starting the day after methotrexate (from 2 times per week)  - methotrexate 15 mg (6 tabs) every Wednesday (from 2.5 mg two times a week)    Removed  - none   ¹RxQuery pharmacy benefit data reflects medications filled and processed through the patient's insurance, however this data does NOT capture whether the medication was picked up or is currently being taken by the patient. Allergies:  Codeine; Food supplemt, lactose-reduced; Fructose; and Milk containing products    Significant PMH/Disease States: No past medical history on file. Chief Complaint for this Admission:  No chief complaint on file. Prior to Admission Medications:   Prior to Admission Medications   Prescriptions Last Dose Informant Taking?   folic acid (FOLVITE) 1 mg tablet   Yes   Sig: Take 3 mg by mouth daily.    leucovorin calcium (WELLCOVORIN) 5 mg tablet Yes   Sig: Take 5 mg by mouth daily for 3 days after each methotrexate dose   lisinopriL (PRINIVIL, ZESTRIL) 30 mg tablet   Yes   Sig: Take 30 mg by mouth daily. methotrexate (RHEUMATREX) 2.5 mg tablet   Yes   Sig: Take 15 mg by mouth every Wednesday.       Facility-Administered Medications: None     Jazlyn Vicente, PHARMD

## 2023-02-21 NOTE — BH NOTES
GROUP THERAPY PROGRESS NOTE    Patient did not participate in recreational therapy group.     PARK Stanley, HP-A

## 2023-02-22 VITALS
RESPIRATION RATE: 16 BRPM | WEIGHT: 109.2 LBS | HEART RATE: 62 BPM | DIASTOLIC BLOOD PRESSURE: 61 MMHG | BODY MASS INDEX: 16.55 KG/M2 | HEIGHT: 68 IN | SYSTOLIC BLOOD PRESSURE: 112 MMHG | TEMPERATURE: 97.8 F | OXYGEN SATURATION: 100 %

## 2023-02-22 LAB — VALPROATE SERPL-MCNC: 50 UG/ML (ref 50–100)

## 2023-02-22 PROCEDURE — 74011250637 HC RX REV CODE- 250/637: Performed by: PSYCHIATRY & NEUROLOGY

## 2023-02-22 PROCEDURE — 74011250637 HC RX REV CODE- 250/637: Performed by: NURSE PRACTITIONER

## 2023-02-22 PROCEDURE — 80164 ASSAY DIPROPYLACETIC ACD TOT: CPT

## 2023-02-22 PROCEDURE — 36415 COLL VENOUS BLD VENIPUNCTURE: CPT

## 2023-02-22 PROCEDURE — 74011250636 HC RX REV CODE- 250/636: Performed by: PSYCHIATRY & NEUROLOGY

## 2023-02-22 RX ORDER — OLANZAPINE 5 MG/1
TABLET ORAL
Qty: 90 TABLET | Refills: 0 | Status: SHIPPED | OUTPATIENT
Start: 2023-02-22 | End: 2023-03-24

## 2023-02-22 RX ORDER — DIVALPROEX SODIUM 250 MG/1
250 TABLET, DELAYED RELEASE ORAL 2 TIMES DAILY
Qty: 60 TABLET | Refills: 0 | Status: SHIPPED | OUTPATIENT
Start: 2023-02-22 | End: 2023-03-24

## 2023-02-22 RX ADMIN — LISINOPRIL 30 MG: 20 TABLET ORAL at 09:16

## 2023-02-22 RX ADMIN — METHOTREXATE 15 MG: 2.5 TABLET ORAL at 09:12

## 2023-02-22 RX ADMIN — DIVALPROEX SODIUM 250 MG: 250 TABLET, DELAYED RELEASE ORAL at 09:15

## 2023-02-22 RX ADMIN — FOLIC ACID 3 MG: 1 TABLET ORAL at 09:15

## 2023-02-22 RX ADMIN — OLANZAPINE 5 MG: 5 TABLET, FILM COATED ORAL at 09:15

## 2023-02-22 NOTE — BH NOTES
Behavioral Health Transition Record to Provider    Patient Name: Cristy Quinn  YOB: 1955  Medical Record Number: [id number]  Date of Admission: 2/17/2023  Date of Discharge: 2/22/23    Attending Provider: Melissa Be MD  Discharging Provider: Dr. Davon Rogers  To contact this individual call [phone] and ask the  to page. If unavailable, ask to be transferred to [address] [place] [address] Provider on call. [facility] Provider will be available on call 24/7 and during holidays. Primary Care Provider: None    Allergies   Allergen Reactions    Codeine Vertigo    Food Supplemt, Lactose-Reduced Diarrhea    Fructose Diarrhea    Milk Containing Products Other (comments)       Reason for Admission: Cristy Quinn is a 78 y.o. WHITE/NON- female who is currently admitted to the psychiatric floor at [facility] on a voluntary basis for acute amanda. Per family's report, this is the first episode of amanda; she has no hx of past inpatient psychiatric treatment. She has been evaluated for medical causes of mental status change, which have all been ruled out. She had not slept at all in 3 days and barely ate over that time frame as well prior to admission. Upon my evaluation, the patient was calm and cooperative. She describes the initial reason for admission was severe panic attacks. She states she passed out in the hospital while some tests were done, and then she does not have much memory of what has been happening the past couple days. She states she remembers bits and pieces. Currently, she is aware she is at [facility]. She is alert time time, place, person, and events. She was able to identify the date. She states she does have memories of the events that led up to her admission. She states she thinks she had a panic attack.  She states she accidentally locked herself out of the house when it was freezing rain, the day before she was admitted, and she had only a light jacket and no shoes on. When she finally got inside, the panic attack started. The same day, she states there was a squirrel running around inside her house, which was contributing to anxiety and panic. Her brother and his wife took her to [facility], where she was evaluated. When she got out of a CT, she fell and hit her head, and there was concern that she had had a concussion, which led to an admission. She had been having what she believes are drug-induced nightmares. She went home, and the next thing she knew, her brother told her she needed to go back to the hospital, because it seemed she wasn't acting like herself. She does not remember specifically what the behavior changes were; in her mind, she was acting this way because she felt it was all connected to the squirrel in her house the day before. She remembers become angry when she felt people didn't listen to her. She was having very delusions about armageddon and the end of the world - she describes them as nightmares, but she acknowledges that in the moment she very much believed they were real.   She states her mood is currently \"good. \" She states she is comfortable, and is feeling much better than she felt yesterday, when she felt that there was a lot of \"very surreal mental imaging\" going on that she did not understand. She is not experiencing any \"mental imaging\" currently, and states she is not currently experiencing hallucinations. She is able to accurately recount the events of this morning. She denies SI/plan/intent and HI/plan/intent.      Admission Diagnosis: Bipolar disorder, unspecified ([facility] [F31.9]  Severe bipolar affective disorder with psychosis ([facility] [F31.89]    * No surgery found *    Results for orders placed or performed during the hospital encounter of 02/17/23   COVID-19 WITH INFLUENZA A/B   Result Value Ref Range    SARS-CoV-2 by PCR Not detected NOTD      Influenza A by PCR Not detected NOTD      Influenza B by PCR Not detected NOTD     SARS-COV-2   Result Value Ref Range    SARS-CoV-2 by PCR Please find results under separate order     VALPROIC ACID   Result Value Ref Range    Valproic acid 50 50 - 100 ug/ml       Immunizations administered during this encounter: There is no immunization history on file for this patient. Screening for Metabolic Disorders for Patients on Antipsychotic Medications  (Data obtained from the EMR)    Estimated Body Mass Index  Estimated body mass index is 16.6 kg/m² as calculated from the following:    Height as of this encounter: 5' 8\" (1.727 m). Weight as of this encounter: 49.5 kg (109 lb 3.2 oz). Vital Signs/Blood Pressure  Visit Vitals  /61 (BP 1 Location: Right upper arm, BP Patient Position: Sitting)   Pulse 62   Temp 97.8 °F (36.6 °C)   Resp 16   Ht 5' 8\" (1.727 m)   Wt 49.5 kg (109 lb 3.2 oz)   SpO2 100%   BMI 16.60 kg/m²       Blood Glucose/Hemoglobin A1c  Lab Results   Component Value Date/Time    Glucose 113 (H) 02/14/2023 04:51 AM    Glucose (POC) 122 (H) 02/12/2023 08:17 PM    Glucose (POC) 130 (H) 02/12/2023 08:00 PM       Lab Results   Component Value Date/Time    Hemoglobin A1c 5.4 02/14/2023 04:51 AM        Lipid Panel  Lab Results   Component Value Date/Time    Cholesterol, total 171 02/14/2023 04:51 AM    HDL Cholesterol 87 02/14/2023 04:51 AM    LDL, calculated 76.2 02/14/2023 04:51 AM    Triglyceride 39 02/14/2023 04:51 AM    CHOL/HDL Ratio 2.0 02/14/2023 04:51 AM        Discharge Diagnosis: Bipolar 1 disorder, with psychotic features    Discharge Plan: The patient Malu Benoit exhibits the ability to control behavior in a less restrictive environment. Patient's level of functioning is improving. No assaultive/destructive behavior has been observed for the past 24 hours. No suicidal/homicidal threat or behavior has been observed for the past 24 hours. There is no evidence of serious medication side effects.   Patient has not been in physical or protective restraints for at least the past 24 hours. If weapons involved, how are they secured? No weapons involved     Is patient aware of and in agreement with discharge plan? She is aware of discharge and is in agreement     Arrangements for medication:  Prescriptions sent to your pharmacy     Copy of discharge instructions to provider?:  yes, fax to [address] for transportation home:  family to      Keep all follow up appointments as scheduled, continue to take prescribed medications per physician instructions. Mental health crisis number:  039 or your local mental health crisis line number at [address] Emergency WARM LINE      6-707-436-MHAV ([phone])      M-F: 9am to 9pm      Sat & Sun: 5pm - 9pm  National suicide prevention lines:                             4-009-TWPHDYQ (3-579-313-916-344-9528)       7-945-944-TALK (3-827-361-751-111-3309)   24/7 Crisis Text Line:  Text HOME to 140488       Discharge Medication List and Instructions:   Current Discharge Medication List        START taking these medications    Details   divalproex DR (DEPAKOTE) 250 mg tablet Take 1 Tablet by mouth two (2) times a day for 30 days. Indications: bipolar d/o  Qty: 60 Tablet, Refills: 0  Start date: 2/22/2023, End date: 3/24/2023      OLANZapine (ZyPREXA) 5 mg tablet Take 1 Tablet by mouth daily AND 2 Tablets nightly. Do all this for 30 days. Indications: bp d/o, unspecified. Qty: 90 Tablet, Refills: 0  Start date: 2/22/2023, End date: 3/24/2023           CONTINUE these medications which have NOT CHANGED    Details   lisinopriL (PRINIVIL, ZESTRIL) 30 mg tablet Take 30 mg by mouth daily. leucovorin calcium (WELLCOVORIN) 5 mg tablet Take 5 mg by mouth daily for 3 days after each methotrexate dose      methotrexate (RHEUMATREX) 2.5 mg tablet Take 15 mg by mouth every Wednesday. folic acid (FOLVITE) 1 mg tablet Take 3 mg by mouth daily.              Unresulted Labs (24h ago, onward)      None          To obtain results of studies pending at discharge, please contact [phone]    Follow-up Information       Follow up With Specialties Details Why Contact Info    [facility]  Follow up on 2/28/2023 you have a 9:15 virtual appointment with Dr. Tatiana Pandya [address] [place] [id number] [phone]    None    None ([phone]) Patient stated that they have no PCP              Advanced Directive:   Does the patient have an appointed surrogate decision maker? No  Does the patient have a Medical Advance Directive? No  Does the patient have a Psychiatric Advance Directive? No  If the patient does not have a surrogate or Medical Advance Directive AND Psychiatric Advance Directive, the patient was offered information on these advance directives Patient declined to complete    Patient Instructions: Please continue all medications until otherwise directed by physician. Tobacco Cessation Discharge Plan:   Is the patient a smoker and needs referral for smoking cessation? Not applicable  Patient referred to the following for smoking cessation with an appointment? Not applicable     Patient was offered medication to assist with smoking cessation at discharge? Not applicable  Was education for smoking cessation added to the discharge instructions? Not applicable    Alcohol/Substance Abuse Discharge Plan:   Does the patient have a history of substance/alcohol abuse and requires a referral for treatment? Not applicable  Patient referred to the following for substance/alcohol abuse treatment with an appointment? Not applicable  Patient was offered medication to assist with alcohol cessation at discharge? Not applicable  Was education for substance/alcohol abuse added to discharge instructions? Not applicable    Patient discharged to Home; provided to the patient/caregiver either in hard copy or electronically.

## 2023-02-22 NOTE — DISCHARGE INSTRUCTIONS
DISCHARGE SUMMARY    NAME:Malu Benoit  : 1955  MRN: [id number]    The patient Malu Benoit exhibits the ability to control behavior in a less restrictive environment. Patient's level of functioning is improving. No assaultive/destructive behavior has been observed for the past 24 hours. No suicidal/homicidal threat or behavior has been observed for the past 24 hours. There is no evidence of serious medication side effects. Patient has not been in physical or protective restraints for at least the past 24 hours. If weapons involved, how are they secured? No weapons involved     Is patient aware of and in agreement with discharge plan? She is aware of discharge and is in agreement     Arrangements for medication:  Prescriptions sent to your pharmacy     Copy of discharge instructions to provider?:  yes, fax to [address] [place] [address],[facility] for transportation home:  family to      Keep all follow up appointments as scheduled, continue to take prescribed medications per physician instructions. Mental health crisis number:  003 or your local mental health crisis line number at [facility] [facility] WARM LINE      6-500-123-MHAV (3297)      M-F: 9am to 9pm      Sat & Sun: [facility] suicide prevention lines:                             0-508-CJLVIXM ([phone])       8-498-068-TALK ([phone])    Crisis Text Line:  Text HOME to [place] from Nurse    PATIENT INSTRUCTIONS:      What to do at Home:  Recommended activity: Activity as tolerated,         *  Please give a list of your current medications to your Primary Care Provider. *  Please update this list whenever your medications are discontinued, doses are      changed, or new medications (including over-the-counter products) are added. *  Please carry medication information at all times in case of emergency situations.     These are general instructions for a healthy lifestyle:    No smoking/ No tobacco products/ Avoid exposure to second hand smoke  Surgeon General's Warning:  Quitting smoking now greatly reduces serious risk to your health. Obesity, smoking, and sedentary lifestyle greatly increases your risk for illness    A healthy diet, regular physical exercise & weight monitoring are important for maintaining a healthy lifestyle    You may be retaining fluid if you have a history of heart failure or if you experience any of the following symptoms:  Weight gain of 3 pounds or more overnight or 5 pounds in a week, increased swelling in our hands or feet or shortness of breath while lying flat in bed. Please call your doctor as soon as you notice any of these symptoms; do not wait until your next office visit. The discharge information has been reviewed with the patient. The patient verbalized understanding. Discharge medications reviewed with the patient and appropriate educational materials and side effects teaching were provided.   ___________________________________________________________________________________________________________________________________

## 2023-02-22 NOTE — PROGRESS NOTES
Pharmacist Discharge Medication Reconciliation    Discharging Provider: Dr. Darcy Leary PMH: No past medical history on file. Chief Complaint for this Admission: No chief complaint on file. Allergies: Codeine; Food supplemt, lactose-reduced; Fructose; and Milk containing products    Discharge Medications:   Current Discharge Medication List        START taking these medications    Details   divalproex DR (DEPAKOTE) 250 mg tablet Take 1 Tablet by mouth two (2) times a day for 30 days. Indications: bipolar d/o  Qty: 60 Tablet, Refills: 0  Start date: 2/22/2023, End date: 3/24/2023      OLANZapine (ZyPREXA) 5 mg tablet Take 1 Tablet by mouth daily AND 2 Tablets nightly. Do all this for 30 days. Indications: bp d/o, unspecified. Qty: 90 Tablet, Refills: 0  Start date: 2/22/2023, End date: 3/24/2023           CONTINUE these medications which have NOT CHANGED    Details   lisinopriL (PRINIVIL, ZESTRIL) 30 mg tablet Take 30 mg by mouth daily. leucovorin calcium (WELLCOVORIN) 5 mg tablet Take 5 mg by mouth daily for 3 days after each methotrexate dose      methotrexate (RHEUMATREX) 2.5 mg tablet Take 15 mg by mouth every Wednesday. folic acid (FOLVITE) 1 mg tablet Take 3 mg by mouth daily.            The patient's chart, MAR and AVS were reviewed by Tamie Mullen, SHEAD.

## 2023-02-22 NOTE — PROGRESS NOTES
Laboratory Monitoring for Divalproex/Valproic Acid: This patient is currently prescribed the following medication(s):   Current Facility-Administered Medications   Medication Dose Route Frequency    methotrexate (RHEUMATREX) tablet 15 mg  15 mg Oral every Wednesday    [START ON 2/23/2023] leucovorin calcium (WELLCOVORIN) tablet 5 mg  5 mg Oral Once per day on Thu Fri Sat    OLANZapine (ZyPREXA) tablet 10 mg  10 mg Oral QHS    OLANZapine (ZyPREXA) tablet 5 mg  5 mg Oral DAILY    divalproex DR (DEPAKOTE) tablet 250 mg  250 mg Oral BID    folic acid (FOLVITE) tablet 3 mg  3 mg Oral DAILY    lisinopriL (PRINIVIL, ZESTRIL) tablet 30 mg  30 mg Oral DAILY     The following labs have been completed for monitoring of valproic acid:    Valproic Acid Serum Concentration  Lab Results   Component Value Date/Time    Valproic acid 50 02/22/2023 06:38 AM       Hepatic Function  Lab Results   Component Value Date/Time    Bilirubin, total 2.1 (H) 02/14/2023 04:51 AM    Protein, total 7.4 02/14/2023 04:51 AM    Albumin 3.9 02/14/2023 04:51 AM    Globulin 3.5 02/14/2023 04:51 AM    A-G Ratio 1.1 02/14/2023 04:51 AM    ALT (SGPT) 35 02/14/2023 04:51 AM    AST (SGOT) 26 02/14/2023 04:51 AM    Alk. phosphatase 54 02/14/2023 04:51 AM     Hematology  Lab Results   Component Value Date/Time    WBC 4.9 02/14/2023 04:51 AM    RBC 3.93 02/14/2023 04:51 AM    HGB 12.8 02/14/2023 04:51 AM    HCT 38.8 02/14/2023 04:51 AM    MCV 98.7 02/14/2023 04:51 AM    MCH 32.6 02/14/2023 04:51 AM    MCHC 33.0 02/14/2023 04:51 AM    RDW 13.4 02/14/2023 04:51 AM    PLATELET 566 96/49/2161 04:51 AM     Assessment/Plan:  A valproic acid level was drawn on 2/22 @ 0638 and found to be 50 mcg/mL. This is reflective of a steady state trough level and is considered therapeutic (low end). Based on this level, recommend continuing the current dose. There is room to further titrate dose upon discharge, if needed.      Zohaib Snell, PharmD, BCPP, BCPS  Clinical Pharmacy Specialist, [address]

## 2023-02-22 NOTE — PROGRESS NOTES
Problem: Falls - Risk of  Goal: *Absence of Falls  Description: Document Aung Sands Fall Risk and appropriate interventions in the flowsheet. Outcome: Progressing Towards Goal  Note: Fall Risk Interventions: Teach Pt to arise slowly from seated position          Pt received resting in bed with eyes closed. Breathing is even and unlabored. Walkways are free and clear from clutter.

## 2023-02-22 NOTE — INTERDISCIPLINARY ROUNDS
Behavioral Health Interdisciplinary Rounds     Patient Name: Truman Jarrett  Age: 78 y.o.   Room/Bed:  [facility]/  Primary Diagnosis: <principal problem not specified>   Admission Status: Involuntary Commitment     Readmission within 30 days: no  Power of  in place: no  Patient requires a blocked bed: no          Reason for blocked bed:   Sleep hours: 6+       Participation in Care/Groups:  yes  Medication Compliant?: Yes  PRNS (last 24 hours): None    Restraints (last 24 hours):  no  __________________________________________________  OQ Admission Analysis Survey completed:  OQ Admission Analysis Survey score:  OQ Discharge Analysis Survey completed:  OQ Discharge Analysis Survey score:   __________________________________________________     Alcohol screening (AUDIT) completed -  AUDIT Score: 4  If applicable, date SBIRT discussed in treatment team AND documented:    Tobacco - patient is a smoker: Have You Used Tobacco in the Past 30 Days: No  Illegal Drugs use: Have You Used Any Illegal Substances Over the Past 12 Months: No    24 hour chart check complete: yes     _______________________________________________    Patient goal(s) for today:   Treatment team focus/goals:   Progress note:      Spiritual Care Consult:   Financial concerns/prescription coverage:    Family contact:                        Family requesting physician contact today:    Discharge plan:   Access to weapons :                                                              Outpatient provider(s):   Patient's preferred phone number for follow up call :   Patient's preferred e-mail address :    LOS:  5  Expected LOS:     Participating treatment team members: Malu Benoit, * (assigned SW),

## 2023-02-22 NOTE — DISCHARGE SUMMARY
DISCHARGE SUMMARY    Some parts of the discharge summary are from the initial Psychiatric interview that was done on admission by the admitting psychiatrist.     Date of Admission: 2023    Date of Discharge: 2023     TYPE OF DISCHARGE:   Involuntary commitment. Discharge. ADMISSION EVALUATION:  [facility]:     Name: Kamran Ventrua  MR#: [id number]  ACCOUNT#: [de-identified]  : 1955  ADMIT DATE: 2023     CHIEF COMPLAINT: \"I've been having anxiety attacks that have been getting pretty severe. \"      HISTORY OF PRESENTING COMPLAINT:  Malu Benoit is a 78 y.o. WHITE/NON- female who is currently admitted to the psychiatric floor at [facility] on a voluntary basis for acute amanda. Per family's report, this is the first episode of amanda; she has no hx of past inpatient psychiatric treatment. She has been evaluated for medical causes of mental status change, which have all been ruled out. She had not slept at all in 3 days and barely ate over that time frame as well prior to admission. Upon my evaluation, the patient was calm and cooperative. She describes the initial reason for admission was severe panic attacks. She states she passed out in the hospital while some tests were done, and then she does not have much memory of what has been happening the past couple days. She states she remembers bits and pieces. Currently, she is aware she is at [facility]. She is alert time time, place, person, and events. She was able to identify the date. She states she does have memories of the events that led up to her admission. She states she thinks she had a panic attack. She states she accidentally locked herself out of the house when it was freezing rain, the day before she was admitted, and she had only a light jacket and no shoes on. When she finally got inside, the panic attack started.  The same day, she states there was a squirrel running around inside her house, which was contributing to anxiety and panic. Her brother and his wife took her to [facility], where she was evaluated. When she got out of a CT, she fell and hit her head, and there was concern that she had had a concussion, which led to an admission. She had been having what she believes are drug-induced nightmares. She went home, and the next thing she knew, her brother told her she needed to go back to the hospital, because it seemed she wasn't acting like herself. She does not remember specifically what the behavior changes were; in her mind, she was acting this way because she felt it was all connected to the squirrel in her house the day before. She remembers become angry when she felt people didn't listen to her. She was having very delusions about armageddon and the end of the world - she describes them as nightmares, but she acknowledges that in the moment she very much believed they were real.   She states her mood is currently \"good. \" She states she is comfortable, and is feeling much better than she felt yesterday, when she felt that there was a lot of \"very surreal mental imaging\" going on that she did not understand. She is not experiencing any \"mental imaging\" currently, and states she is not currently experiencing hallucinations. She is able to accurately recount the events of this morning. She denies SI/plan/intent and HI/plan/intent. PAST PSYCHIATRIC HISTORY: Hx of anxiety. No hx of inpatient psychiatric admissions, no hx of suicide attempts. PCP is Dr. Don Moraes. She does not have a psychiatrist. She is not taking psychiatric medications, and can remember taking Ativan and Valium in the past. She does have a therapist, who she saw around August or September, but she did not continue with her after 5-6 sessions, for anxiety, which she found very helpful. SUBSTANCE ABUSE HISTORY: None; UDS negative.  She drinks about a half a glass of wine, sometimes a full glass, 2-3 times a week. No hx of past substance abuse. No hx of tobacco or vaping. PSYCHOSOCIAL HISTORY: She is  for 30 years. She has no children. Her biggest social support is her best friend, Mark Narvaez, who lives in her neighborhood, as well as her family; she feels she has a wide support network. She works doing consulting part time. No legal hx. Highest level of education is Master's degree. FAMILY HISTORY: Brother - had a psychiatric admission in college, pt is unsure what the reason was, has since resolved. No other known family mental health history. PAST MEDICAL HISTORY: Reviewed per H&P. Hx of HTN, Meniere's disease, migraines, Raynaud's syndrome, and hx of melasma. ALLERGIES: Codeine, lactose, fructose     MENTAL STATUS EXAM: The patient was noted to be a casually dressed, 78 y.o. WHITE/NON- female who is in good grooming. The patient was pleasant, cooperative, and affectively stable. Pt was not dysphoric or depressed. Pt denied thoughts of suicide or thoughts of harming others. There was no agitation or lability noted. Speech was of normal rate, volume, and rhythm. Similarly, there was no evidence of any amanda or hypomania or any symptoms of psychosis, such as hallucinations, delusional thinking, etc. Thought process was linear and organized. Cognitively, the patient showed no signs of any deficits in word-finding, processing speed, or executive functioning, although she does demonstrate impaired short term memory as she cannot recall the events of the last two days. Judgment and insight are noted to be good. DIAGNOSTIC IMPRESSION:   Bipolar 1 disorder, with psychotic features     PLAN:   Continue inpatient stay for the safety and optimum care of the patient. Routine labs to be ordered as needed. Psychotropic medications will be ordered and adjusted as needed. Will continue current medications as ordered currently.  At time of assessment, manic episode appears to be resolved - will continue to monitor for a few more days. Supportive, milieu and group therapy. Estimated length of stay is 5-7 days, dependent upon pt's participation in treatment, response to pharmacological and non-pharmacological interventions, and follow-up plan including outpatient providers and safe environment for discharge. COURSE IN THE HOSPITAL:  Patient was admitted to the inpatient psychiatry unit for acute psychiatric stabilization in regards to symptomatology as described in the HPI above and placed on Q15 minute checks and withdrawal precautions. Patient was started on depakote and zyprexa combination and responded well. She was quite on the unit, appropriate in her interactions, and cooperative with medications and the unit routine. Please see individual progress notes for more specific details regarding patient's hospitalization course. Patient was discharged as per the plan. She had been doing well on the unit as per the report of the nursing staff and my observations. No PRN medication for agitation, seclusion or restraints were required during the last 48 hours of her stay. Malu Benoit had improved progressively to the point of being stable for discharge and outpatient FU. At this time she did not offer any complaints. Patient denied any SI or HI. Denied any AH or VH. She denied any delusions. Was not considered a danger to self or to others and is safe for discharge. Will FU with her appointments and remains motivated to be in treatment. The patient verbalized understanding of her discharge instructions. DISCHARGE DIAGNOSIS:        Current Discharge Medication List        START taking these medications    Details   divalproex DR (DEPAKOTE) 250 mg tablet Take 1 Tablet by mouth two (2) times a day for 30 days.  Indications: bipolar d/o  Qty: 60 Tablet, Refills: 0  Start date: 2/22/2023, End date: 3/24/2023      OLANZapine (ZyPREXA) 5 mg tablet Take 1 Tablet by mouth daily AND 2 Tablets nightly. Do all this for 30 days. Indications: bp d/o, unspecified. Qty: 90 Tablet, Refills: 0  Start date: 2/22/2023, End date: 3/24/2023           CONTINUE these medications which have NOT CHANGED    Details   lisinopriL (PRINIVIL, ZESTRIL) 30 mg tablet Take 30 mg by mouth daily. leucovorin calcium (WELLCOVORIN) 5 mg tablet Take 5 mg by mouth daily for 3 days after each methotrexate dose      methotrexate (RHEUMATREX) 2.5 mg tablet Take 15 mg by mouth every Wednesday. folic acid (FOLVITE) 1 mg tablet Take 3 mg by mouth daily. Lab Results   Component Value Date/Time    Valproic acid 50 02/22/2023 06:38 AM     No results found for: LITH    Follow-up Information       Follow up With Specialties Details Why Contact Info    [facility]  Follow up on 2/28/2023 you have a 9:15 virtual appointment with Dr. Piña [place] [address]  [phone]    None    None ([phone]) Patient stated that they have no PCP            WOUND CARE: none needed. PROGNOSIS:   Good / Fair based on nature of patient's pathology/ies and treatment compliance issues. Prognosis is greatly dependent upon patient's ability to  follow up on psychiatric/psychotherapy appointments as well as to comply with psychiatric medications as prescribed.

## 2023-02-22 NOTE — BH NOTES
Patient is discharged, given instructions to Brooks,gone over with opportunity for questions to be answered and clarified. Medications to be picked up at [facility] by patient and Papo Castillo. Personal belongings returned to patient.

## 2023-02-22 NOTE — PROGRESS NOTES
Problem: Altered Thought Process (Adult/Pediatric)  Goal: *STG: Participates in treatment plan  Outcome: Progressing Towards Goal  Note: Out on unit working on puzzle w peers. Bright affect, mood stable, demonstrates ability to interact w peers, follow staff direction and unit routine. Able to get her needs met. Deneis SI, thought organization continue to improve. Slightl loose associations noted. Verbalized understanding of d/c plans to home. Reports sleep improved and adequate at 7 hours.  Staff focus is on offering support   Goal: *STG: Attends activities and groups  Outcome: Progressing Towards Goal  Goal: *STG: Demonstrates ability to understand and use improved judgment in daily activities and relationships  Outcome: Progressing Towards Goal  Goal: Interventions  Outcome: Progressing Towards Goal

## 2023-04-27 RX ORDER — LISINOPRIL 30 MG/1
30 TABLET ORAL DAILY
Status: ON HOLD | COMMUNITY
End: 2023-06-16 | Stop reason: SDUPTHER

## 2023-04-27 RX ORDER — LEUCOVORIN CALCIUM 5 MG/1
TABLET ORAL
Status: ON HOLD | COMMUNITY
End: 2023-06-16 | Stop reason: HOSPADM

## 2023-04-27 RX ORDER — FOLIC ACID 1 MG/1
3 TABLET ORAL DAILY
Status: ON HOLD | COMMUNITY

## 2023-05-06 ENCOUNTER — HOSPITAL ENCOUNTER (INPATIENT)
Facility: HOSPITAL | Age: 68
LOS: 12 days | Discharge: HOME | DRG: 885 | End: 2023-05-18
Attending: EMERGENCY MEDICINE | Admitting: PSYCHIATRY & NEUROLOGY
Payer: MEDICARE

## 2023-05-06 DIAGNOSIS — F31.89 SEVERE BIPOLAR AFFECTIVE DISORDER WITH PSYCHOSIS (HCC): Primary | ICD-10-CM

## 2023-05-06 DIAGNOSIS — F32.A DEPRESSION, UNSPECIFIED DEPRESSION TYPE: ICD-10-CM

## 2023-05-06 LAB
ALBUMIN SERPL-MCNC: 4 G/DL (ref 3.5–5)
ALBUMIN/GLOB SERPL: 1.3 (ref 1.1–2.2)
ALP SERPL-CCNC: 47 U/L (ref 45–117)
ALT SERPL-CCNC: 34 U/L (ref 12–78)
AMPHET UR QL SCN: NEGATIVE
ANION GAP SERPL CALC-SCNC: 2 MMOL/L (ref 5–15)
APAP SERPL-MCNC: <2 UG/ML (ref 10–30)
APPEARANCE UR: ABNORMAL
AST SERPL-CCNC: 16 U/L (ref 15–37)
BARBITURATES UR QL SCN: NEGATIVE
BENZODIAZ UR QL: NEGATIVE
BILIRUB SERPL-MCNC: 2.6 MG/DL (ref 0.2–1)
BILIRUB UR QL: NEGATIVE
BUN SERPL-MCNC: 16 MG/DL (ref 6–20)
BUN/CREAT SERPL: 18 (ref 12–20)
CALCIUM SERPL-MCNC: 8.9 MG/DL (ref 8.5–10.1)
CANNABINOIDS UR QL SCN: NEGATIVE
CHLORIDE SERPL-SCNC: 106 MMOL/L (ref 97–108)
CO2 SERPL-SCNC: 32 MMOL/L (ref 21–32)
COCAINE UR QL SCN: NEGATIVE
COLOR UR: ABNORMAL
CREAT SERPL-MCNC: 0.89 MG/DL (ref 0.55–1.02)
ERYTHROCYTE [DISTWIDTH] IN BLOOD BY AUTOMATED COUNT: 12.9 % (ref 11.5–14.5)
ETHANOL SERPL-MCNC: <10 MG/DL (ref 0–0.08)
FLUAV RNA SPEC QL NAA+PROBE: NOT DETECTED
FLUBV RNA SPEC QL NAA+PROBE: NOT DETECTED
GLOBULIN SER CALC-MCNC: 3.2 G/DL (ref 2–4)
GLUCOSE SERPL-MCNC: 153 MG/DL (ref 65–100)
GLUCOSE UR STRIP.AUTO-MCNC: 100 MG/DL
HCT VFR BLD AUTO: 38.5 % (ref 35–47)
HGB BLD-MCNC: 12.5 G/DL (ref 11.5–16)
HGB UR QL STRIP: NEGATIVE
KETONES UR QL STRIP.AUTO: NEGATIVE MG/DL
LEUKOCYTE ESTERASE UR QL STRIP.AUTO: NEGATIVE
Lab: NORMAL
MCH RBC QN AUTO: 32.9 PG (ref 26–34)
MCHC RBC AUTO-ENTMCNC: 32.5 G/DL (ref 30–36.5)
MCV RBC AUTO: 101.3 FL (ref 80–99)
METHADONE UR QL: NEGATIVE
NITRITE UR QL STRIP.AUTO: NEGATIVE
NRBC # BLD: 0 K/UL (ref 0–0.01)
NRBC BLD-RTO: 0 PER 100 WBC
OPIATES UR QL: NEGATIVE
PCP UR QL: NEGATIVE
PH UR STRIP: 7.5 (ref 5–8)
PLATELET # BLD AUTO: 229 K/UL (ref 150–400)
PMV BLD AUTO: 10.8 FL (ref 8.9–12.9)
POTASSIUM SERPL-SCNC: 3.2 MMOL/L (ref 3.5–5.1)
PROT SERPL-MCNC: 7.2 G/DL (ref 6.4–8.2)
PROT UR STRIP-MCNC: NEGATIVE MG/DL
RBC # BLD AUTO: 3.8 M/UL (ref 3.8–5.2)
SALICYLATES SERPL-MCNC: <1.7 MG/DL (ref 2.8–20)
SARS-COV-2 RNA RESP QL NAA+PROBE: NOT DETECTED
SODIUM SERPL-SCNC: 140 MMOL/L (ref 136–145)
SP GR UR REFRACTOMETRY: 1.01 (ref 1–1.03)
SPECIMEN HOLD: NORMAL
UROBILINOGEN UR QL STRIP.AUTO: 0.2 EU/DL (ref 0.2–1)
WBC # BLD AUTO: 5.2 K/UL (ref 3.6–11)

## 2023-05-06 PROCEDURE — 36415 COLL VENOUS BLD VENIPUNCTURE: CPT

## 2023-05-06 PROCEDURE — 80179 DRUG ASSAY SALICYLATE: CPT

## 2023-05-06 PROCEDURE — 87636 SARSCOV2 & INF A&B AMP PRB: CPT

## 2023-05-06 PROCEDURE — 90791 PSYCH DIAGNOSTIC EVALUATION: CPT

## 2023-05-06 PROCEDURE — 80143 DRUG ASSAY ACETAMINOPHEN: CPT

## 2023-05-06 PROCEDURE — 81002 URINALYSIS NONAUTO W/O SCOPE: CPT

## 2023-05-06 PROCEDURE — 80053 COMPREHEN METABOLIC PANEL: CPT

## 2023-05-06 PROCEDURE — 85027 COMPLETE CBC AUTOMATED: CPT

## 2023-05-06 PROCEDURE — 1240000000 HC EMOTIONAL WELLNESS R&B

## 2023-05-06 PROCEDURE — 99285 EMERGENCY DEPT VISIT HI MDM: CPT

## 2023-05-06 PROCEDURE — 80307 DRUG TEST PRSMV CHEM ANLYZR: CPT

## 2023-05-06 PROCEDURE — 82077 ASSAY SPEC XCP UR&BREATH IA: CPT

## 2023-05-06 RX ORDER — HYDROXYZINE HYDROCHLORIDE 10 MG/1
10 TABLET, FILM COATED ORAL 3 TIMES DAILY PRN
Status: DISCONTINUED | OUTPATIENT
Start: 2023-05-06 | End: 2023-05-18 | Stop reason: HOSPADM

## 2023-05-06 RX ORDER — POLYETHYLENE GLYCOL 3350 17 G/17G
17 POWDER, FOR SOLUTION ORAL DAILY PRN
Status: DISCONTINUED | OUTPATIENT
Start: 2023-05-06 | End: 2023-05-18 | Stop reason: HOSPADM

## 2023-05-06 RX ORDER — HALOPERIDOL 5 MG/1
2.5 TABLET ORAL EVERY 4 HOURS PRN
Status: DISCONTINUED | OUTPATIENT
Start: 2023-05-06 | End: 2023-05-18 | Stop reason: HOSPADM

## 2023-05-06 RX ORDER — LANOLIN ALCOHOL/MO/W.PET/CERES
3 CREAM (GRAM) TOPICAL NIGHTLY PRN
Status: DISCONTINUED | OUTPATIENT
Start: 2023-05-06 | End: 2023-05-18 | Stop reason: HOSPADM

## 2023-05-06 RX ORDER — ACETAMINOPHEN 325 MG/1
650 TABLET ORAL EVERY 4 HOURS PRN
Status: DISCONTINUED | OUTPATIENT
Start: 2023-05-06 | End: 2023-05-18 | Stop reason: HOSPADM

## 2023-05-06 RX ORDER — DIPHENHYDRAMINE HYDROCHLORIDE 50 MG/ML
25 INJECTION INTRAMUSCULAR; INTRAVENOUS EVERY 4 HOURS PRN
Status: DISCONTINUED | OUTPATIENT
Start: 2023-05-06 | End: 2023-05-18 | Stop reason: HOSPADM

## 2023-05-06 RX ORDER — SENNA PLUS 8.6 MG/1
1 TABLET ORAL DAILY PRN
Status: DISCONTINUED | OUTPATIENT
Start: 2023-05-06 | End: 2023-05-18 | Stop reason: HOSPADM

## 2023-05-06 RX ORDER — MAGNESIUM HYDROXIDE/ALUMINUM HYDROXICE/SIMETHICONE 120; 1200; 1200 MG/30ML; MG/30ML; MG/30ML
30 SUSPENSION ORAL EVERY 6 HOURS PRN
Status: DISCONTINUED | OUTPATIENT
Start: 2023-05-06 | End: 2023-05-18 | Stop reason: HOSPADM

## 2023-05-06 RX ORDER — HALOPERIDOL 5 MG/ML
2.5 INJECTION INTRAMUSCULAR EVERY 4 HOURS PRN
Status: DISCONTINUED | OUTPATIENT
Start: 2023-05-06 | End: 2023-05-18 | Stop reason: HOSPADM

## 2023-05-06 ASSESSMENT — SLEEP AND FATIGUE QUESTIONNAIRES
DO YOU USE A SLEEP AID: NO
DO YOU HAVE DIFFICULTY SLEEPING: NO
AVERAGE NUMBER OF SLEEP HOURS: 9

## 2023-05-06 ASSESSMENT — PAIN - FUNCTIONAL ASSESSMENT: PAIN_FUNCTIONAL_ASSESSMENT: NONE - DENIES PAIN

## 2023-05-06 ASSESSMENT — LIFESTYLE VARIABLES
HOW OFTEN DO YOU HAVE A DRINK CONTAINING ALCOHOL: NEVER
HOW MANY STANDARD DRINKS CONTAINING ALCOHOL DO YOU HAVE ON A TYPICAL DAY: PATIENT DOES NOT DRINK
HOW OFTEN DO YOU HAVE A DRINK CONTAINING ALCOHOL: 2-4 TIMES A MONTH

## 2023-05-06 ASSESSMENT — PAIN SCALES - WONG BAKER: WONGBAKER_NUMERICALRESPONSE: 0

## 2023-05-07 PROCEDURE — 6370000000 HC RX 637 (ALT 250 FOR IP): Performed by: NURSE PRACTITIONER

## 2023-05-07 PROCEDURE — 1240000000 HC EMOTIONAL WELLNESS R&B

## 2023-05-07 RX ORDER — OLANZAPINE 10 MG/1
10 TABLET ORAL NIGHTLY
Status: ON HOLD | COMMUNITY
Start: 2023-04-27 | End: 2023-05-18 | Stop reason: HOSPADM

## 2023-05-07 RX ORDER — LURASIDONE HYDROCHLORIDE 20 MG/1
TABLET, FILM COATED ORAL
Status: ON HOLD | COMMUNITY
Start: 2023-04-21 | End: 2023-05-18 | Stop reason: HOSPADM

## 2023-05-07 RX ORDER — LISINOPRIL 10 MG/1
30 TABLET ORAL DAILY
Status: DISCONTINUED | OUTPATIENT
Start: 2023-05-07 | End: 2023-05-18 | Stop reason: HOSPADM

## 2023-05-07 RX ORDER — HYDROXYZINE HYDROCHLORIDE 10 MG/1
TABLET, FILM COATED ORAL
Status: ON HOLD | COMMUNITY
Start: 2023-04-21 | End: 2023-05-18 | Stop reason: HOSPADM

## 2023-05-07 RX ORDER — L.ACIDOPH,PLANT/B.ANIMAL,LONG 2B CELL
CAPSULE ORAL
COMMUNITY

## 2023-05-07 RX ORDER — FOLIC ACID 1 MG/1
3 TABLET ORAL DAILY
Status: DISCONTINUED | OUTPATIENT
Start: 2023-05-07 | End: 2023-05-18 | Stop reason: HOSPADM

## 2023-05-07 RX ORDER — OLANZAPINE 5 MG/1
10 TABLET ORAL NIGHTLY
Status: DISCONTINUED | OUTPATIENT
Start: 2023-05-07 | End: 2023-05-08

## 2023-05-07 RX ORDER — BENZONATATE 100 MG/1
100 CAPSULE ORAL 3 TIMES DAILY PRN
Status: DISCONTINUED | OUTPATIENT
Start: 2023-05-07 | End: 2023-05-18 | Stop reason: HOSPADM

## 2023-05-07 RX ORDER — BENZONATATE 100 MG/1
100 CAPSULE ORAL 3 TIMES DAILY PRN
COMMUNITY

## 2023-05-07 RX ORDER — LURASIDONE HYDROCHLORIDE 20 MG/1
20 TABLET, FILM COATED ORAL DAILY
Status: DISCONTINUED | OUTPATIENT
Start: 2023-05-07 | End: 2023-05-08

## 2023-05-07 RX ADMIN — LURASIDONE HYDROCHLORIDE 20 MG: 20 TABLET ORAL at 10:29

## 2023-05-07 RX ADMIN — LISINOPRIL 30 MG: 10 TABLET ORAL at 10:29

## 2023-05-07 RX ADMIN — FOLIC ACID 3 MG: 1 TABLET ORAL at 10:29

## 2023-05-07 RX ADMIN — OLANZAPINE 10 MG: 5 TABLET, FILM COATED ORAL at 20:48

## 2023-05-07 ASSESSMENT — PAIN SCALES - GENERAL: PAINLEVEL_OUTOF10: 0

## 2023-05-08 LAB
ANION GAP SERPL CALC-SCNC: 1 MMOL/L (ref 5–15)
BUN SERPL-MCNC: 19 MG/DL (ref 6–20)
BUN/CREAT SERPL: 25 (ref 12–20)
CALCIUM SERPL-MCNC: 8.9 MG/DL (ref 8.5–10.1)
CHLORIDE SERPL-SCNC: 109 MMOL/L (ref 97–108)
CHOLEST SERPL-MCNC: 157 MG/DL
CO2 SERPL-SCNC: 29 MMOL/L (ref 21–32)
CREAT SERPL-MCNC: 0.75 MG/DL (ref 0.55–1.02)
EST. AVERAGE GLUCOSE BLD GHB EST-MCNC: 111 MG/DL
GLUCOSE SERPL-MCNC: 102 MG/DL (ref 65–100)
HBA1C MFR BLD: 5.5 % (ref 4–5.6)
HDLC SERPL-MCNC: 79 MG/DL
HDLC SERPL: 2 (ref 0–5)
LDLC SERPL CALC-MCNC: 69 MG/DL (ref 0–100)
POTASSIUM SERPL-SCNC: 4 MMOL/L (ref 3.5–5.1)
SODIUM SERPL-SCNC: 139 MMOL/L (ref 136–145)
TRIGL SERPL-MCNC: 45 MG/DL
TSH SERPL DL<=0.05 MIU/L-ACNC: 1.04 UIU/ML (ref 0.36–3.74)
VLDLC SERPL CALC-MCNC: 9 MG/DL

## 2023-05-08 PROCEDURE — 36415 COLL VENOUS BLD VENIPUNCTURE: CPT

## 2023-05-08 PROCEDURE — 6370000000 HC RX 637 (ALT 250 FOR IP): Performed by: PSYCHIATRY & NEUROLOGY

## 2023-05-08 PROCEDURE — 80048 BASIC METABOLIC PNL TOTAL CA: CPT

## 2023-05-08 PROCEDURE — 6370000000 HC RX 637 (ALT 250 FOR IP): Performed by: NURSE PRACTITIONER

## 2023-05-08 PROCEDURE — 80061 LIPID PANEL: CPT

## 2023-05-08 PROCEDURE — 84443 ASSAY THYROID STIM HORMONE: CPT

## 2023-05-08 PROCEDURE — 83036 HEMOGLOBIN GLYCOSYLATED A1C: CPT

## 2023-05-08 PROCEDURE — 1240000000 HC EMOTIONAL WELLNESS R&B

## 2023-05-08 RX ORDER — LURASIDONE HYDROCHLORIDE 20 MG/1
20 TABLET, FILM COATED ORAL ONCE
Status: COMPLETED | OUTPATIENT
Start: 2023-05-08 | End: 2023-05-08

## 2023-05-08 RX ORDER — LURASIDONE HYDROCHLORIDE 40 MG/1
40 TABLET, FILM COATED ORAL
Status: DISCONTINUED | OUTPATIENT
Start: 2023-05-09 | End: 2023-05-12

## 2023-05-08 RX ADMIN — HYDROXYZINE HYDROCHLORIDE 10 MG: 10 TABLET ORAL at 00:40

## 2023-05-08 RX ADMIN — Medication 3 MG: at 00:40

## 2023-05-08 RX ADMIN — FOLIC ACID 3 MG: 1 TABLET ORAL at 09:30

## 2023-05-08 RX ADMIN — LURASIDONE HYDROCHLORIDE 20 MG: 20 TABLET ORAL at 17:00

## 2023-05-08 RX ADMIN — LURASIDONE HYDROCHLORIDE 20 MG: 20 TABLET ORAL at 09:30

## 2023-05-08 ASSESSMENT — PAIN SCALES - GENERAL
PAINLEVEL_OUTOF10: 0
PAINLEVEL_OUTOF10: 0

## 2023-05-08 ASSESSMENT — PAIN SCALES - WONG BAKER: WONGBAKER_NUMERICALRESPONSE: 0

## 2023-05-09 PROCEDURE — 6370000000 HC RX 637 (ALT 250 FOR IP): Performed by: PSYCHIATRY & NEUROLOGY

## 2023-05-09 PROCEDURE — 1240000000 HC EMOTIONAL WELLNESS R&B

## 2023-05-09 PROCEDURE — 6370000000 HC RX 637 (ALT 250 FOR IP): Performed by: NURSE PRACTITIONER

## 2023-05-09 RX ORDER — LEUCOVORIN CALCIUM 5 MG/1
5 TABLET ORAL DAILY
Status: COMPLETED | OUTPATIENT
Start: 2023-05-11 | End: 2023-05-13

## 2023-05-09 RX ADMIN — LURASIDONE HYDROCHLORIDE 40 MG: 40 TABLET ORAL at 17:17

## 2023-05-09 RX ADMIN — FOLIC ACID 3 MG: 1 TABLET ORAL at 08:42

## 2023-05-09 RX ADMIN — LISINOPRIL 30 MG: 10 TABLET ORAL at 08:42

## 2023-05-10 PROCEDURE — 1240000000 HC EMOTIONAL WELLNESS R&B

## 2023-05-10 PROCEDURE — 6360000002 HC RX W HCPCS: Performed by: PSYCHIATRY & NEUROLOGY

## 2023-05-10 PROCEDURE — 6370000000 HC RX 637 (ALT 250 FOR IP): Performed by: PSYCHIATRY & NEUROLOGY

## 2023-05-10 PROCEDURE — 6370000000 HC RX 637 (ALT 250 FOR IP): Performed by: NURSE PRACTITIONER

## 2023-05-10 RX ADMIN — LISINOPRIL 30 MG: 10 TABLET ORAL at 08:34

## 2023-05-10 RX ADMIN — LURASIDONE HYDROCHLORIDE 40 MG: 40 TABLET ORAL at 18:02

## 2023-05-10 RX ADMIN — METHOTREXATE 15 MG: 2.5 TABLET ORAL at 08:36

## 2023-05-10 RX ADMIN — FOLIC ACID 3 MG: 1 TABLET ORAL at 08:35

## 2023-05-10 ASSESSMENT — PAIN SCALES - WONG BAKER: WONGBAKER_NUMERICALRESPONSE: 0

## 2023-05-10 ASSESSMENT — PAIN SCALES - GENERAL
PAINLEVEL_OUTOF10: 0
PAINLEVEL_OUTOF10: 0

## 2023-05-11 PROCEDURE — 6370000000 HC RX 637 (ALT 250 FOR IP): Performed by: PSYCHIATRY & NEUROLOGY

## 2023-05-11 PROCEDURE — 1240000000 HC EMOTIONAL WELLNESS R&B

## 2023-05-11 PROCEDURE — 6370000000 HC RX 637 (ALT 250 FOR IP): Performed by: NURSE PRACTITIONER

## 2023-05-11 RX ORDER — GABAPENTIN 100 MG/1
100 CAPSULE ORAL
Status: DISCONTINUED | OUTPATIENT
Start: 2023-05-11 | End: 2023-05-11

## 2023-05-11 RX ORDER — GABAPENTIN 100 MG/1
200 CAPSULE ORAL
Status: DISCONTINUED | OUTPATIENT
Start: 2023-05-11 | End: 2023-05-13

## 2023-05-11 RX ORDER — OLANZAPINE 5 MG/1
10 TABLET ORAL NIGHTLY
Status: DISCONTINUED | OUTPATIENT
Start: 2023-05-11 | End: 2023-05-13

## 2023-05-11 RX ADMIN — LISINOPRIL 30 MG: 10 TABLET ORAL at 08:07

## 2023-05-11 RX ADMIN — FOLIC ACID 3 MG: 1 TABLET ORAL at 08:07

## 2023-05-11 RX ADMIN — LEUCOVORIN CALCIUM 5 MG: 5 TABLET ORAL at 08:07

## 2023-05-11 RX ADMIN — GABAPENTIN 200 MG: 100 CAPSULE ORAL at 21:05

## 2023-05-11 RX ADMIN — LURASIDONE HYDROCHLORIDE 40 MG: 40 TABLET ORAL at 17:43

## 2023-05-11 RX ADMIN — OLANZAPINE 10 MG: 5 TABLET, FILM COATED ORAL at 22:22

## 2023-05-11 RX ADMIN — HYDROXYZINE HYDROCHLORIDE 10 MG: 10 TABLET ORAL at 00:36

## 2023-05-11 RX ADMIN — Medication 3 MG: at 20:32

## 2023-05-11 ASSESSMENT — PAIN SCALES - WONG BAKER: WONGBAKER_NUMERICALRESPONSE: 0

## 2023-05-11 ASSESSMENT — PAIN SCALES - GENERAL: PAINLEVEL_OUTOF10: 0

## 2023-05-11 ASSESSMENT — PAIN - FUNCTIONAL ASSESSMENT: PAIN_FUNCTIONAL_ASSESSMENT: NONE - DENIES PAIN

## 2023-05-12 PROCEDURE — 6370000000 HC RX 637 (ALT 250 FOR IP): Performed by: PSYCHIATRY & NEUROLOGY

## 2023-05-12 PROCEDURE — 1240000000 HC EMOTIONAL WELLNESS R&B

## 2023-05-12 PROCEDURE — 6370000000 HC RX 637 (ALT 250 FOR IP): Performed by: NURSE PRACTITIONER

## 2023-05-12 RX ADMIN — GABAPENTIN 200 MG: 100 CAPSULE ORAL at 21:18

## 2023-05-12 RX ADMIN — Medication 3 MG: at 21:18

## 2023-05-12 RX ADMIN — LEUCOVORIN CALCIUM 5 MG: 5 TABLET ORAL at 08:04

## 2023-05-12 RX ADMIN — LISINOPRIL 30 MG: 10 TABLET ORAL at 08:04

## 2023-05-12 RX ADMIN — OLANZAPINE 10 MG: 5 TABLET, FILM COATED ORAL at 21:18

## 2023-05-12 RX ADMIN — LURASIDONE HYDROCHLORIDE 60 MG: 40 TABLET ORAL at 17:22

## 2023-05-12 RX ADMIN — FOLIC ACID 3 MG: 1 TABLET ORAL at 08:04

## 2023-05-12 ASSESSMENT — PAIN SCALES - GENERAL
PAINLEVEL_OUTOF10: 0

## 2023-05-13 PROCEDURE — 6370000000 HC RX 637 (ALT 250 FOR IP): Performed by: PSYCHIATRY & NEUROLOGY

## 2023-05-13 PROCEDURE — 1240000000 HC EMOTIONAL WELLNESS R&B

## 2023-05-13 PROCEDURE — 6370000000 HC RX 637 (ALT 250 FOR IP): Performed by: NURSE PRACTITIONER

## 2023-05-13 RX ORDER — GABAPENTIN 300 MG/1
300 CAPSULE ORAL
Status: DISCONTINUED | OUTPATIENT
Start: 2023-05-13 | End: 2023-05-18 | Stop reason: HOSPADM

## 2023-05-13 RX ADMIN — GABAPENTIN 300 MG: 300 CAPSULE ORAL at 21:09

## 2023-05-13 RX ADMIN — Medication 3 MG: at 21:08

## 2023-05-13 RX ADMIN — LURASIDONE HYDROCHLORIDE 60 MG: 40 TABLET ORAL at 16:52

## 2023-05-13 RX ADMIN — OLANZAPINE 7.5 MG: 5 TABLET, FILM COATED ORAL at 21:09

## 2023-05-13 RX ADMIN — HYDROXYZINE HYDROCHLORIDE 10 MG: 10 TABLET ORAL at 21:09

## 2023-05-13 RX ADMIN — FOLIC ACID 3 MG: 1 TABLET ORAL at 08:11

## 2023-05-13 RX ADMIN — LISINOPRIL 30 MG: 10 TABLET ORAL at 08:11

## 2023-05-13 RX ADMIN — LEUCOVORIN CALCIUM 5 MG: 5 TABLET ORAL at 08:11

## 2023-05-13 ASSESSMENT — PAIN SCALES - GENERAL
PAINLEVEL_OUTOF10: 0
PAINLEVEL_OUTOF10: 0

## 2023-05-14 PROCEDURE — 1240000000 HC EMOTIONAL WELLNESS R&B

## 2023-05-14 PROCEDURE — 6370000000 HC RX 637 (ALT 250 FOR IP): Performed by: PSYCHIATRY & NEUROLOGY

## 2023-05-14 PROCEDURE — 6370000000 HC RX 637 (ALT 250 FOR IP): Performed by: NURSE PRACTITIONER

## 2023-05-14 RX ORDER — OLANZAPINE 5 MG/1
5 TABLET ORAL NIGHTLY
Status: DISCONTINUED | OUTPATIENT
Start: 2023-05-14 | End: 2023-05-15

## 2023-05-14 RX ADMIN — OLANZAPINE 5 MG: 5 TABLET, FILM COATED ORAL at 20:45

## 2023-05-14 RX ADMIN — FOLIC ACID 3 MG: 1 TABLET ORAL at 09:14

## 2023-05-14 RX ADMIN — HYDROXYZINE HYDROCHLORIDE 10 MG: 10 TABLET ORAL at 17:07

## 2023-05-14 RX ADMIN — GABAPENTIN 300 MG: 300 CAPSULE ORAL at 20:45

## 2023-05-14 RX ADMIN — LURASIDONE HYDROCHLORIDE 60 MG: 40 TABLET ORAL at 17:07

## 2023-05-14 RX ADMIN — LISINOPRIL 30 MG: 10 TABLET ORAL at 09:14

## 2023-05-14 ASSESSMENT — PAIN SCALES - GENERAL
PAINLEVEL_OUTOF10: 0
PAINLEVEL_OUTOF10: 0

## 2023-05-15 ENCOUNTER — APPOINTMENT (OUTPATIENT)
Facility: HOSPITAL | Age: 68
DRG: 885 | End: 2023-05-15
Payer: MEDICARE

## 2023-05-15 LAB
EKG ATRIAL RATE: 67 BPM
EKG DIAGNOSIS: NORMAL
EKG P AXIS: 59 DEGREES
EKG P-R INTERVAL: 136 MS
EKG Q-T INTERVAL: 410 MS
EKG QRS DURATION: 72 MS
EKG QTC CALCULATION (BAZETT): 433 MS
EKG R AXIS: 47 DEGREES
EKG T AXIS: 53 DEGREES
EKG VENTRICULAR RATE: 67 BPM

## 2023-05-15 PROCEDURE — 6370000000 HC RX 637 (ALT 250 FOR IP): Performed by: PSYCHIATRY & NEUROLOGY

## 2023-05-15 PROCEDURE — 93005 ELECTROCARDIOGRAM TRACING: CPT | Performed by: PSYCHIATRY & NEUROLOGY

## 2023-05-15 PROCEDURE — 1240000000 HC EMOTIONAL WELLNESS R&B

## 2023-05-15 PROCEDURE — 6370000000 HC RX 637 (ALT 250 FOR IP): Performed by: NURSE PRACTITIONER

## 2023-05-15 PROCEDURE — 71045 X-RAY EXAM CHEST 1 VIEW: CPT

## 2023-05-15 RX ORDER — TRAZODONE HYDROCHLORIDE 100 MG/1
100 TABLET ORAL NIGHTLY PRN
Status: DISCONTINUED | OUTPATIENT
Start: 2023-05-15 | End: 2023-05-18 | Stop reason: HOSPADM

## 2023-05-15 RX ADMIN — GABAPENTIN 300 MG: 300 CAPSULE ORAL at 20:37

## 2023-05-15 RX ADMIN — FOLIC ACID 3 MG: 1 TABLET ORAL at 08:49

## 2023-05-15 RX ADMIN — OLANZAPINE 5 MG: 5 TABLET, FILM COATED ORAL at 20:37

## 2023-05-15 RX ADMIN — LURASIDONE HYDROCHLORIDE 60 MG: 40 TABLET ORAL at 17:58

## 2023-05-15 RX ADMIN — LISINOPRIL 30 MG: 10 TABLET ORAL at 08:49

## 2023-05-15 ASSESSMENT — PAIN SCALES - GENERAL: PAINLEVEL_OUTOF10: 0

## 2023-05-15 NOTE — PLAN OF CARE
Problem: Anxiety  Goal: Will report anxiety at manageable levels  Description: INTERVENTIONS:  1. Administer medication as ordered  2. Teach and rehearse alternative coping skills  3. Provide emotional support with 1:1 interaction with staff  5/15/2023 1554 by Adelso Marina LPN  Outcome: Progressing  Flowsheets (Taken 5/15/2023 1530)  Will report anxiety at manageable levels:   Provide emotional support with 1:1 interaction with staff   Administer medication as ordered      Received pt lying in bed resting, breathing even/unlabored. No verbal or observed distress at the present. Q15 min check in place for safety.

## 2023-05-15 NOTE — INTERDISCIPLINARY ROUNDS
Behavioral Health Interdisciplinary Rounds     Patient Name: Doug Frankel  Age: 78 y.o.   Room/Bed:  6/  Primary Diagnosis: Depression   Admission Status: Voluntary    Readmission within 30 days: no  Power of  in place: no  Patient requires a blocked bed: no            Sleep hours: 5       Participation in Care/Groups:   yes  Medication Compliant?:  yes  PRNS (last 24 hours):  antianxiety     Restraints (last 24 hours):  no  __________________________________________________  OQ Admission Analysis Survey completed:  OQ Admission Analysis Survey score:  OQ Discharge Analysis Survey completed:  OQ Discharge Analysis Survey score:   __________________________________________________     Alcohol screening (AUDIT) completed -     If applicable, date SBIRT discussed in treatment team AND documented:    Tobacco - patient is a smoker:    Illegal Drugs use:      24 hour chart check complete:  yes      _______________________________________________    Patient goal(s) for today:   Treatment team focus/goals:   Progress note:      Spiritual Care Consult:   Financial concerns/prescription coverage:    Family contact:                        Family requesting physician contact today:    Discharge plan:   Access to weapons :                                                              Outpatient provider(s):   Patient's preferred phone number for follow up call :   Patient's preferred e-mail address :    LOS:  9  Expected LOS:     Participating treatment team members: Laura Wade, * (assigned SW),

## 2023-05-15 NOTE — PLAN OF CARE
Problem: Depression  Goal: Will be euthymic at discharge  Description: INTERVENTIONS:  1. Administer medication as ordered  2. Provide emotional support via 1:1 interaction with staff  3. Encourage involvement in milieu/groups/activities  4. Monitor for social isolation  Outcome: Progressing  Note: Out on unit engaged w peers and staff. Mood and affect bright stable and hopeful. Thoughts organized, logical and goal directed. Journal entries logical and organized. Daily goal is to stay active on unit and talk with friends. Denies SI, no self harming behaviors. Staff focus is on offering support     Problem: Anxiety  Goal: Will report anxiety at manageable levels  Description: INTERVENTIONS:  1. Administer medication as ordered  2. Teach and rehearse alternative coping skills  3.  Provide emotional support with 1:1 interaction with staff  Outcome: Progressing

## 2023-05-16 PROCEDURE — 1240000000 HC EMOTIONAL WELLNESS R&B

## 2023-05-16 PROCEDURE — 6370000000 HC RX 637 (ALT 250 FOR IP): Performed by: NURSE PRACTITIONER

## 2023-05-16 PROCEDURE — 6370000000 HC RX 637 (ALT 250 FOR IP): Performed by: PSYCHIATRY & NEUROLOGY

## 2023-05-16 RX ORDER — OLANZAPINE 2.5 MG/1
2.5 TABLET ORAL NIGHTLY
Status: DISCONTINUED | OUTPATIENT
Start: 2023-05-16 | End: 2023-05-17

## 2023-05-16 RX ORDER — TRAZODONE HYDROCHLORIDE 50 MG/1
50 TABLET ORAL NIGHTLY
Status: DISCONTINUED | OUTPATIENT
Start: 2023-05-16 | End: 2023-05-18 | Stop reason: HOSPADM

## 2023-05-16 RX ADMIN — LURASIDONE HYDROCHLORIDE 60 MG: 40 TABLET ORAL at 16:44

## 2023-05-16 RX ADMIN — LISINOPRIL 30 MG: 10 TABLET ORAL at 08:30

## 2023-05-16 RX ADMIN — TRAZODONE HYDROCHLORIDE 50 MG: 50 TABLET ORAL at 20:27

## 2023-05-16 RX ADMIN — FOLIC ACID 3 MG: 1 TABLET ORAL at 08:30

## 2023-05-16 RX ADMIN — GABAPENTIN 300 MG: 300 CAPSULE ORAL at 20:26

## 2023-05-16 RX ADMIN — OLANZAPINE 2.5 MG: 2.5 TABLET, FILM COATED ORAL at 20:26

## 2023-05-16 ASSESSMENT — PAIN SCALES - GENERAL
PAINLEVEL_OUTOF10: 0
PAINLEVEL_OUTOF10: 0

## 2023-05-16 NOTE — GROUP NOTE
Group Therapy Note    Date: 5/16/2023    Group Start Time: 1100  Group End Time: 3207  Group Topic: Art Therapy     [facility] 7W GEN BEHAV TH    Ami Diamond RN        Group Therapy Note    Attendees:        Patient's Goal:  decorate journal    Notes:  full participation    Status After Intervention:  Improved    Participation Level:  Active Listener    Participation Quality: Appropriate      Discipline Responsible: Behavorial Health Tech      Signature:  Robbin Calvo RN

## 2023-05-16 NOTE — PROGRESS NOTES
SPIRITUALITY GROUP      Meeting Topic: Hope    Meeting Time:  45-60 minutes    Meeting Goal: Patients will define hope, reflect on hope versus veronica, and practice building hope through relationships, goals, coping and spirituality. Laura Wade attended and participated in the group appropriately respective of current diagnosis. For additional spiritual care, please contact the  on-call at ([phone]). .  Dinorah Chambers MDiv, MS, [facility]  Staff

## 2023-05-16 NOTE — PLAN OF CARE
Problem: Anxiety  Goal: Will report anxiety at manageable levels  Description: INTERVENTIONS:  1. Administer medication as ordered  2. Teach and rehearse alternative coping skills  3.  Provide emotional support with 1:1 interaction with staff  5/16/2023 1611 by Karin Reilly RN  Outcome: Progressing

## 2023-05-16 NOTE — PROGRESS NOTES
Behavioral Health Treatment Team Note         Patient goal(s) for today:   Treatment team focus/goals: Plan to titrate her medications. Progress note :She denies SI, denies any issues with her medications, sleeping better, and feels lighter.      LOS:  10  Expected LOS: Thursday     Financial concerns/prescription coverage:  medicare  Family contact:  brother and sister-in-law     Family requesting physician contact today:  yes - brother would lkke a call  Discharge plan: she will return home alone   Guns in the home: no        Outpatient provider(s): Dr. Aleksandar Suarez    Participating treatment team members: Mich Neff Dr. - Governor Ramos

## 2023-05-17 PROCEDURE — 1240000000 HC EMOTIONAL WELLNESS R&B

## 2023-05-17 PROCEDURE — 6360000002 HC RX W HCPCS: Performed by: PSYCHIATRY & NEUROLOGY

## 2023-05-17 PROCEDURE — 6370000000 HC RX 637 (ALT 250 FOR IP): Performed by: PSYCHIATRY & NEUROLOGY

## 2023-05-17 PROCEDURE — 6370000000 HC RX 637 (ALT 250 FOR IP): Performed by: NURSE PRACTITIONER

## 2023-05-17 RX ADMIN — METHOTREXATE 15 MG: 2.5 TABLET ORAL at 08:44

## 2023-05-17 RX ADMIN — LISINOPRIL 30 MG: 10 TABLET ORAL at 08:43

## 2023-05-17 RX ADMIN — LURASIDONE HYDROCHLORIDE 60 MG: 40 TABLET ORAL at 17:26

## 2023-05-17 RX ADMIN — FOLIC ACID 3 MG: 1 TABLET ORAL at 08:43

## 2023-05-17 RX ADMIN — GABAPENTIN 300 MG: 300 CAPSULE ORAL at 20:17

## 2023-05-17 RX ADMIN — TRAZODONE HYDROCHLORIDE 50 MG: 50 TABLET ORAL at 20:17

## 2023-05-17 ASSESSMENT — PAIN SCALES - GENERAL: PAINLEVEL_OUTOF10: 0

## 2023-05-17 NOTE — INTERDISCIPLINARY ROUNDS
Behavioral Health Interdisciplinary Rounds     Patient Name: Estrella Turner  Age: 78 y.o.   Room/Bed:  [facility]/  Primary Diagnosis: Depression   Admission Status: Voluntary    Readmission within 30 days: no  Power of  in place: no  Patient requires a blocked bed: no            Sleep hours: 7       Participation in Care/Groups:   yes  Medication Compliant?:  yes  PRNS (last 24 hours):  none     Restraints (last 24 hours):  no  __________________________________________________  OQ Admission Analysis Survey completed:  OQ Admission Analysis Survey score:  OQ Discharge Analysis Survey completed:  OQ Discharge Analysis Survey score:   __________________________________________________     Alcohol screening (AUDIT) completed -     If applicable, date SBIRT discussed in treatment team AND documented:    Tobacco - patient is a smoker:    Illegal Drugs use:      24 hour chart check complete:  yes      _______________________________________________    Patient goal(s) for today:   Treatment team focus/goals:   Progress note:      Spiritual Care Consult:   Financial concerns/prescription coverage:    Family contact:                        Family requesting physician contact today:    Discharge plan:   Access to weapons :                                                              Outpatient provider(s):   Patient's preferred phone number for follow up call :   Patient's preferred e-mail address :    LOS:  11  Expected LOS:     Participating treatment team members: Laura Wade, * (assigned SW),

## 2023-05-17 NOTE — PROGRESS NOTES
Behavioral Health Treatment Team Note         Patient goal(s) for today:   Treatment team focus/goals: Plan to set up discharge for Thursday   Progress note : She denies SI, denies any issues with her medications or treatment , sleeping better     LOS:  11  Expected LOS: tomorrow    Financial concerns/prescription coverage:  medicare   Family contact:  brother      Family requesting physician contact today:  No  Discharge plan: she will return home   Guns in the home: no        Outpatient provider(s):   Dr. Onelia Lino   Participating treatment team members: Eloise Emanuel Dr.

## 2023-05-17 NOTE — PROGRESS NOTES
Referral source:  initiated visit due to Laura Wade length of stay [address]. I reviewed the patient's medical record prior to this encounter. Assessment: Progressing towards meaning/purpose    Outcome:  Laura Wade identified new spiritual pathways which may aid in her ongoing healing. She thanked me for my support during this admission. Plan of Care: Pt is preparing for d/c. No follow up warranted.

## 2023-05-17 NOTE — PLAN OF CARE
Problem: Safety - Adult  Goal: Free from fall injury  5/17/2023 0929 by Erica Mccormick RN  Outcome: Progressing  5/17/2023 0245 by Edith Ho RN  Outcome: Progressing     Problem: Pain  Goal: Verbalizes/displays adequate comfort level or baseline comfort level  Outcome: Progressing     Problem: Depression  Goal: Will be euthymic at discharge  Description: INTERVENTIONS:  1. Administer medication as ordered  2. Provide emotional support via 1:1 interaction with staff  3. Encourage involvement in milieu/groups/activities  4. Monitor for social isolation  Outcome: Progressing    Patient awake and alert and present in the milieu. Interacts well with staff and peers. Med and meal compliant. Will continue to monitor q15 minutes for safety checks.

## 2023-05-18 VITALS
DIASTOLIC BLOOD PRESSURE: 71 MMHG | RESPIRATION RATE: 16 BRPM | TEMPERATURE: 98.2 F | SYSTOLIC BLOOD PRESSURE: 107 MMHG | OXYGEN SATURATION: 98 % | BODY MASS INDEX: 20.25 KG/M2 | WEIGHT: 129 LBS | HEART RATE: 77 BPM | HEIGHT: 67 IN

## 2023-05-18 PROCEDURE — 6370000000 HC RX 637 (ALT 250 FOR IP): Performed by: NURSE PRACTITIONER

## 2023-05-18 PROCEDURE — 6370000000 HC RX 637 (ALT 250 FOR IP): Performed by: PSYCHIATRY & NEUROLOGY

## 2023-05-18 RX ORDER — LURASIDONE HYDROCHLORIDE 60 MG/1
60 TABLET, FILM COATED ORAL
Qty: 30 TABLET | Refills: 0 | Status: SHIPPED | OUTPATIENT
Start: 2023-05-18 | End: 2023-06-17

## 2023-05-18 RX ORDER — LEUCOVORIN CALCIUM 5 MG/1
5 TABLET ORAL ONCE
Status: COMPLETED | OUTPATIENT
Start: 2023-05-18 | End: 2023-05-18

## 2023-05-18 RX ORDER — TRAZODONE HYDROCHLORIDE 50 MG/1
50 TABLET ORAL NIGHTLY
Qty: 30 TABLET | Refills: 0 | Status: SHIPPED | OUTPATIENT
Start: 2023-05-18 | End: 2023-06-17

## 2023-05-18 RX ORDER — GABAPENTIN 300 MG/1
300 CAPSULE ORAL
Qty: 30 CAPSULE | Refills: 0 | Status: SHIPPED | OUTPATIENT
Start: 2023-05-18 | End: 2023-06-17

## 2023-05-18 RX ADMIN — LEUCOVORIN CALCIUM 5 MG: 5 TABLET ORAL at 11:26

## 2023-05-18 RX ADMIN — HYDROXYZINE HYDROCHLORIDE 10 MG: 10 TABLET ORAL at 02:16

## 2023-05-18 RX ADMIN — FOLIC ACID 3 MG: 1 TABLET ORAL at 08:33

## 2023-05-18 ASSESSMENT — PAIN SCALES - GENERAL
PAINLEVEL_OUTOF10: 0
PAINLEVEL_OUTOF10: 0

## 2023-05-18 NOTE — DISCHARGE SUMMARY
DISCHARGE SUMMARY    Some parts of the discharge summary are from the initial Psychiatric interview that was done on admission by the admitting psychiatrist.     Date of Admission: 5/6/2023    Date of Discharge: 5/18/2023     TYPE OF DISCHARGE:   REGULAR -  YES    ADMISSION EVALUATION:  PSYCHIATRY EVALUATION NOTE     CHIEF COMPLAINT: \"I'm having trouble absorbing the hours in the day\"        HISTORY OF PRESENTING COMPLAINT:  Laura Wade is a 78 y.o. White (non-) female who is currently admitted to the acute/general side of 7th floor behavioral health Unit at [facility]. She states that she is having difficulty with keeping herself busy throughout the day. She states that she recently moved and wants to unpack boxes, but feels like if she does then she will not have anything else to do. She states that she wants to get back to her normal active life. She presents with thought blocking. She states that she found her medications at the bottom of a trash bag and did not realize that she hadn't been taking them. Per report, she sees Dr. Zuleyka Suarez who had been in the process of switching her from zyprexa to latuda. She is unable to tell what dose she is taking or how long she has been on them. She states that her medications we doing well previously. She endorses an inpatient hospitalization several months ago for a panic attack. She states that she lives alone. She denies any drug or alcohol use. She denies SI/Hi/AVH currently. PAST PSYCHIATRIC HISTORY   At least 1 previous hospitalization. Has outpatient psych services     SUBSTANCE USE HISTORY:  Denies     PAST MEDICAL HISTORY:     Please see H&P for details. Past Medical History   No past medical history on file. Home Medications           Prior to Admission medications    Medication Sig Start Date End Date Taking?  Authorizing Provider   Probiotic Product (PROBIOTIC ACIDOPHILUS BEADS) CAPS Take by mouth     Yes Historical

## 2023-05-18 NOTE — PLAN OF CARE
Patient in bed with eyes closed and breathing even and unlabored. Night light on dim to provide light and promote rest. Non-skid socks worn to prevent falls. Room free of clutter. Patient monitored every 15 minutes for safety.

## 2023-05-18 NOTE — PROGRESS NOTES
Pharmacist Discharge Medication Reconciliation    Discharging Provider: Dr. Hugo Boone [facility]: No past medical history on file. Chief Complaint for this Admission:   Chief Complaint   Patient presents with    Depression    Mental Health Problem     Allergies: Codeine, Fructose, and Lac bovis    Discharge Medications:      Medication List        START taking these medications      gabapentin 300 MG capsule  Commonly known as: NEURONTIN  Take 1 capsule by mouth nightly for 30 days. Max Daily Amount: 300 mg     traZODone 50 MG tablet  Commonly known as: DESYREL  Take 1 tablet by mouth nightly            CHANGE how you take these medications      lurasidone 60 MG Tabs tablet  Commonly known as: LATUDA  Take 1 tablet by mouth Daily with supper  What changed:   medication strength  See the new instructions. CONTINUE taking these medications      benzonatate 100 MG capsule  Commonly known as: TESSALON     folic acid 1 MG tablet  Commonly known as: FOLVITE     leucovorin calcium 5 MG tablet  Commonly known as: WELLCOVORIN     lisinopril 30 MG tablet  Commonly known as: PRINIVIL;ZESTRIL     methotrexate 2.5 MG chemo tablet  Commonly known as: RHEUMATREX     Probiotic Acidophilus Beads Caps            STOP taking these medications      hydrOXYzine HCl 10 MG tablet  Commonly known as: ATARAX     OLANZapine 10 MG tablet  Commonly known as: ZYPREXA               Where to Get Your Medications        These medications were sent to [address] [place] [address] Dr. Hahn [phone]  [address] [place] [address], [address] [place] [address]      Phone: [phone]   gabapentin 300 MG capsule  lurasidone 60 MG Tabs tablet  traZODone 50 MG tablet       The patient's chart, MAR and AVS were reviewed by Jet Nolan, [facility].

## 2023-05-18 NOTE — PLAN OF CARE
Problem: Safety - Adult  Goal: Free from fall injury  5/18/2023 0003 by Candance Corwin, RN  Outcome: Progressing     Problem: Depression  Goal: Will be euthymic at discharge  Description: INTERVENTIONS:  1. Administer medication as ordered  2. Provide emotional support via 1:1 interaction with staff  3. Encourage involvement in milieu/groups/activities  4. Monitor for social isolation  Outcome: Progressing     Problem: Anxiety  Goal: Will report anxiety at manageable levels  Description: INTERVENTIONS:  1. Administer medication as ordered  2. Teach and rehearse alternative coping skills  3. Provide emotional support with 1:1 interaction with staff  Outcome: Progressing  Flowsheets (Taken 5/18/2023 0928)  Will report anxiety at manageable levels: Administer medication as ordered  Note: Out on unit engaged w a bright affect, thoughts organized, logical and goal directed. Mood stable, anxiety level is tolerable. Denies SI, no self harming behaviors. Daily goal is to d/c home. Staff focus is on offering support and medication education.

## 2023-05-18 NOTE — INTERDISCIPLINARY ROUNDS
Behavioral Health Interdisciplinary Rounds     Patient Name: Dayday Lewis  Age: 78 y.o. Room/Bed:  [facility]/  Primary Diagnosis: Depression   Admission Status: Voluntary    Readmission within 30 days: no  Power of  in place: no  Patient requires a blocked bed: no            Sleep hours: 6       Participation in Care/Groups:   yes  Medication Compliant?:  yes  PRNS (last 24 hours):  antianxiety     Restraints (last 24 hours):  no  __________________________________________________  OQ Admission Analysis Survey completed:  OQ Admission Analysis Survey score:  OQ Discharge Analysis Survey completed:  OQ Discharge Analysis Survey score:   __________________________________________________     Alcohol screening (AUDIT) completed -     If applicable, date SBIRT discussed in treatment team AND documented:    Tobacco - patient is a smoker:    Illegal Drugs use:      24 hour chart check complete:  yes      _______________________________________________    Patient goal(s) for today: DISCHARGE   Treatment team focus/goals:   Progress note: Pt is stable. Spiritual Care Consult:   Financial concerns/prescription coverage:    Family contact:                        Family requesting physician contact today:    Discharge plan: Pt will return home   Access to weapons : no                                                              Outpatient provider(s):  Dr. Melody Smith   Patient's preferred phone number for follow up call :   Patient's preferred e-mail address :    LOS:  12        Expected LOS: Today     Participating treatment team members: Layo Wade, LUIS Gilliam, Earline Mcnair RN, Dr. Piyush Gil

## 2023-05-18 NOTE — BH NOTE
Behavioral Health Transition Record to Provider    Patient Name: Radha oTrres  YOB: 1955  Medical Record Number: [id number]  Date of Admission: 5/6/2023  Date of Discharge: 5/18/23    Attending Provider: Shanna Salazar MD  Discharging Provider: Dr. Brenton Gann  To contact this individual call [phone] and ask the  to page. If unavailable, ask to be transferred to [facility] Provider on call. [facility] Provider will be available on call 24/7 and during holidays. Primary Care Provider: Courtney Lino MD    Allergies   Allergen Reactions    Codeine Dizziness or Vertigo    Fructose Diarrhea    Lac Bovis Other (See Comments)       Reason for Admission: Radha Torres is a 78 y.o. White (non-) female who is currently admitted to the acute/general side of 7th floor behavioral health Unit at [facility]. She states that she is having difficulty with keeping herself busy throughout the day. She states that she recently moved and wants to unpack boxes, but feels like if she does then she will not have anything else to do. She states that she wants to get back to her normal active life. She presents with thought blocking. She states that she found her medications at the bottom of a trash bag and did not realize that she hadn't been taking them. Per report, she sees Dr. Allen Lancaster who had been in the process of switching her from zyprexa to latuda. She is unable to tell what dose she is taking or how long she has been on them. She states that her medications we doing well previously. She endorses an inpatient hospitalization several months ago for a panic attack. She states that she lives alone. She denies any drug or alcohol use. She denies SI/Hi/AVH currently. Admission Diagnosis: Depression [F32. A]  Depression, unspecified depression type [F32. A]    * No surgery found *    Results for orders placed or performed during the hospital encounter of 05/06/23
Chief Complaint:  \"I think I don't want to do ECT. \"    Length of Stay: 10 Days    Interval History:  05/16/2023  Patient slept well last night, but said that falling asleep was difficult. The team met earlier and recommendation was made to transfer patient to [facility] to mitigate the risk of decompensation, especially because patient lives alone. Laura had a conversation with her brother and friends and is having second thoughts about ECT. She would like to contemplate this option further if her symptoms do not improve. We had a conference call with her brother, Julian Melissa, who expressed some concern regarding ECT and would appreciate a second opinion. He also expressed that it would likely be an undue burden on friends and family if they would have to stay with her around-the-clock upon discharge, in addition to arranging for taking her to ECT and back. Today Laura describes her mood as good and that she continues to improve. She has intermittent episodes of tearfulness, but she is able to deal with those well. She has had no suicidal ideations intents or plans. 05/15/2023  Laura slept well and says that she can see herself improve. She says that she can see a path forward in terms of her coping and has a plan for managing her stressors. She says she will accept the help of her friends. She has no passive death wish. She denies SI.      05/14/2023  Nursing report patient slept 6+ hours. She hadn't requested an PRNs. She says that she is sleeping well. She feels that she is making some small changes in her day and considers that positive. She says that she doesn't feel 'hopeless,' but feels really bored. She feels supported by her friends, whom she told about the ECT. They are going to be supportive and take turns and help her.    05/13/2023  Nursing report no acute events. Summer Barrow says that she slept well last night and is feeling anxiety lifting. She feels her mood is a little brighter today.   She
GROUP THERAPY PROGRESS NOTE    Patient did not participate in Safety Planning group.     Cate Loredo MSW, HP-A
GROUP THERAPY PROGRESS NOTE    Patient is participating in Coping Skills group. Group time: 45 minutes    Personal goal for participation: to gain an understanding defense mechanisms. Goal orientation: Personal    Group therapy participation: Active     Therapeutic interventions reviewed and discussed:  Group members were guided through learning about defense mechanisms. Members were able to develop knowledge of and identify defense mechanisms used to cope with stressors. Handouts provided. Impression of participation:  Pt was present and engaged in group discussion. Pt added insight to group topic. Pt was calm, cooperative.        LUIS Dela Cruz, HP-A
GROUP THERAPY PROGRESS NOTE    Patient is participating in psychotherapy group. Group time: 45 minutes    Personal goal for participation: To gain an understanding of gaslighting. Goal orientation: Personal    Group therapy participation: Active     Therapeutic interventions reviewed and discussed:  Group members were guided through developing an understanding of gaslighting. Pts became aware of what it is, how and by who it is used, and how to respond to it. Handouts provided. Impression of participation:  Pt was present and engaged in group discussion. Pt added insight to topic. Pt interacted with SW and peers. Pt was calm, cooperative.        LUIS Morales, HP-A
GROUP THERAPY PROGRESS NOTE    Patient is participating in psychotherapy group. Group time: 45 minutes    Personal goal for participation: to develop an understanding of cognitive distortions and how to alter our thinking. Goal orientation: Personal    Group therapy participation:  Active     Therapeutic interventions reviewed and discussed:  Group members were guided through learning about cognitive distortions. Members gained an understanding of how these types of distortions effect perception, relationships, and overall mental health. Members were guided through learning about methods that can be used for changing negative thought patterns. Members were able to identify distortions and engage in discussion about past experiences. Handout provided. Impression of participation:  Pt was present and engaged in group discussion. Pt added insight to group topic and asked relevant questions. Pt interacted with SW and peers. Pt was calm, cooperative.        LUIS Payton, QMHP-A
GROUP THERAPY PROGRESS NOTE    Patient is participating in recreational therapy group. Group time: 45 minutes    Personal goal for participation: To create a visual representation of emotions. Goal orientation: Personal    Group therapy participation: active     Therapeutic interventions reviewed and discussed:  Group members were given the opportunity to paint/draw their emotions. Members were challenged to use art as a means to interpret their emotions. Members were able to engage in conversation about their creations and identify with each other's interpretations. Impression of participation: Pt was present and engaged in group activity and discussion. Pt added insight to group topic.  Pt interacted with peers and LUIS Mcclure, QMHP-A
GROUP THERAPY PROGRESS NOTE    Patient is participating in recreational therapy group. Group time: 45 minutes    Personal goal for participation: To engage in survival activity. Goal orientation: Personal    Group therapy participation: Active     Therapeutic interventions reviewed and discussed:  Group members were given the opportunity to engage in the stranded on the moon activity. Members were encouraged to use critical thinking and problem-solving skills. Members interacted with peers and added perception and insight to discussion. Handout provided. Impression of participation: Pt was present and engaged in group activity and discussion. Pt added insight to group topic. Pt was calm, cooperative.        LUIS Auguste, HP-A
GROUP THERAPY PROGRESS NOTE    Pt. actively participated in community group led by nursing students.     Vamshi Lyle, LUIS, [facility]-A
PRN Medication Documentation    Specific patient behavior that led to need for PRN medication: pt c/o anxiety  Staff interventions attempted prior to PRN being given: therapeutic listening and encourage use of coping skills  PRN medication given: Atarax 50mg PO @ 0216  Patient response/effectiveness of PRN medication: effective, pt able to rest w/ NAD
acetaminophen (TYLENOL) tablet 650 mg  650 mg Oral Q4H PRN Felicie Yaron, FNP        polyethylene glycol (GLYCOLAX) packet 17 g  17 g Oral Daily PRN Felicie Yaron, FNP        senna (SENOKOT) tablet 8.6 mg  1 tablet Oral Daily PRN Felicie Yaron, FNP        aluminum & magnesium hydroxide-simethicone (MAALOX) 200-200-20 MG/5ML suspension 30 mL  30 mL Oral Q6H PRN Felicie Yaron, FNP        hydrOXYzine HCl (ATARAX) tablet 10 mg  10 mg Oral TID PRN Felicie Yaron, FNP   10 mg at 05/14/23 1707    haloperidol (HALDOL) tablet 2.5 mg  2.5 mg Oral Q4H PRN Felicie Yaron, FNP        Or    haloperidol lactate (HALDOL) injection 2.5 mg  2.5 mg IntraMUSCular Q4H PRN Felicie Yaron, FNP        diphenhydrAMINE (BENADRYL) injection 25 mg  25 mg IntraMUSCular Q4H PRN Felicie Yaron, FNP        melatonin tablet 3 mg  3 mg Oral Nightly PRN Felicie Yaron, FNP   3 mg at 05/13/23 2108     Mental Status Exam:  72yo WF is fair grooming is engaged in the evaluation. Speech is spontaneous  Mood is \"ok. \"  Affect: euthymic and congruent  No SI   No paranoia, no delusions. Cognition is grossly intact     Physical Exam:  Body habitus: Body mass index is 20.2 kg/m². Musculoskeletal system: normal gait  Tremor - neg  Cog wheeling - neg    Assessment and Plan:  Laura Wade meets criteria for a diagnosis of   BP d/o I, mre depressed, w/o psychosis    05/17/2023  D/c zyprexa   Continue trazodone 50mg po qhs  Continue Latuda 60mg po qDinner  D/C Home Tomorrow. 05/16/2023  Zyprexa 2.5mg po qhs  Start Trazodone 50mg po qhs  Continue Latuda 60mg po qDinner  When improved plan would be to discharge patient home w/f/u w/her psychiatrist Dr. Caio Gasca. Give pt & her family/friends time to research ECT, obtain 2nd opinion.     05/15/2023  Tonight last dose of zyprexa 5mg po qhs  Continue latuda 60 qDinner  Continue gabapentin 300mg po qhs  Will provide for trazodone 100mg po qhs prn  Plan d/c tomorrow with start of ECT at
zyprexa. Continue latuda 60 qDinner  Continue gabapentin 300mg po qhs  Will provide for trazodone 100mg po qhs prn  Plan d/c tomorrow with start of ECT at [facility] on Wednesday with coordination with Dr. Kushal Ferris team.  Returned call to her brother, Micaela Gasca, for a status update and answered questions about her case and ECT.      05/14/2023  Continue latuda 60mg po qDinner  Continue gabapentin 300mg po qhs  Decrease zyprexa from 7.5 to 5mg po qhs. Patient is interested in outpatient ECT, will set up, anticipate d/c home next week. 05/13/2023  Continue latuda 60mg po qDinner  Increase gabapentin from 200 to 300mg po qhs  Decrease zyprexa from 10 to 7.5mg po qhs. Patient is interested in outpatient ECT, will set up, anticipate d/c home next week. 05/12/2023  Increase latuda from 40 to 60mg po qDinner. Continue zyprexa 10mg po qhs for now. Continue gabapentin 200mg po qhs. Will reach out to [facility] for ECT availability. 05/11/2023  Increase zyprexa from 5 to 10mg po qhs. Continue latuda 40mg po qDinner  Augment with gabapentin 200mg po qhs    05/10/2023  Will continue zyprexa 5mg po qhs for now. Continue latuda 40mg po qDinner. 05/09/2023  Restart Zyprexa 5mg po qhs for now. Continue latuda 40mg po, but will switch to dinner time. 05/08/2023  D/C Zyprexa 10mg po qhs. Increase latuda from 20mg po qday to 40mg po qday. Will give one dose of latuda 20mg now    A coordinated, multidisplinary treatment team round was conducted with the patient, nurses, pharmcist,  and writer present. Discussions held with , and/or with family members; Complete current electronic health record for patient was reviewed in full including consultant notes, ancillary staff notes, nurses and tech notes, labs and vitals.     I certify that this patients inpatient psychiatric hospital services furnished since the previous certification were, and continue to be, required for treatment that could reasonably be

## 2023-06-10 PROBLEM — F31.9 BIPOLAR DISORDER (HCC): Status: ACTIVE | Noted: 2023-06-10

## 2023-06-17 ENCOUNTER — HOSPITAL ENCOUNTER (INPATIENT)
Facility: HOSPITAL | Age: 68
LOS: 27 days | Discharge: HOME | DRG: 885 | End: 2023-07-14
Attending: PSYCHIATRY & NEUROLOGY | Admitting: PSYCHIATRY & NEUROLOGY
Payer: MEDICARE

## 2023-06-17 DIAGNOSIS — F31.75 BIPOLAR DISORDER, IN PARTIAL REMISSION, MOST RECENT EPISODE DEPRESSED (HCC): Primary | ICD-10-CM

## 2023-06-17 PROCEDURE — 6370000000 HC RX 637 (ALT 250 FOR IP): Performed by: NURSE PRACTITIONER

## 2023-06-17 PROCEDURE — 6370000000 HC RX 637 (ALT 250 FOR IP): Performed by: PSYCHIATRY & NEUROLOGY

## 2023-06-17 PROCEDURE — 1240000000 HC EMOTIONAL WELLNESS R&B

## 2023-06-17 RX ORDER — HALOPERIDOL 5 MG/ML
2.5 INJECTION INTRAMUSCULAR EVERY 4 HOURS PRN
Status: CANCELLED | OUTPATIENT
Start: 2023-06-17

## 2023-06-17 RX ORDER — DIPHENHYDRAMINE HYDROCHLORIDE 50 MG/ML
25 INJECTION INTRAMUSCULAR; INTRAVENOUS EVERY 4 HOURS PRN
Status: DISCONTINUED | OUTPATIENT
Start: 2023-06-17 | End: 2023-07-14 | Stop reason: HOSPADM

## 2023-06-17 RX ORDER — POLYETHYLENE GLYCOL 3350 17 G/17G
17 POWDER, FOR SOLUTION ORAL DAILY PRN
Status: DISCONTINUED | OUTPATIENT
Start: 2023-06-17 | End: 2023-06-17

## 2023-06-17 RX ORDER — MAGNESIUM HYDROXIDE/ALUMINUM HYDROXICE/SIMETHICONE 120; 1200; 1200 MG/30ML; MG/30ML; MG/30ML
30 SUSPENSION ORAL EVERY 6 HOURS PRN
Status: CANCELLED | OUTPATIENT
Start: 2023-06-17

## 2023-06-17 RX ORDER — MAGNESIUM HYDROXIDE/ALUMINUM HYDROXICE/SIMETHICONE 120; 1200; 1200 MG/30ML; MG/30ML; MG/30ML
30 SUSPENSION ORAL EVERY 6 HOURS PRN
Status: DISCONTINUED | OUTPATIENT
Start: 2023-06-17 | End: 2023-06-17

## 2023-06-17 RX ORDER — TRAZODONE HYDROCHLORIDE 50 MG/1
50 TABLET ORAL NIGHTLY PRN
Status: DISCONTINUED | OUTPATIENT
Start: 2023-06-17 | End: 2023-06-17

## 2023-06-17 RX ORDER — POLYETHYLENE GLYCOL 3350 17 G/17G
17 POWDER, FOR SOLUTION ORAL DAILY PRN
Status: CANCELLED | OUTPATIENT
Start: 2023-06-17

## 2023-06-17 RX ORDER — DIPHENHYDRAMINE HYDROCHLORIDE 50 MG/ML
50 INJECTION INTRAMUSCULAR; INTRAVENOUS EVERY 4 HOURS PRN
Status: DISCONTINUED | OUTPATIENT
Start: 2023-06-17 | End: 2023-06-17

## 2023-06-17 RX ORDER — ACETAMINOPHEN 325 MG/1
650 TABLET ORAL EVERY 4 HOURS PRN
Status: DISCONTINUED | OUTPATIENT
Start: 2023-06-17 | End: 2023-06-17

## 2023-06-17 RX ORDER — MIRTAZAPINE 15 MG/1
15 TABLET, FILM COATED ORAL NIGHTLY
Status: DISCONTINUED | OUTPATIENT
Start: 2023-06-17 | End: 2023-06-20

## 2023-06-17 RX ORDER — HYDROXYZINE HYDROCHLORIDE 25 MG/1
50 TABLET, FILM COATED ORAL 3 TIMES DAILY PRN
Status: DISCONTINUED | OUTPATIENT
Start: 2023-06-17 | End: 2023-06-17

## 2023-06-17 RX ORDER — HYDROXYZINE HYDROCHLORIDE 10 MG/1
10 TABLET, FILM COATED ORAL 3 TIMES DAILY PRN
Status: DISCONTINUED | OUTPATIENT
Start: 2023-06-17 | End: 2023-07-04

## 2023-06-17 RX ORDER — LANOLIN ALCOHOL/MO/W.PET/CERES
3 CREAM (GRAM) TOPICAL NIGHTLY PRN
Status: DISCONTINUED | OUTPATIENT
Start: 2023-06-17 | End: 2023-06-18

## 2023-06-17 RX ORDER — LEUCOVORIN CALCIUM 5 MG/1
5 TABLET ORAL DAILY
Status: DISCONTINUED | OUTPATIENT
Start: 2023-06-17 | End: 2023-06-19

## 2023-06-17 RX ORDER — MAGNESIUM HYDROXIDE/ALUMINUM HYDROXICE/SIMETHICONE 120; 1200; 1200 MG/30ML; MG/30ML; MG/30ML
30 SUSPENSION ORAL EVERY 6 HOURS PRN
Status: DISCONTINUED | OUTPATIENT
Start: 2023-06-17 | End: 2023-07-14 | Stop reason: HOSPADM

## 2023-06-17 RX ORDER — HALOPERIDOL 5 MG/1
5 TABLET ORAL EVERY 4 HOURS PRN
Status: DISCONTINUED | OUTPATIENT
Start: 2023-06-17 | End: 2023-06-17

## 2023-06-17 RX ORDER — LORAZEPAM 0.5 MG/1
0.5 TABLET ORAL 2 TIMES DAILY PRN
Status: DISCONTINUED | OUTPATIENT
Start: 2023-06-17 | End: 2023-07-14 | Stop reason: HOSPADM

## 2023-06-17 RX ORDER — FOLIC ACID 1 MG/1
3 TABLET ORAL DAILY
Status: DISCONTINUED | OUTPATIENT
Start: 2023-06-17 | End: 2023-07-14 | Stop reason: HOSPADM

## 2023-06-17 RX ORDER — HALOPERIDOL 5 MG/1
2.5 TABLET ORAL EVERY 4 HOURS PRN
Status: CANCELLED | OUTPATIENT
Start: 2023-06-17

## 2023-06-17 RX ORDER — HALOPERIDOL 5 MG/ML
5 INJECTION INTRAMUSCULAR EVERY 4 HOURS PRN
Status: DISCONTINUED | OUTPATIENT
Start: 2023-06-17 | End: 2023-06-17

## 2023-06-17 RX ORDER — HYDROXYZINE HYDROCHLORIDE 10 MG/1
10 TABLET, FILM COATED ORAL 3 TIMES DAILY PRN
Status: CANCELLED | OUTPATIENT
Start: 2023-06-17

## 2023-06-17 RX ORDER — POLYETHYLENE GLYCOL 3350 17 G/17G
17 POWDER, FOR SOLUTION ORAL DAILY PRN
Status: DISCONTINUED | OUTPATIENT
Start: 2023-06-17 | End: 2023-07-05

## 2023-06-17 RX ORDER — LANOLIN ALCOHOL/MO/W.PET/CERES
3 CREAM (GRAM) TOPICAL NIGHTLY PRN
Status: CANCELLED | OUTPATIENT
Start: 2023-06-17

## 2023-06-17 RX ORDER — ACETAMINOPHEN 325 MG/1
650 TABLET ORAL EVERY 4 HOURS PRN
Status: CANCELLED | OUTPATIENT
Start: 2023-06-17

## 2023-06-17 RX ORDER — ACETAMINOPHEN 325 MG/1
650 TABLET ORAL EVERY 4 HOURS PRN
Status: DISCONTINUED | OUTPATIENT
Start: 2023-06-17 | End: 2023-07-14 | Stop reason: HOSPADM

## 2023-06-17 RX ORDER — HALOPERIDOL 5 MG/ML
2.5 INJECTION INTRAMUSCULAR EVERY 4 HOURS PRN
Status: DISCONTINUED | OUTPATIENT
Start: 2023-06-17 | End: 2023-07-14 | Stop reason: HOSPADM

## 2023-06-17 RX ORDER — HALOPERIDOL 5 MG/1
2.5 TABLET ORAL EVERY 4 HOURS PRN
Status: DISCONTINUED | OUTPATIENT
Start: 2023-06-17 | End: 2023-07-14 | Stop reason: HOSPADM

## 2023-06-17 RX ORDER — LURASIDONE HYDROCHLORIDE 20 MG/1
20 TABLET, FILM COATED ORAL
Status: DISCONTINUED | OUTPATIENT
Start: 2023-06-17 | End: 2023-06-19

## 2023-06-17 RX ORDER — DIPHENHYDRAMINE HYDROCHLORIDE 50 MG/ML
25 INJECTION INTRAMUSCULAR; INTRAVENOUS EVERY 4 HOURS PRN
Status: CANCELLED | OUTPATIENT
Start: 2023-06-17

## 2023-06-17 RX ADMIN — LISINOPRIL 30 MG: 5 TABLET ORAL at 11:13

## 2023-06-17 RX ADMIN — LEUCOVORIN CALCIUM 5 MG: 5 TABLET ORAL at 13:26

## 2023-06-17 RX ADMIN — MIRTAZAPINE 15 MG: 15 TABLET, FILM COATED ORAL at 20:51

## 2023-06-17 RX ADMIN — FOLIC ACID 3 MG: 1 TABLET ORAL at 11:14

## 2023-06-17 RX ADMIN — Medication 3 MG: at 20:50

## 2023-06-17 RX ADMIN — LURASIDONE HYDROCHLORIDE 20 MG: 20 TABLET, FILM COATED ORAL at 17:53

## 2023-06-17 RX ADMIN — LORAZEPAM 0.5 MG: 0.5 TABLET ORAL at 13:16

## 2023-06-17 RX ADMIN — HYDROXYZINE HYDROCHLORIDE 10 MG: 10 TABLET ORAL at 17:52

## 2023-06-17 ASSESSMENT — LIFESTYLE VARIABLES
HOW MANY STANDARD DRINKS CONTAINING ALCOHOL DO YOU HAVE ON A TYPICAL DAY: PATIENT DOES NOT DRINK
HOW OFTEN DO YOU HAVE A DRINK CONTAINING ALCOHOL: NEVER

## 2023-06-17 ASSESSMENT — SLEEP AND FATIGUE QUESTIONNAIRES
DO YOU HAVE DIFFICULTY SLEEPING: YES
SLEEP PATTERN: INSOMNIA
AVERAGE NUMBER OF SLEEP HOURS: 4
DO YOU USE A SLEEP AID: NO

## 2023-06-17 ASSESSMENT — PAIN SCALES - GENERAL
PAINLEVEL_OUTOF10: 0

## 2023-06-17 NOTE — PLAN OF CARE
1755 Atarax 10 mg given PO for anxiety at this time. 1320 Ativan 0.5 mg given PO at this time for anxiety. 0830 Patient in bed awake and alert, oriented X . No sign and symptoms of distress or discomfort noted. No complain of pain. Denied any SI or A/V hallucinations at this time. Compliant with assessment and med pass. Makes eye contact. Vital signs within normal parameters. Will continue to monitor. Problem: Self Harm/Suicidality  Goal: Will have no self-injury during hospital stay  Description: INTERVENTIONS:  1. Ensure constant observer at bedside with Q15M safety checks  2. Maintain a safe environment  3. Secure patient belongings  4. Ensure family/visitors adhere to safety recommendations  5. Ensure safety tray has been added to patient's diet order  6. Every shift and PRN: Re-assess suicidal risk via Frequent Screener    6/17/2023 0959 by Marbin Chavez RN  Outcome: Progressing  6/17/2023 0229 by Ariana Morgan RN  Outcome: Progressing     Problem: Depression  Goal: Will be euthymic at discharge  Description: INTERVENTIONS:  1. Administer medication as ordered  2. Provide emotional support via 1:1 interaction with staff  3. Encourage involvement in milieu/groups/activities  4. Monitor for social isolation  Outcome: Progressing     Problem: Behavior  Goal: Pt/Family maintain appropriate behavior and adhere to behavioral management agreement, if implemented  Description: INTERVENTIONS:  1. Assess patient/family's coping skills and  non-compliant behavior (including use of illegal substances)  2. Notify security of behavior or suspected illegal substances which indicate the need for search of the family and/or belongings  3. Encourage verbalization of thoughts and concerns in a socially appropriate manner  4. Utilize positive, consistent limit setting strategies supporting safety of patient, staff and others  5.  Encourage participation in the decision making process about the behavioral

## 2023-06-17 NOTE — H&P
[facility] HISTORY AND PHYSICAL    Name:  Ashkan Escudero  MR#:  [id number]  :  1955  ACCOUNT #:  [de-identified]  ADMIT DATE:  2023    INITIAL PSYCHIATRIC INTERVIEW    CHIEF COMPLAINT:  \"My depression is not getting any better. \"    HISTORY OF PRESENT ILLNESS:  The patient is a 41-year-old  female who is currently admitted after being transferred to this hospital from [facility].  She has had several hospitalizations there recently for depression that has not responded to multiple medications. She was transferred to us with the hope that she can get into the ECT program here. She reports that she has been depressed since the beginning of this year and thinks that this is when her mental illness started. She states that she had a what she describes as a manic episode in 2023, and following that she has gone into a depression from which she has struggled to improve. States that she is feeling hopeless, feels depressed almost everyday, struggles with her sleep and has little energy or motivation. Notes that she has been compliant with taking her medications and has tried multiple different antidepressants and augmenting agents recently. This has included taking Lexapro, mirtazapine, Prozac, Seroquel, Latuda, gabapentin as well as lorazepam.  We discussed the procedure of ECT, the adverse effects and benefits and she is agreeable to trying it. Plain CT workup has been mostly completed. There is a normal EKG and normal chest x-ray. She is agreeable to signing informed consent to have ECT which might be started on Monday or Wednesday. She still feels hopeless and depressed and has passive thoughts of suicide. Denies any psychotic symptoms. PAST MEDICAL HISTORY:  Reviewed, as per the history and physical exam.      No past medical history on file. Prior to Admission medications    Medication Sig Start Date End Date Taking?  Authorizing Provider

## 2023-06-17 NOTE — PLAN OF CARE
Problem: Self Harm/Suicidality  Goal: Will have no self-injury during hospital stay  Description: INTERVENTIONS:  1. Ensure constant observer at bedside with Q15M safety checks  2. Maintain a safe environment  3. Secure patient belongings  4. Ensure family/visitors adhere to safety recommendations  5. Ensure safety tray has been added to patient's diet order  6. Every shift and PRN: Re-assess suicidal risk via Frequent Screener    Outcome: Progressing     Problem: Behavior  Goal: Pt/Family maintain appropriate behavior and adhere to behavioral management agreement, if implemented  Description: INTERVENTIONS:  1. Assess patient/family's coping skills and  non-compliant behavior (including use of illegal substances)  2. Notify security of behavior or suspected illegal substances which indicate the need for search of the family and/or belongings  3. Encourage verbalization of thoughts and concerns in a socially appropriate manner  4. Utilize positive, consistent limit setting strategies supporting safety of patient, staff and others  5. Encourage participation in the decision making process about the behavioral management agreement  6. If a visitor's behavior poses a threat to safety call refer to organization policy. 7. Initiate consult with , Psychosocial CNS, Spiritual Care as appropriate  Outcome: Progressing     Problem: Anxiety  Goal: Will report anxiety at manageable levels  Description: INTERVENTIONS:  1. Administer medication as ordered  2. Teach and rehearse alternative coping skills  3. Provide emotional support with 1:1 interaction with staff  Outcome: Progressing     Problem: Involuntary Admit  Goal: Will cooperate with staff recommendations and doctor's orders and will demonstrate appropriate behavior  Description: INTERVENTIONS:  1. Treat underlying conditions and offer medication as ordered  2. Educate regarding involuntary admission procedures and rules  3.  Contain excessive/inappropriate behavior per unit and hospital policies  Outcome: Progressing

## 2023-06-17 NOTE — BH NOTE
ADMISSION NOTE:   78year old white female,admitted to the unit  at 0130 am with a primary diagnosis of Bipolar disorder and depression,admitted for ECT.patient admitted from [facility] ED as a voluntary patient on a wheelchair though patient can ambulate with steady gait. Patient is alert and oriented x 4,calm and cooperative with the admission process and speech is clear and coherent. UDS negative,BAL<10. Patient  dual skin check done with Ammy RN,Patient noted with multiple abrasion with a sutured cut wound on the L. Forearm with a dressing on  with no drainage, observed. Pt belonging checked for contrabands and secured. Wrist watch secured with security. Admission packet and confidentiality code given. Patient oriented to the unit. Q 15 minute check initiated and monitored for safety by staff.

## 2023-06-17 NOTE — PLAN OF CARE
Problem: Self Harm/Suicidality  Goal: Will have no self-injury during hospital stay  Description: INTERVENTIONS:  1. Ensure constant observer at bedside with Q15M safety checks  2. Maintain a safe environment  3. Secure patient belongings  4. Ensure family/visitors adhere to safety recommendations  5. Ensure safety tray has been added to patient's diet order  6. Every shift and PRN: Re-assess suicidal risk via Frequent Screener    6/17/2023 0229 by Rakesh Felton RN  Outcome: Progressing     Problem: Behavior  Goal: Pt/Family maintain appropriate behavior and adhere to behavioral management agreement, if implemented  Description: INTERVENTIONS:  1. Assess patient/family's coping skills and  non-compliant behavior (including use of illegal substances)  2. Notify security of behavior or suspected illegal substances which indicate the need for search of the family and/or belongings  3. Encourage verbalization of thoughts and concerns in a socially appropriate manner  4. Utilize positive, consistent limit setting strategies supporting safety of patient, staff and others  5. Encourage participation in the decision making process about the behavioral management agreement  6. If a visitor's behavior poses a threat to safety call refer to organization policy. 7. Initiate consult with , Psychosocial CNS, Spiritual Care as appropriate  6/17/2023 0229 by Rakesh Felton RN  Outcome: Progressing     Problem: Anxiety  Goal: Will report anxiety at manageable levels  Description: INTERVENTIONS:  1. Administer medication as ordered  2. Teach and rehearse alternative coping skills  3. Provide emotional support with 1:1 interaction with staff  6/17/2023 0229 by Rakesh Felton RN  Outcome: Progressing     Problem: Involuntary Admit  Goal: Will cooperate with staff recommendations and doctor's orders and will demonstrate appropriate behavior  Description: INTERVENTIONS:  1.  Treat underlying

## 2023-06-18 PROCEDURE — 1240000000 HC EMOTIONAL WELLNESS R&B

## 2023-06-18 PROCEDURE — 6370000000 HC RX 637 (ALT 250 FOR IP): Performed by: PSYCHIATRY & NEUROLOGY

## 2023-06-18 RX ORDER — LANOLIN ALCOHOL/MO/W.PET/CERES
9 CREAM (GRAM) TOPICAL NIGHTLY PRN
Status: DISCONTINUED | OUTPATIENT
Start: 2023-06-18 | End: 2023-07-14 | Stop reason: HOSPADM

## 2023-06-18 RX ADMIN — LEUCOVORIN CALCIUM 5 MG: 5 TABLET ORAL at 09:45

## 2023-06-18 RX ADMIN — LISINOPRIL 30 MG: 5 TABLET ORAL at 08:59

## 2023-06-18 RX ADMIN — FOLIC ACID 3 MG: 1 TABLET ORAL at 08:59

## 2023-06-18 RX ADMIN — LURASIDONE HYDROCHLORIDE 20 MG: 20 TABLET, FILM COATED ORAL at 17:04

## 2023-06-18 RX ADMIN — Medication 9 MG: at 21:28

## 2023-06-18 RX ADMIN — MIRTAZAPINE 15 MG: 15 TABLET, FILM COATED ORAL at 21:28

## 2023-06-18 RX ADMIN — LORAZEPAM 0.5 MG: 0.5 TABLET ORAL at 12:44

## 2023-06-18 ASSESSMENT — PAIN SCALES - GENERAL
PAINLEVEL_OUTOF10: 0
PAINLEVEL_OUTOF10: 0

## 2023-06-18 NOTE — PLAN OF CARE
Problem: Self Harm/Suicidality  Goal: Will have no self-injury during hospital stay  Description: INTERVENTIONS:  1. Ensure constant observer at bedside with Q15M safety checks  2. Maintain a safe environment  3. Secure patient belongings  4. Ensure family/visitors adhere to safety recommendations  5. Ensure safety tray has been added to patient's diet order  6. Every shift and PRN: Re-assess suicidal risk via Frequent Screener    6/17/2023 2227 by Mirna Aldana RN  Outcome: Progressing  6/17/2023 0959 by Ricardo Kuhn RN  Outcome: Progressing     Problem: Depression  Goal: Will be euthymic at discharge  Description: INTERVENTIONS:  1. Administer medication as ordered  2. Provide emotional support via 1:1 interaction with staff  3. Encourage involvement in milieu/groups/activities  4. Monitor for social isolation  6/17/2023 0959 by iRcardo Kuhn RN  Outcome: Progressing     Problem: Psychosis  Goal: Will report no hallucinations or delusions  Description: INTERVENTIONS:  1. Administer medication as  ordered  2. Assist with reality testing to support increasing orientation  3.  Assess if patient's hallucinations or delusions are encouraging self harm or harm to others and intervene as appropriate  Outcome: Progressing     Problem: Safety - Adult  Goal: Free from fall injury  Outcome: Progressing

## 2023-06-18 NOTE — BH NOTE
Pt alert and oriented x 4, calm and cooperative with the assessment process,known hypertensive pt on medication,BP was outside defined limit. Patient appeared sad,with a dull flat affect and depressed mood. Pt reported having anxiety and depression at 6,denied SI/HI/AVH and pain the patient complied with medication and melatonin was given for insomnia. Q 15 checks monitored for safety. Pt slept for 8 hrs.

## 2023-06-18 NOTE — BH NOTE
Chief Complaint:  I hardly slept last night. Length of Stay: 1 Days    Interval History:  Ms. Gayr Crain is doing poorly. Says she barely slept last night and woke up feeling \"horrible\". Describes her mood as hopeless and has passive thoughts of dying. She remains isolative to her room and no energy or motivation. Calm and cooperative during the interview. Remains motivated to get started on ECT. At the present time the patient Laura Wade remains compliant with taking medications. Denies any adverse events from taking them and feels they have been beneficial.       Past Medical History:  No past medical history on file.         Labs:  Lab Results   Component Value Date/Time    WBC 4.9 06/10/2023 12:00 PM    HGB 13.3 06/10/2023 12:00 PM    HCT 40.5 06/10/2023 12:00 PM     06/10/2023 12:00 PM    MCV 98.8 06/10/2023 12:00 PM      Lab Results   Component Value Date/Time     06/10/2023 12:00 PM    K 3.6 06/10/2023 12:00 PM     06/10/2023 12:00 PM    CO2 31 06/10/2023 12:00 PM    BUN 23 06/10/2023 12:00 PM    GLOB 3.3 06/10/2023 12:00 PM    ALT 20 06/10/2023 12:00 PM      [unfilled]      Current Facility-Administered Medications   Medication Dose Route Frequency Provider Last Rate Last Admin    acetaminophen (TYLENOL) tablet 650 mg  650 mg Oral Q4H PRN JENN Tucker NP        polyethylene glycol (GLYCOLAX) packet 17 g  17 g Oral Daily PRN JENN Tucker NP        aluminum & magnesium hydroxide-simethicone (MAALOX) 200-200-20 MG/5ML suspension 30 mL  30 mL Oral Q6H PRN JENN Tucker NP        hydrOXYzine HCl (ATARAX) tablet 10 mg  10 mg Oral TID PRN JENN Richardson NP   10 mg at 06/17/23 1752    haloperidol (HALDOL) tablet 2.5 mg  2.5 mg Oral Q4H PRN JENN Tucker NP        Or    haloperidol lactate (HALDOL) injection 2.5 mg  2.5 mg IntraMUSCular Q4H PRN JENN Tucker NP        diphenhydrAMINE (BENADRYL) injection 25 mg

## 2023-06-18 NOTE — PLAN OF CARE
Problem: Behavior  Goal: Pt/Family maintain appropriate behavior and adhere to behavioral management agreement, if implemented  Description: INTERVENTIONS:  1. Assess patient/family's coping skills and  non-compliant behavior (including use of illegal substances)  2. Notify security of behavior or suspected illegal substances which indicate the need for search of the family and/or belongings  3. Encourage verbalization of thoughts and concerns in a socially appropriate manner  4. Utilize positive, consistent limit setting strategies supporting safety of patient, staff and others  5. Encourage participation in the decision making process about the behavioral management agreement  6. If a visitor's behavior poses a threat to safety call refer to organization policy. 7. Initiate consult with , Psychosocial CNS, Spiritual Care as appropriate  Outcome: Progressing    Assumed care of patient. Met patient dayroom. Patient appears anxious. Patient calm and cooperative during assessment. Denied anxiety, depression, SI, HI, and AVH. No complaints of pain. Medication and meal compliant. Patient visible on unit. In dayroom reading a book and playing games with peers. PRN Ativan given at 1244. Patient reports increased anxiety due to ECT. Writer to reassure patient and explain procedure. Patient states that medication was effective.

## 2023-06-19 ENCOUNTER — ANESTHESIA (OUTPATIENT)
Facility: HOSPITAL | Age: 68
End: 2023-06-19
Payer: MEDICARE

## 2023-06-19 ENCOUNTER — ANESTHESIA EVENT (OUTPATIENT)
Facility: HOSPITAL | Age: 68
End: 2023-06-19
Payer: MEDICARE

## 2023-06-19 PROCEDURE — 3700000000 HC ANESTHESIA ATTENDED CARE: Performed by: PSYCHIATRY & NEUROLOGY

## 2023-06-19 PROCEDURE — 7100000001 HC PACU RECOVERY - ADDTL 15 MIN: Performed by: PSYCHIATRY & NEUROLOGY

## 2023-06-19 PROCEDURE — 1240000000 HC EMOTIONAL WELLNESS R&B

## 2023-06-19 PROCEDURE — 7100000000 HC PACU RECOVERY - FIRST 15 MIN: Performed by: PSYCHIATRY & NEUROLOGY

## 2023-06-19 PROCEDURE — GZB1ZZZ ELECTROCONVULSIVE THERAPY, UNILATERAL-MULTIPLE SEIZURE: ICD-10-PCS | Performed by: PSYCHIATRY & NEUROLOGY

## 2023-06-19 PROCEDURE — 3600000002 HC SURGERY LEVEL 2 BASE: Performed by: PSYCHIATRY & NEUROLOGY

## 2023-06-19 PROCEDURE — 6370000000 HC RX 637 (ALT 250 FOR IP): Performed by: NURSE PRACTITIONER

## 2023-06-19 PROCEDURE — 90870 ELECTROCONVULSIVE THERAPY: CPT | Performed by: PSYCHIATRY & NEUROLOGY

## 2023-06-19 PROCEDURE — 7100000011 HC PHASE II RECOVERY - ADDTL 15 MIN: Performed by: PSYCHIATRY & NEUROLOGY

## 2023-06-19 PROCEDURE — 6370000000 HC RX 637 (ALT 250 FOR IP): Performed by: PSYCHIATRY & NEUROLOGY

## 2023-06-19 PROCEDURE — 6360000002 HC RX W HCPCS: Performed by: ANESTHESIOLOGY

## 2023-06-19 PROCEDURE — 7100000010 HC PHASE II RECOVERY - FIRST 15 MIN: Performed by: PSYCHIATRY & NEUROLOGY

## 2023-06-19 PROCEDURE — 2500000003 HC RX 250 WO HCPCS: Performed by: ANESTHESIOLOGY

## 2023-06-19 PROCEDURE — 2709999900 HC NON-CHARGEABLE SUPPLY: Performed by: PSYCHIATRY & NEUROLOGY

## 2023-06-19 PROCEDURE — GZB4ZZZ OTHER ELECTROCONVULSIVE THERAPY: ICD-10-PCS | Performed by: PSYCHIATRY & NEUROLOGY

## 2023-06-19 RX ORDER — SODIUM CHLORIDE 0.9 % (FLUSH) 0.9 %
5-40 SYRINGE (ML) INJECTION PRN
Status: DISCONTINUED | OUTPATIENT
Start: 2023-06-19 | End: 2023-06-19 | Stop reason: HOSPADM

## 2023-06-19 RX ORDER — SODIUM CHLORIDE 0.9 % (FLUSH) 0.9 %
5-40 SYRINGE (ML) INJECTION PRN
Status: CANCELLED | OUTPATIENT
Start: 2023-06-19

## 2023-06-19 RX ORDER — SUCCINYLCHOLINE/SOD CL,ISO/PF 100 MG/5ML
SYRINGE (ML) INTRAVENOUS PRN
Status: DISCONTINUED | OUTPATIENT
Start: 2023-06-19 | End: 2023-06-19 | Stop reason: SDUPTHER

## 2023-06-19 RX ORDER — LEUCOVORIN CALCIUM 5 MG/1
5 TABLET ORAL
Status: DISCONTINUED | OUTPATIENT
Start: 2023-06-22 | End: 2023-06-19

## 2023-06-19 RX ORDER — LEUCOVORIN CALCIUM 5 MG/1
5 TABLET ORAL
Status: DISCONTINUED | OUTPATIENT
Start: 2023-06-22 | End: 2023-07-14 | Stop reason: HOSPADM

## 2023-06-19 RX ORDER — SODIUM CHLORIDE 0.9 % (FLUSH) 0.9 %
5-40 SYRINGE (ML) INJECTION EVERY 12 HOURS SCHEDULED
Status: CANCELLED | OUTPATIENT
Start: 2023-06-19

## 2023-06-19 RX ORDER — SODIUM CHLORIDE 9 MG/ML
INJECTION, SOLUTION INTRAVENOUS PRN
Status: DISCONTINUED | OUTPATIENT
Start: 2023-06-19 | End: 2023-06-19 | Stop reason: HOSPADM

## 2023-06-19 RX ORDER — SODIUM CHLORIDE 0.9 % (FLUSH) 0.9 %
5-40 SYRINGE (ML) INJECTION EVERY 12 HOURS SCHEDULED
Status: DISCONTINUED | OUTPATIENT
Start: 2023-06-19 | End: 2023-06-19 | Stop reason: HOSPADM

## 2023-06-19 RX ORDER — SODIUM CHLORIDE 9 MG/ML
INJECTION, SOLUTION INTRAVENOUS CONTINUOUS
Status: DISCONTINUED | OUTPATIENT
Start: 2023-06-19 | End: 2023-06-19 | Stop reason: HOSPADM

## 2023-06-19 RX ORDER — LURASIDONE HYDROCHLORIDE 40 MG/1
40 TABLET, FILM COATED ORAL
Status: DISCONTINUED | OUTPATIENT
Start: 2023-06-20 | End: 2023-07-14 | Stop reason: HOSPADM

## 2023-06-19 RX ORDER — SODIUM CHLORIDE 9 MG/ML
INJECTION, SOLUTION INTRAVENOUS PRN
Status: CANCELLED | OUTPATIENT
Start: 2023-06-19

## 2023-06-19 RX ADMIN — LURASIDONE HYDROCHLORIDE 20 MG: 20 TABLET, FILM COATED ORAL at 17:57

## 2023-06-19 RX ADMIN — LEUCOVORIN CALCIUM 5 MG: 5 TABLET ORAL at 09:43

## 2023-06-19 RX ADMIN — HYDROXYZINE HYDROCHLORIDE 10 MG: 10 TABLET ORAL at 15:08

## 2023-06-19 RX ADMIN — Medication 9 MG: at 20:38

## 2023-06-19 RX ADMIN — MIRTAZAPINE 15 MG: 15 TABLET, FILM COATED ORAL at 20:38

## 2023-06-19 RX ADMIN — FOLIC ACID 3 MG: 1 TABLET ORAL at 09:44

## 2023-06-19 RX ADMIN — LISINOPRIL 30 MG: 5 TABLET ORAL at 09:44

## 2023-06-19 RX ADMIN — Medication 50 MG: at 08:21

## 2023-06-19 RX ADMIN — Medication 40 MG: at 08:21

## 2023-06-19 RX ADMIN — HYDROXYZINE HYDROCHLORIDE 10 MG: 10 TABLET ORAL at 20:37

## 2023-06-19 RX ADMIN — ACETAMINOPHEN 650 MG: 325 TABLET ORAL at 09:59

## 2023-06-19 ASSESSMENT — PAIN - FUNCTIONAL ASSESSMENT
PAIN_FUNCTIONAL_ASSESSMENT: NONE - DENIES PAIN
PAIN_FUNCTIONAL_ASSESSMENT: NONE - DENIES PAIN
PAIN_FUNCTIONAL_ASSESSMENT: ACTIVITIES ARE NOT PREVENTED
PAIN_FUNCTIONAL_ASSESSMENT: NONE - DENIES PAIN

## 2023-06-19 ASSESSMENT — PAIN SCALES - GENERAL
PAINLEVEL_OUTOF10: 0
PAINLEVEL_OUTOF10: 3
PAINLEVEL_OUTOF10: 0

## 2023-06-19 ASSESSMENT — PAIN DESCRIPTION - DESCRIPTORS: DESCRIPTORS: ACHING

## 2023-06-19 ASSESSMENT — PAIN DESCRIPTION - LOCATION: LOCATION: HEAD

## 2023-06-19 NOTE — GROUP NOTE
Group Therapy Note    Date: 6/19/2023    Group Start Time: 0845  Group End Time: 2118  Group Topic: Community Meeting    [address] [facility]        Group Therapy Note    Attendees: 7     Did not attend    Signature:  Jacque Loza

## 2023-06-19 NOTE — PROGRESS NOTES
Report received from AnnabelRN, pt to surgical suite for ECT. Pt returned to the unit, vitals completed, pt ambulatory without assistance. Upon assessment, pt denies SI/HI and AVH. Pt endorsed having a headache, received prn tylenol. Pt has been visible on the unit and interacting with peers. Pt ate minimally at meals. Pt has taken her scheduled medication without issues. RN rounds in place, plan of care to continue. Pt complained of feeling anxious, received prn atarax, pt remains visible on the unit and social with peers. Pt ate poorly through the say, stated she was not hungry. Pt remained visible on the unit for the remainder of the day.

## 2023-06-19 NOTE — WOUND CARE
WOUND Note:     New consult for self inflicted lacerations on Left forearm    Chart shows:  Transferred from [facility] for ECT treatment for severe Depression that is resistant to most medications on 6/17/23  History of recent onset of Bipolar disorder    Assessment:   A&O x 4 and reports no pain   Independent and continent. Patient was in dayroom playing board game with other residents. Calm and cooperative. 1. POA self inflicted lacerations to LFA  Multiple lacerations starting on inner wrist to about 2 inches from antecubital area. Patient has about 3 areas with intact sutures. The areas look superficial at this time and are healing w/out s/sx of infection. Patient's skin is thin and fragile. TX: cleansed wound with NSS, applied folded xeroform gauze and covered with dry gauze. Secure with medipore tape. Wound Recommendations:    M-W-F cleanse wound with NSS; apply xeroform gauze and cover with dry 4x4's. Secure with Medipore tape. Discussed with SHEILA Reese, .     Transition of Care: Plan to follow weekly and as needed while admitted to hospital.      Manohar Shay RN, BSN  Wound Care Nurse, [facility]  [phone]

## 2023-06-19 NOTE — GROUP NOTE
Group Therapy Note    Date: 6/19/2023    Group Start Time: 0900  Group End Time: 1000  Group Topic: Topic Group    [address] ACUTE BEHAV [address] [facility]        Group Therapy Note    Attendees:        Patient's Goal:  To participate in chair exercise routine     Status After Intervention:  Improved    Participation Level: Interactive    Participation Quality: Attentive      Speech:  normal      Thought Process/Content: Logical      Affective Functioning: Congruent      Mood: euthymic      Level of consciousness:  Attentive      Response to Learning: Progressing to goal      Endings: None Reported    Modes of Intervention: Movement      Discipline Responsible: Recreational Therapist      Signature:  Amber Rutledge

## 2023-06-19 NOTE — PERIOP NOTE
PACU DISCHARGE NOTE    Vital signs stable, pain well controlled, alert and oriented times three or at baseline, follow up per surgeon, no anesthetic complications. Gave report to riky on BHU unit.

## 2023-06-19 NOTE — ANESTHESIA POSTPROCEDURE EVALUATION
Department of Anesthesiology  Postprocedure Note    Patient: Ray Langford  MRN: [id number]  YOB: 1955  Date of evaluation: 6/19/2023      Procedure Summary     Date: 06/19/23 Room / Location: [facility] OR R2 / [facility] OR    Anesthesia Start: 2553 Anesthesia Stop: 0831    Procedure: ELECTROCONVULSIVE THERAPY (Head) Diagnosis:       Major depressive disorder, recurrent episode, moderate (HCC)      (Major depressive disorder, recurrent episode, moderate ([facility] [F33.1])    Surgeons: Arsh Merrill MD Responsible Provider: Scooter Goldberg MD    Anesthesia Type: general ASA Status: 2          Anesthesia Type: No value filed.     Virgen Phase I: Virgen Score: 10    Virgen Phase II: Virgen Score: 10      Anesthesia Post Evaluation    Patient location during evaluation: PACU  Patient participation: complete - patient participated  Level of consciousness: awake and alert  Airway patency: patent  Nausea & Vomiting: no vomiting and no nausea  Complications: no  Cardiovascular status: hemodynamically stable  Respiratory status: acceptable  Hydration status: stable

## 2023-06-19 NOTE — PLAN OF CARE
Problem: Self Harm/Suicidality  Goal: Will have no self-injury during hospital stay  Description: INTERVENTIONS:  1. Ensure constant observer at bedside with Q15M safety checks  2. Maintain a safe environment  3. Secure patient belongings  4. Ensure family/visitors adhere to safety recommendations  5. Ensure safety tray has been added to patient's diet order  6. Every shift and PRN: Re-assess suicidal risk via Frequent Screener    Outcome: Progressing     Problem: Anxiety  Goal: Will report anxiety at manageable levels  Description: INTERVENTIONS:  1. Administer medication as ordered  2. Teach and rehearse alternative coping skills  3. Provide emotional support with 1:1 interaction with staff  Outcome: Progressing     Problem: Skin/Tissue Integrity  Goal: Absence of new skin breakdown  Description: 1. Monitor for areas of redness and/or skin breakdown  2. Assess vascular access sites hourly  3. Every 4-6 hours minimum:  Change oxygen saturation probe site  4. Every 4-6 hours:  If on nasal continuous positive airway pressure, respiratory therapy assess nares and determine need for appliance change or resting period.   Outcome: Progressing     Problem: Safety - Adult  Goal: Free from fall injury  Outcome: Progressing

## 2023-06-19 NOTE — CARE COORDINATION
06/19/23 0938   ITP   Date of Plan 06/19/23   Date of Next Review 06/26/23   Primary Diagnosis Code Depression   Barriers to Treatment Client resistance; Need for psychoeducation   Strengths Incorporated in Plan Acknowledging need for assistance;Community supports; Support network;Verbal   Short Term Goal 1   Short Term Goal 1 Client will be oriented to program and staff, and participate in assessment process   Baseline Functioning Patient will be oriented x 4 prior to discharge   Target Patient will participate in treatment team daily and meet with staff daily   Objectives Client will participate in group therapy   Intervention 1 Acknowledge client strengths;Assess safety;Crisis support;Milieu therapy and support   Frequency Daily   Measured by Behavioral data   Staff Responsible [facility] staff   Intervention 2 Acknowledge client strengths;Assess safety;Milieu therapy and support;Monitor medications   Frequency Daily   Measured by Behavioral data   Staff Responsible [facility] staff   Intervention 3 Acknowledge client strengths;Assess safety;Milieu therapy and support   Frequency Daily   Measured by Behavioral data   Staff Responsible [facility] staff   STG Goal 1 Status: Patient Appears to be  Progressing toward treatment plan goal   Short Term Goal 2   Short Term Goal 2 Client will maintain compliance with medication regime   Baseline Functioning Patient will be identify all of her medications prior to discharge   Target Patient will meet with treamtent team daily and discuss medications. Objectives Client will participate in group therapy   Intervention 1 Monitor medications;Milieu therapy and support; Acknowledge client strengths   Frequency Daily   Measured by Behavioral data   Staff Responsible [facility] staff   Intervention 2 Acknowledge client strengths;Milieu therapy and support;Monitor medications; Assess safety;Crisis support   Frequency Daily   Measured by Behavioral data   Staff Responsible [facility] staff   Intervention 3 Referral

## 2023-06-19 NOTE — BH NOTE
PSYCHOSOCIAL ASSESSMENT  :Patient identifying info:   Francesco Fernández is a 78 y.o., female admitted 6/17/2023  1:23 AM     Presenting problem and precipitating factors: Patient is a 78year old female with a hx of Bipolar disorder who was admitted to [facility] last month and came to [facility] to  ECT. Patient was sent home with medications but continued to decompensate in the community. Patient presents as a poor historian and needs to be assessed by Neurology as well. Patient does not have capacity to care for self due to altered mental status. Patient recently moved and per chart review, patient has been isolative and some what catatonic. Patient is not able to keep self safe in the community at this time and will start ECT at [facility]. Mental status assessment: WILY    Strengths/Recreation/Coping Skills: outpatient support, stable housing, family and friend support     Collateral information:     Current psychiatric /substance abuse providers and contact info:   Dr. Gilbert Hill, [facility]   Dr. Catina Arnold    Previous psychiatric/substance abuse providers and response to treatment: [facility] May this year    Family history of mental illness or substance abuse:     Substance abuse history:  UDS negative   Social History     Tobacco Use    Smoking status: Never    Smokeless tobacco: Not on file   Substance Use Topics    Alcohol use: Never       History of biomedical complications associated with substance abuse:     Patient's current acceptance of treatment or motivation for change: fair     Family constellation: Patient is , with no children     Is significant other involved?      Describe support system: friends     Describe living arrangements and home environment: lives alone    GUARDIAN/POA: No    Guardian Name:     Guardian Contact:     Health issues:     Trauma history:     Legal issues:     History of  service:     Financial status: SSDI    Amish/cultural factors: none    Education/work history: retired     Have you been licensed as a health care professional (current or ): n/a    Describe coping skills:limited and ineffective     Jose Calles  2023

## 2023-06-19 NOTE — CARE COORDINATION
06/19/23 0938   ITP   Date of Plan 06/19/23   Date of Next Review 06/26/23   Primary Diagnosis Code Depression   Barriers to Treatment Client resistance; Need for psychoeducation   Strengths Incorporated in Plan Acknowledging need for assistance;Community supports; Support network;Verbal   Short Term Goal 1   Short Term Goal 1 Client will be oriented to program and staff, and participate in assessment process   Baseline Functioning Patient will be oriented x 4 prior to discharge   Target Patient will participate in treatment team daily and meet with staff daily   Objectives Client will participate in group therapy   Intervention 1 Acknowledge client strengths;Assess safety;Crisis support;Milieu therapy and support   Frequency Daily   Measured by Behavioral data   Staff Responsible [facility] staff   Intervention 2 Acknowledge client strengths;Assess safety;Milieu therapy and support;Monitor medications   Frequency Daily   Measured by Behavioral data   Staff Responsible [facility] staff   Intervention 3 Acknowledge client strengths;Assess safety;Milieu therapy and support   Frequency Daily   Measured by Behavioral data   Staff Responsible [facility] staff   STG Goal 1 Status: Patient Appears to be  Progressing toward treatment plan goal   Short Term Goal 2   Short Term Goal 2 Client will maintain compliance with medication regime   Baseline Functioning Patient will be identify all of her medications prior to discharge   Target Patient will meet with treamtent team daily and discuss medications. Objectives Client will participate in group therapy   Intervention 1 Monitor medications;Milieu therapy and support; Acknowledge client strengths   Frequency Daily   Measured by Behavioral data   Staff Responsible [facility] staff   Intervention 2 Acknowledge client strengths;Milieu therapy and support;Monitor medications; Assess safety;Crisis support   Frequency Daily   Measured by Behavioral data   Staff Responsible [facility] staff   Intervention 3 Referral

## 2023-06-19 NOTE — PROCEDURES
ECT PROCEDURE NOTE      IDENTIFICATION:    Patient Name  Peyton Abarca   Date of Birth 1955   [facility] [id number]   Medical Record Number  [id number]      Age  78 y.o. PCP Roxy May MD   Admit date:  6/17/2023    Room Number  OR/PL  @ [facility]   Date of Service  6/19/2023            Electro Convulsive Therapy:  Treatment number 1        Maintenance ECT - NO   Laura Wade was admitted to the PACU for ECT. Patient was medically cleared and NPO for procedure. Patient is not court mandated and gave informed consent for ECT treatment. Patient received     Bilateral ECT - YES    Administered using the [address] [place] [address]. at 35 % Energy, under general anesthesia. Laura Wade with a good, well generalized seizure of indetrminate seconds on observation of the motor manifestations of the seizure. EEG duration was 55 secs. Post-Ictal Suppression Index: <10 %  Recovery was unremarkable and with no complications. Change in Energy %: 40%            Anesthesia medications administered during procedure:  Brevital:  50 mg  Change for next treament: 40 mg    Succinylcholine: 40 mg  Change for next treatment:  30 mg    Propofol: mg  Glycopyrrolate:  mg  Labetalol:  mg  Esmolol:  mg  Lorazepam:  mg  Ketamine:  mg  Zofran:   mg    Toradol:  mg  Lidocaine:  mg  Caffeine Benzoate: mg       No flowsheet data found.     SIGNED:    Enoc Jacome MD  6/19/2023

## 2023-06-19 NOTE — PROGRESS NOTES
Comprehensive Nutrition Assessment    Type and Reason for Visit:  Initial, Consult    Nutrition Recommendations/Plan:   Diet as tolerated, advance as medically appropriate with extra protein and dessert on meal trays, please  Supplements ordered for meal trays         Nutrition Assessment:    Pt admitted to [facility], reports recent wt loss 2' depression and poor appetite. Here for ECT. Hx unremarkable per nutrition ex for low weight/BMI    Nutrition Related Findings:    Pt tolerating diet when not NPO for ECT Wound Type: None       Current Nutrition Intake & Therapies:    Average Meal Intake: 51-75%  Average Supplements Intake: None Ordered  Diet NPO    Anthropometric Measures:  Height: 170.1 cm (5' 6.97\")  Ideal Body Weight (IBW): 135 lbs (61 kg)       Current Body Weight: 114 lb 11.2 oz (52 kg), 85 % IBW.  Weight Source: Standing Scale  Current BMI (kg/m2): 18        Weight Adjustment For: No Adjustment                 BMI Categories: Underweight (BMI less than 22) age over 72    Estimated Daily Nutrient Needs:  Energy Requirements Based On: Formula  Weight Used for Energy Requirements: Current  Energy (kcal/day): 1830  Weight Used for Protein Requirements: Current  Protein (g/day): 70  Method Used for Fluid Requirements: 1 ml/kcal  Fluid (ml/day): 2114    Nutrition Diagnosis:   Inadequate protein-energy intake, In context of social or environmental circumstances, Unintended weight loss related to cognitive or neurological impairment, inadequate protein-energy intake as evidenced by intake 51-75%, poor intake prior to admission, BMI, weight loss    Nutrition Interventions:   Food and/or Nutrient Delivery: Modify Current Diet, Start Oral Nutrition Supplement  Nutrition Education/Counseling: No recommendation at this time  Coordination of Nutrition Care: Continue to monitor while inpatient       Goals:  Previous Goal Met: Progressing toward Goal(s)  Goals: PO intake 50% or greater, by next RD assessment       Nutrition

## 2023-06-19 NOTE — GROUP NOTE
Group Therapy Note    Date: 6/19/2023    Group Start Time: 1500  Group End Time: 1600  Group Topic: Recreational    [address] ACUTE BEHAV HLTH    [address]        Group Therapy Note    Attendees:        Patient's Goal:  To concentrate on selected task    Notes:  Pt did not attend session    Discipline Responsible: Recreational Therapist      Signature:  Nicolas Marsh

## 2023-06-19 NOTE — PLAN OF CARE
Problem: Self Harm/Suicidality  Goal: Will have no self-injury during hospital stay  Description: INTERVENTIONS:  1. Ensure constant observer at bedside with Q15M safety checks  2. Maintain a safe environment  3. Secure patient belongings  4. Ensure family/visitors adhere to safety recommendations  5. Ensure safety tray has been added to patient's diet order  6. Every shift and PRN: Re-assess suicidal risk via Frequent Screener    6/19/2023 1034 by Sosa Young RN  Outcome: Progressing  6/19/2023 0032 by Pepito Francois RN  Outcome: Progressing     Problem: Psychosis  Goal: Will report no hallucinations or delusions  Description: INTERVENTIONS:  1. Administer medication as  ordered  2. Assist with reality testing to support increasing orientation  3. Assess if patient's hallucinations or delusions are encouraging self harm or harm to others and intervene as appropriate  Outcome: Progressing     Problem: Behavior  Goal: Pt/Family maintain appropriate behavior and adhere to behavioral management agreement, if implemented  Description: INTERVENTIONS:  1. Assess patient/family's coping skills and  non-compliant behavior (including use of illegal substances)  2. Notify security of behavior or suspected illegal substances which indicate the need for search of the family and/or belongings  3. Encourage verbalization of thoughts and concerns in a socially appropriate manner  4. Utilize positive, consistent limit setting strategies supporting safety of patient, staff and others  5. Encourage participation in the decision making process about the behavioral management agreement  6. If a visitor's behavior poses a threat to safety call refer to organization policy.   7. Initiate consult with , Psychosocial CNS, Spiritual Care as appropriate  Outcome: Progressing  Flowsheets  Taken 6/19/2023 0930 by Sosa Young RN  Patient/family maintains appropriate behavior and adheres to behavioral management

## 2023-06-19 NOTE — ANESTHESIA PRE PROCEDURE
Department of Anesthesiology  Preprocedure Note       Name:  Patricio Smith   Age:  78 y.o.  :  1955                                          MRN:  [id number]         Date:  2023      Surgeon: Curtis Gracia):  Aislinn Nathan MD    Procedure: Procedure(s):  ELECTROCONVULSIVE THERAPY    Medications prior to admission:   Prior to Admission medications    Medication Sig Start Date End Date Taking? Authorizing Provider   LORazepam (ATIVAN) 0.5 MG tablet Take 1 tablet by mouth 3 times daily for 30 days. Max Daily Amount: 1.5 mg 23  Matthew Haskins MD   gabapentin (NEURONTIN) 100 MG capsule Take 1 capsule by mouth 3 times daily for 30 days. Max Daily Amount: 300 mg 23  Matthew Haskins MD   mirtazapine (REMERON) 15 MG tablet Take 1 tablet by mouth nightly 23  Matthew Haskins MD   lisinopril (PRINIVIL;ZESTRIL) 30 MG tablet Take 1 tablet by mouth daily 23  Matthew Haskins MD   leucovorin calcium (WELLCOVORIN) 5 MG tablet Take 1 tablet by mouth daily for 12 doses Take 1 tablet by mouth for 3 days after each weekly dose of methotrexate.  23  Matthew Haskins MD   methotrexate (RHEUMATREX) 15 MG chemo tablet Take 1 tablet by mouth once a week 23  Matthew Haskins MD   lurasidone (LATUDA) 20 MG TABS tablet Take 1 tablet by mouth Daily with supper 23  Matthew Haskins MD   folic acid (FOLVITE) 1 MG tablet Take 3 tablets by mouth daily    Ar Automatic Reconciliation       Current medications:    Current Facility-Administered Medications   Medication Dose Route Frequency Provider Last Rate Last Admin    melatonin tablet 9 mg  9 mg Oral Nightly PRN Aislinn Nathan MD   9 mg at 23    acetaminophen (TYLENOL) tablet 650 mg  650 mg Oral Q4H PRN JENN Tucker NP        polyethylene glycol (GLYCOLAX) packet 17 g  17 g Oral Daily PRN JENN Tucker NP        aluminum & magnesium hydroxide-simethicone (MAALOX)

## 2023-06-19 NOTE — BH NOTE
Pt alert and oriented x 4, calm and cooperative with the assessment process,Patient appeared sad,with a dull flat affect and depressed mood. Pt denied having anxiety, depression,SI/HI/AVH and pain Patient complied with medication and melatonin was given for insomnia. Q 15 minutes checks monitored for safety. 0000 am, pt NPO for scheduled ECT. Pt prepared for ECT. Slept for 8hrs. /[id number].

## 2023-06-19 NOTE — PERIOP NOTE
TRANSFER - IN REPORT:    Verbal report received from Vijay Hargrove RN on [place]  being received from [facility] for ordered procedure      Report consisted of patient's Situation, Background, Assessment and   Recommendations(SBAR). Information from the following report(s) Nurse Handoff Report was reviewed with the receiving nurse. Opportunity for questions and clarification was provided. Assessment completed upon patient's arrival to unit and care assumed.

## 2023-06-20 PROCEDURE — 6370000000 HC RX 637 (ALT 250 FOR IP): Performed by: PSYCHIATRY & NEUROLOGY

## 2023-06-20 PROCEDURE — 1240000000 HC EMOTIONAL WELLNESS R&B

## 2023-06-20 RX ORDER — MIRTAZAPINE 15 MG/1
30 TABLET, FILM COATED ORAL NIGHTLY
Status: DISCONTINUED | OUTPATIENT
Start: 2023-06-20 | End: 2023-07-14 | Stop reason: HOSPADM

## 2023-06-20 RX ADMIN — LISINOPRIL 30 MG: 5 TABLET ORAL at 08:42

## 2023-06-20 RX ADMIN — LURASIDONE HYDROCHLORIDE 40 MG: 40 TABLET, FILM COATED ORAL at 17:09

## 2023-06-20 RX ADMIN — FOLIC ACID 3 MG: 1 TABLET ORAL at 08:43

## 2023-06-20 RX ADMIN — MIRTAZAPINE 30 MG: 15 TABLET, FILM COATED ORAL at 20:51

## 2023-06-20 ASSESSMENT — PAIN SCALES - GENERAL: PAINLEVEL_OUTOF10: 0

## 2023-06-20 ASSESSMENT — PAIN - FUNCTIONAL ASSESSMENT: PAIN_FUNCTIONAL_ASSESSMENT: ADULT NONVERBAL PAIN SCALE (NPVS)

## 2023-06-20 NOTE — BH NOTE
Chief Complaint:  I hardly slept last night. Length of Stay: 3 Days    Interval History:  Ms. Mikala Ludwig is doing poorly. Says she barely slept last night and woke up feeling \"horrible\". Describes her mood as hopeless and has passive thoughts of dying. She remains isolative to her room and no energy or motivation. Calm and cooperative during the interview. Remains motivated to get started on ECT. At the present time the patient Laura Wade remains compliant with taking medications. Denies any adverse events from taking them and feels they have been beneficial.     6/19 - Laura Wade reports feeling good post ECT having had a headache treated with tylenol. No memory loss. Moods are good currently. Appropriately dressed and groomed. Denies SI/HI/AH/VH. No aggression or violence. Appropriately interactive and aware. Tolerating medications well. Eating and sleeping fairly. Wants to stay off of the Gabapentin. 6/20 - Laura Wade reports feeling good post ECT yesterday, but became restless as the day progressed. Moods are good. Appropriately dressed and groomed. Denies SI/HI/AH/VH. No aggression or violence. Appropriately interactive and aware. Tolerating medications well. Eating and sleeping poorly. Past Medical History:  History reviewed. No pertinent past medical history.         Labs:  Lab Results   Component Value Date/Time    WBC 4.9 06/10/2023 12:00 PM    HGB 13.3 06/10/2023 12:00 PM    HCT 40.5 06/10/2023 12:00 PM     06/10/2023 12:00 PM    MCV 98.8 06/10/2023 12:00 PM      Lab Results   Component Value Date/Time     06/10/2023 12:00 PM    K 3.6 06/10/2023 12:00 PM     06/10/2023 12:00 PM    CO2 31 06/10/2023 12:00 PM    BUN 23 06/10/2023 12:00 PM    GLOB 3.3 06/10/2023 12:00 PM    ALT 20 06/10/2023 12:00 PM      [unfilled]      Current Facility-Administered Medications   Medication Dose Route Frequency Provider Last Rate Last Admin    mirtazapine (Tamar Dandy) room air

## 2023-06-20 NOTE — BH NOTE
Behavioral Health Treatment Team Note     Patient goal(s) for today: to see my family  Treatment team focus/goals: continue ECT    Progress note: Pt presents ao x 4. Pt presents with bright affect today. Pt receiving ECT and will remain in hospital through the entirety of treatment. Pt had initially asked the Dr if she could have ECT done outpatient, Dr said ECT *can* be done outpatient but did not explicitly tell pt she could leave. Pt had reportedly taken this as a discharge plan for her to leave after a few ECT treatments, and began calling friends and family making plans. SW called pt family and pt family discussed these concerns. Pt family asked pt remain in hospital through all of ECT to ensure her stability and safety.  SW meeting with pt family tomorrow at 3:00 pm.    LOS:  3  Expected LOS: Pending ECT, 14-20 days    Insurance info/prescription coverage:  MEDICARE PART A  Date of last family contact:  6/20/2023  Family requesting physician contact today:  No  Discharge plan:  Return home after ECT  Guns in the home:  No  Outpatient provider(s):  Pending    Participating treatment team members: LUIS Martins

## 2023-06-20 NOTE — BH NOTE
Chief Complaint:  I hardly slept last night. Length of Stay: 2 Days    Interval History:  Ms. Justyn Chao is doing poorly. Says she barely slept last night and woke up feeling \"horrible\". Describes her mood as hopeless and has passive thoughts of dying. She remains isolative to her room and no energy or motivation. Calm and cooperative during the interview. Remains motivated to get started on ECT. At the present time the patient Laura Wade remains compliant with taking medications. Denies any adverse events from taking them and feels they have been beneficial.     6/19 - Laura Wade reports feeling good post ECT having had a headache treated with tylenol. No memory loss. Moods are good currently. Appropriately dressed and groomed. Denies SI/HI/AH/VH. No aggression or violence. Appropriately interactive and aware. Tolerating medications well. Eating and sleeping fairly. Wants to stay off of the Gabapentin. Past Medical History:  History reviewed. No pertinent past medical history.         Labs:  Lab Results   Component Value Date/Time    WBC 4.9 06/10/2023 12:00 PM    HGB 13.3 06/10/2023 12:00 PM    HCT 40.5 06/10/2023 12:00 PM     06/10/2023 12:00 PM    MCV 98.8 06/10/2023 12:00 PM      Lab Results   Component Value Date/Time     06/10/2023 12:00 PM    K 3.6 06/10/2023 12:00 PM     06/10/2023 12:00 PM    CO2 31 06/10/2023 12:00 PM    BUN 23 06/10/2023 12:00 PM    GLOB 3.3 06/10/2023 12:00 PM    ALT 20 06/10/2023 12:00 PM      [unfilled]      Current Facility-Administered Medications   Medication Dose Route Frequency Provider Last Rate Last Admin    [START ON 6/22/2023] leucovorin calcium (WELLCOVORIN) tablet 5 mg  5 mg Oral Once per day on Thu Fri Sat Cliff Mercer MD        melatonin tablet 9 mg  9 mg Oral Nightly PRN Artur Castillo MD   9 mg at 06/19/23 2038    acetaminophen (TYLENOL) tablet 650 mg  650 mg Oral Q4H PRN JENN Monae - NP   650 mg at 06/19/23

## 2023-06-20 NOTE — BH NOTE
The patient is pleasant and cooperative with a congruent affect. She is denying SI/HI/AVH and depression, but endorses some restlessness and anxiety which she feels is attributed to her ECT treatments. She is medication and meal adherent, seems to have good insight and judgement. Her recent and long term memory seem intact. Will continue to monitor.

## 2023-06-20 NOTE — GROUP NOTE
Group Therapy Note    Date: 6/20/2023    Group Start Time: 0900  Group End Time: 1000  Group Topic: Topic Group    [address] ACUTE BEHAV 300 [facility]        Group Therapy Note    Attendees:        Patient's Goal:  To participate in coping skills game    Status After Intervention:  Improved    Participation Level: Interactive    Participation Quality: Attentive and Sharing      Speech:  normal      Thought Process/Content: Logical      Affective Functioning: Congruent      Mood: euthymic      Level of consciousness:  Attentive      Response to Learning: Progressing to goal      Endings: None Reported    Modes of Intervention: Education      Discipline Responsible: Recreational Therapist      Signature:  Stanley Hillman

## 2023-06-20 NOTE — BH NOTE
Writer called pt brother and sister in law. Writer scheduled visit for tomorrow at 3:00 pm. Pt brother and sister in law asked SW to not discuss discharge planning with pt as she has a history of quickly decompensating post discharge. Pt family wants pt to remain in hospital to do ECT.  SW will communicate this to treatment team.    Hilda Cousins [phone]  Yani Hill (brother) [phone]    LUIS Ellington

## 2023-06-20 NOTE — PERIOP NOTE
Spoke with James Woods to advise patient scheduled for ECT procedure 6/21/2023, NPO after midnight, arrive PACU 0730.

## 2023-06-20 NOTE — PROGRESS NOTES
2200: Andrea Comment is awake and alert, visible in the milieu, and interactive upon approach. Hygiene is adequate, independent in ADLs. Gait is steady,  sleep and appetite patterns WNL per patient. Pt denies SI/HI and demonstrates no evidence of intent to harm self or others. Denies hallucinations at present. Pt  rates depression 4/10 and anxiety 5/10. Pt is compliant with scheduled meds. PRN meds given along with bedtime medications. Pt encouraged to continue to participate in care. Will continue to monitor pt with q 15 min checks and hourly nursing rounds.  Pt slept for a total of 7.5 hours

## 2023-06-20 NOTE — PLAN OF CARE
Problem: Depression  Goal: Will be euthymic at discharge  Description: INTERVENTIONS:  1. Administer medication as ordered  2. Provide emotional support via 1:1 interaction with staff  3. Encourage involvement in milieu/groups/activities  4. Monitor for social isolation  Outcome: Progressing     Problem: Behavior  Goal: Pt/Family maintain appropriate behavior and adhere to behavioral management agreement, if implemented  Description: INTERVENTIONS:  1. Assess patient/family's coping skills and  non-compliant behavior (including use of illegal substances)  2. Notify security of behavior or suspected illegal substances which indicate the need for search of the family and/or belongings  3. Encourage verbalization of thoughts and concerns in a socially appropriate manner  4. Utilize positive, consistent limit setting strategies supporting safety of patient, staff and others  5. Encourage participation in the decision making process about the behavioral management agreement  6. If a visitor's behavior poses a threat to safety call refer to organization policy. 7. Initiate consult with , Psychosocial CNS, Spiritual Care as appropriate  Outcome: Progressing     Problem: Anxiety  Goal: Will report anxiety at manageable levels  Description: INTERVENTIONS:  1. Administer medication as ordered  2. Teach and rehearse alternative coping skills  3.  Provide emotional support with 1:1 interaction with staff  Outcome: Progressing

## 2023-06-21 ENCOUNTER — ANESTHESIA (OUTPATIENT)
Facility: HOSPITAL | Age: 68
End: 2023-06-21
Payer: MEDICARE

## 2023-06-21 ENCOUNTER — ANESTHESIA EVENT (OUTPATIENT)
Facility: HOSPITAL | Age: 68
End: 2023-06-21
Payer: MEDICARE

## 2023-06-21 PROCEDURE — 90870 ELECTROCONVULSIVE THERAPY: CPT | Performed by: PSYCHIATRY & NEUROLOGY

## 2023-06-21 PROCEDURE — 6370000000 HC RX 637 (ALT 250 FOR IP): Performed by: PSYCHIATRY & NEUROLOGY

## 2023-06-21 PROCEDURE — 3600000002 HC SURGERY LEVEL 2 BASE: Performed by: PSYCHIATRY & NEUROLOGY

## 2023-06-21 PROCEDURE — 1240000000 HC EMOTIONAL WELLNESS R&B

## 2023-06-21 PROCEDURE — 6360000002 HC RX W HCPCS: Performed by: ANESTHESIOLOGY

## 2023-06-21 PROCEDURE — 6360000002 HC RX W HCPCS: Performed by: PSYCHIATRY & NEUROLOGY

## 2023-06-21 PROCEDURE — 7100000001 HC PACU RECOVERY - ADDTL 15 MIN: Performed by: PSYCHIATRY & NEUROLOGY

## 2023-06-21 PROCEDURE — 2500000003 HC RX 250 WO HCPCS: Performed by: ANESTHESIOLOGY

## 2023-06-21 PROCEDURE — 6370000000 HC RX 637 (ALT 250 FOR IP): Performed by: NURSE PRACTITIONER

## 2023-06-21 PROCEDURE — 3700000000 HC ANESTHESIA ATTENDED CARE: Performed by: PSYCHIATRY & NEUROLOGY

## 2023-06-21 PROCEDURE — 2709999900 HC NON-CHARGEABLE SUPPLY: Performed by: PSYCHIATRY & NEUROLOGY

## 2023-06-21 PROCEDURE — 7100000000 HC PACU RECOVERY - FIRST 15 MIN: Performed by: PSYCHIATRY & NEUROLOGY

## 2023-06-21 RX ORDER — SODIUM CHLORIDE 9 MG/ML
INJECTION, SOLUTION INTRAVENOUS CONTINUOUS
Status: DISCONTINUED | OUTPATIENT
Start: 2023-06-21 | End: 2023-06-26

## 2023-06-21 RX ORDER — SODIUM CHLORIDE 0.9 % (FLUSH) 0.9 %
5-40 SYRINGE (ML) INJECTION PRN
Status: DISCONTINUED | OUTPATIENT
Start: 2023-06-21 | End: 2023-06-21 | Stop reason: HOSPADM

## 2023-06-21 RX ORDER — SUCCINYLCHOLINE/SOD CL,ISO/PF 100 MG/5ML
SYRINGE (ML) INTRAVENOUS PRN
Status: DISCONTINUED | OUTPATIENT
Start: 2023-06-21 | End: 2023-06-21 | Stop reason: SDUPTHER

## 2023-06-21 RX ORDER — SODIUM CHLORIDE 9 MG/ML
INJECTION, SOLUTION INTRAVENOUS PRN
Status: DISCONTINUED | OUTPATIENT
Start: 2023-06-21 | End: 2023-06-21 | Stop reason: HOSPADM

## 2023-06-21 RX ORDER — SODIUM CHLORIDE 0.9 % (FLUSH) 0.9 %
5-40 SYRINGE (ML) INJECTION EVERY 12 HOURS SCHEDULED
Status: DISCONTINUED | OUTPATIENT
Start: 2023-06-21 | End: 2023-06-21 | Stop reason: HOSPADM

## 2023-06-21 RX ORDER — SODIUM CHLORIDE 9 MG/ML
INJECTION, SOLUTION INTRAVENOUS CONTINUOUS
Status: DISCONTINUED | OUTPATIENT
Start: 2023-06-21 | End: 2023-06-21 | Stop reason: HOSPADM

## 2023-06-21 RX ADMIN — ACETAMINOPHEN 650 MG: 325 TABLET ORAL at 09:56

## 2023-06-21 RX ADMIN — METHOTREXATE 15 MG: 2.5 TABLET ORAL at 10:15

## 2023-06-21 RX ADMIN — Medication 30 MG: at 08:50

## 2023-06-21 RX ADMIN — MIRTAZAPINE 30 MG: 15 TABLET, FILM COATED ORAL at 20:47

## 2023-06-21 RX ADMIN — LURASIDONE HYDROCHLORIDE 40 MG: 40 TABLET, FILM COATED ORAL at 17:40

## 2023-06-21 RX ADMIN — LISINOPRIL 30 MG: 5 TABLET ORAL at 09:56

## 2023-06-21 RX ADMIN — LORAZEPAM 0.5 MG: 0.5 TABLET ORAL at 16:19

## 2023-06-21 RX ADMIN — FOLIC ACID 3 MG: 1 TABLET ORAL at 09:56

## 2023-06-21 RX ADMIN — Medication 40 MG: at 08:50

## 2023-06-21 ASSESSMENT — PAIN SCALES - GENERAL
PAINLEVEL_OUTOF10: 0
PAINLEVEL_OUTOF10: 9

## 2023-06-21 ASSESSMENT — PAIN - FUNCTIONAL ASSESSMENT
PAIN_FUNCTIONAL_ASSESSMENT: ADULT NONVERBAL PAIN SCALE (NPVS)
PAIN_FUNCTIONAL_ASSESSMENT: ADULT NONVERBAL PAIN SCALE (NPVS)
PAIN_FUNCTIONAL_ASSESSMENT: 0-10
PAIN_FUNCTIONAL_ASSESSMENT: ADULT NONVERBAL PAIN SCALE (NPVS)
PAIN_FUNCTIONAL_ASSESSMENT: ADULT NONVERBAL PAIN SCALE (NPVS)
PAIN_FUNCTIONAL_ASSESSMENT: ACTIVITIES ARE NOT PREVENTED

## 2023-06-21 ASSESSMENT — PAIN DESCRIPTION - PAIN TYPE: TYPE: ACUTE PAIN

## 2023-06-21 ASSESSMENT — PAIN SCALES - WONG BAKER: WONGBAKER_NUMERICALRESPONSE: 8

## 2023-06-21 ASSESSMENT — PAIN DESCRIPTION - LOCATION: LOCATION: HEAD

## 2023-06-21 ASSESSMENT — PAIN DESCRIPTION - DESCRIPTORS: DESCRIPTORS: ACHING

## 2023-06-21 NOTE — BH NOTE
Chief Complaint:  I hardly slept last night. Length of Stay: 4 Days    Interval History:  Ms. Maria Elena Brown is doing poorly. Says she barely slept last night and woke up feeling \"horrible\". Describes her mood as hopeless and has passive thoughts of dying. She remains isolative to her room and no energy or motivation. Calm and cooperative during the interview. Remains motivated to get started on ECT. At the present time the patient Laura Wade remains compliant with taking medications. Denies any adverse events from taking them and feels they have been beneficial.     6/19 - Laura Wade reports feeling good post ECT having had a headache treated with tylenol. No memory loss. Moods are good currently. Appropriately dressed and groomed. Denies SI/HI/AH/VH. No aggression or violence. Appropriately interactive and aware. Tolerating medications well. Eating and sleeping fairly. Wants to stay off of the Gabapentin. 6/20 - Laura Wade reports feeling good post ECT yesterday, but became restless as the day progressed. Moods are good. Appropriately dressed and groomed. Denies SI/HI/AH/VH. No aggression or violence. Appropriately interactive and aware. Tolerating medications well. Eating and sleeping poorly. 6/21 - Laura Wade reports feeling tired with a migraine post ECT # 2. Motrin helping a little but feels the headache is worse. No memory loss. Moods are depressed 7/10 post procedure. Appropriately dressed and groomed. Denies SI/HI/AH/VH. No aggression or violence. Appropriately interactive and aware. Tolerating medications well. Eating and sleeping fairly. Insight is fair and judgement is fair. Past Medical History:  History reviewed. No pertinent past medical history.         Labs:  Lab Results   Component Value Date/Time    WBC 4.9 06/10/2023 12:00 PM    HGB 13.3 06/10/2023 12:00 PM    HCT 40.5 06/10/2023 12:00 PM     06/10/2023 12:00 PM    MCV 98.8

## 2023-06-21 NOTE — PROGRESS NOTES
Pt was noted in the milieu. She presented alert and oriented, flat affect, mood euthymic. She denied SI/HI/AVH. She was compliant with her scheduled meds. Pt appeared to have slept approx 8 hours during the night. No distress observed. She was maintained NPO after MN for ECT this morning. VS obtained, clean gown, underwear, gripper socks, and mask provided. Report given to PACU nurse. Monitoring for safety and behaviors continues.

## 2023-06-21 NOTE — PLAN OF CARE
This writer met with pt in her bedroom after returning from ECT. VSS. Pt c/o 9/10 HA and nausea, given PRN Tylenol and ginger ale with good effect. Affect and eye contact appropriate. Denies SI/HI/AVH. Reports 7/10 depression. Pt is pleasant, calm, and social. Visible in milieu, interactive with staff and peers. No acute concerns to note at this time. Problem: Behavior  Goal: Pt/Family maintain appropriate behavior and adhere to behavioral management agreement, if implemented  Description: INTERVENTIONS:  1. Assess patient/family's coping skills and  non-compliant behavior (including use of illegal substances)  2. Notify security of behavior or suspected illegal substances which indicate the need for search of the family and/or belongings  3. Encourage verbalization of thoughts and concerns in a socially appropriate manner  4. Utilize positive, consistent limit setting strategies supporting safety of patient, staff and others  5. Encourage participation in the decision making process about the behavioral management agreement  6. If a visitor's behavior poses a threat to safety call refer to organization policy.   7. Initiate consult with , Psychosocial CNS, Spiritual Care as appropriate  Outcome: Progressing

## 2023-06-21 NOTE — PERIOP NOTE
TRANSFER - IN REPORT:    Verbal report received from Landrum, RN on E[place]ka [place]  being received from [facility] for ordered procedure      Report consisted of patient's Situation, Background, Assessment and   Recommendations(SBAR). Information from the following report(s) Nurse Handoff Report was reviewed with the receiving nurse. Opportunity for questions and clarification was provided. Assessment completed upon patient's arrival to unit and care assumed.

## 2023-06-21 NOTE — PERIOP NOTE
Dalton Benham  1955  [id number]    Situation:  Verbal report given from: Dr. Lynn Mari RN   Procedure: Procedure(s):  ELECTROCONVULSIVE THERAPY    Background:    Preoperative diagnosis: Major depressive disorder, recurrent episode, moderate (HCC) [F33.1]    Postoperative diagnosis: * No post-op diagnosis entered *    :  Dr. Taisha Gloria    Assistant(s): Circulator: Christopher Quinones RN  Scrub Person First: Ramona Mckenzie    Specimens: * No specimens in log *    Assessment:  Intra-procedure medications         Anesthesia gave intra-procedure sedation and medications, see anesthesia flow sheet     Intravenous fluids: LR@ KVO     Vital signs stable

## 2023-06-21 NOTE — ANESTHESIA POSTPROCEDURE EVALUATION
Department of Anesthesiology  Postprocedure Note    Patient: Gamaliel Milian  MRN: [id number]  YOB: 1955  Date of evaluation: 6/21/2023      Procedure Summary     Date: 06/21/23 Room / Location: [facility] OR R2 / [facility] OR    Anesthesia Start: 3157 Anesthesia Stop: 0901    Procedure: ELECTROCONVULSIVE THERAPY (Head) Diagnosis:       Major depressive disorder, recurrent episode, moderate (HCC)      ([facility], recurrent episode, [facility] ([facility] [F33.1])    Surgeons: Guille Keane MD Responsible Provider: Montse Palumbo MD    Anesthesia Type: general ASA Status: 2          Anesthesia Type: No value filed.     Virgen Phase I: Virgen Score: 10    Virgen Phase II: Virgen Score: 10      Anesthesia Post Evaluation    Patient location during evaluation: PACU  Patient participation: waiting for patient participation  Level of consciousness: sleepy but conscious  Airway patency: patent  Nausea & Vomiting: no vomiting and no nausea  Complications: no  Cardiovascular status: hemodynamically stable  Respiratory status: acceptable  Hydration status: stable

## 2023-06-21 NOTE — BH NOTE
Behavioral Health Treatment Team Note     Patient goal(s) for today: to rest  Treatment team focus/goals: Continue ECT treatment, cancel family meeting. Progress note: Pt presents ao x 4. Pt had ECT #3 today and pt reports having a migraine/headache afterwards. Pt reports that she wants to cancel her meeting with her brother due to the headache. SW cancelled meeting and rescheduled for Friday. Pt endorses depression 7/10. Pt reports tolerating ECT well.     LOS:  4  Expected LOS: 16-21    Insurance info/prescription coverage:  MEDICARE PART A  Date of last family contact:  6/21/2023  Family requesting physician contact today:  No  Discharge plan:  Return home after ECT  Guns in the home:  No  Outpatient provider(s):  Pending     Participating treatment team members: LUIS Chávez

## 2023-06-21 NOTE — ANESTHESIA PRE PROCEDURE
Department of Anesthesiology  Preprocedure Note       Name:  Darshana Romero   Age:  78 y.o.  :  1955                                          MRN:  [id number]         Date:  2023      Surgeon: Rajan Dutta):  Jackie Ibarra MD    Procedure: Procedure(s):  ELECTROCONVULSIVE THERAPY    Medications prior to admission:   Prior to Admission medications    Medication Sig Start Date End Date Taking? Authorizing Provider   LORazepam (ATIVAN) 0.5 MG tablet Take 1 tablet by mouth 3 times daily for 30 days. Max Daily Amount: 1.5 mg 23  Zoila Vila MD   gabapentin (NEURONTIN) 100 MG capsule Take 1 capsule by mouth 3 times daily for 30 days. Max Daily Amount: 300 mg 23  Zoila Vila MD   mirtazapine (REMERON) 15 MG tablet Take 1 tablet by mouth nightly 23  Zoila Vila MD   lisinopril (PRINIVIL;ZESTRIL) 30 MG tablet Take 1 tablet by mouth daily 23  Zoila Vila MD   leucovorin calcium (WELLCOVORIN) 5 MG tablet Take 1 tablet by mouth daily for 12 doses Take 1 tablet by mouth for 3 days after each weekly dose of methotrexate. 23  Zoila Vila MD   methotrexate (RHEUMATREX) 15 MG chemo tablet Take 1 tablet by mouth once a week 23  Zoila Vila MD   lurasidone (LATUDA) 20 MG TABS tablet Take 1 tablet by mouth Daily with supper 23  Zoila Vila MD   folic acid (FOLVITE) 1 MG tablet Take 3 tablets by mouth daily    Ar Automatic Reconciliation       Current medications:    No current facility-administered medications for this visit. No current outpatient medications on file.      Facility-Administered Medications Ordered in Other Visits   Medication Dose Route Frequency Provider Last Rate Last Admin    mirtazapine (REMERON) tablet 30 mg  30 mg Oral Nightly Osmani Fisher MD   30 mg at 23    [START ON 2023] leucovorin calcium (WELLCOVORIN) tablet 5 mg  5 mg Oral Once per day on  Sat

## 2023-06-21 NOTE — GROUP NOTE
Group Therapy Note    Date: 6/21/2023    Group Start Time: 0900  Group End Time: 1000  Group Topic: Topic Group    [address] ACUTE BEHAV 300 [facility]        Group Therapy Note    Attendees:        Patient's Goal:  To participate in mental health Mayomi game    Notes:  Pt reported not feeling well-left session    Discipline Responsible: Recreational Therapist      Signature:  Heriberto Mcgowan

## 2023-06-21 NOTE — GROUP NOTE
Group Therapy Note    Date: 6/21/2023    Group Start Time: 1500  Group End Time: 1600  Group Topic: Recreational    [facility] [place] [address] ACUTE BEHAV HLTH    [address]        Group Therapy Note    Attendees:        Patient's Goal:  To concentrate on selected task      Status After Intervention:  Improved    Participation Level: Interactive    Participation Quality: Attentive and Sharing      Speech:  normal      Thought Process/Content: Logical      Affective Functioning: Congruent      Mood: euthymic      Level of consciousness:  Attentive      Response to Learning: Progressing to goal      Endings: None Reported    Modes of Intervention: Socialization and Activity      Discipline Responsible: Recreational Therapist      Signature:  Florence Shukla

## 2023-06-21 NOTE — PERIOP NOTE
TRANSFER - OUT REPORT:    Verbal report given to Kings on Laura Wade  being transferred to u for routine post-op       Report consisted of patient's Situation, Background, Assessment and   Recommendations(SBAR). Information from the following report(s) Nurse Handoff Report was reviewed with the receiving nurse. Lines:       Opportunity for questions and clarification was provided.       Patient transported with:  [employer]

## 2023-06-21 NOTE — PROGRESS NOTES
Pt was noted in the milieu. She presented alert and oriented, flat affect, mood euthymic. She denied SI/HI/AVH. She was compliant with her scheduled meds. Pt appeared to have slept approx 8 hours during the night. No distress observed. Monitoring for safety and behaviors continues.

## 2023-06-21 NOTE — PROCEDURES
ECT PROCEDURE NOTE      IDENTIFICATION:    Patient Name  Juliette Amaya   Date of Birth 1955   [facility] [id number]   Medical Record Number  [id number]      Age  78 y.o. PCP Radha Chaudhary MD   Admit date:  6/17/2023    Room Number  OR/PL  @ [facility]   Date of Service  6/21/2023            Electro Convulsive Therapy:  Treatment number 2        Maintenance ECT - NO   Laura Wade was admitted to the PACU for ECT. Patient was medically cleared and NPO for procedure. Patient is not court mandated and gave informed consent for ECT treatment. Patient received     Bilateral ECT - YES    Administered using the [address] [place] [address]. at 40 % Energy, under general anesthesia. Laura Wade with a good, well generalized seizure of 67 seconds on observation of the motor manifestations of the seizure. EEG duration was NA secs. Post-Ictal Suppression Index: NA %  Recovery was unremarkable and with no complications. Change in Energy %:            Anesthesia medications administered during procedure:  Brevital:  40 mg  Change for next treament:     Succinylcholine: 30 mg  Change for next treatment:      Propofol: mg  Glycopyrrolate:  mg  Labetalol:  mg  Esmolol:  mg  Lorazepam:  mg  Ketamine:  mg  Zofran:   mg    Toradol:  mg  Lidocaine:  mg  Caffeine Benzoate: mg       No flowsheet data found.     SIGNED:    Huseyin Thakkar MD  6/21/2023

## 2023-06-21 NOTE — PERIOP NOTE
Spoke with Gabriella, [address] [place] [address] to advise patient scheduled for ECT procedure 6/23/2023, NPO after midnight, arrive PACU 0730.

## 2023-06-22 PROCEDURE — 6370000000 HC RX 637 (ALT 250 FOR IP): Performed by: PSYCHIATRY & NEUROLOGY

## 2023-06-22 PROCEDURE — 1240000000 HC EMOTIONAL WELLNESS R&B

## 2023-06-22 RX ADMIN — LURASIDONE HYDROCHLORIDE 40 MG: 40 TABLET, FILM COATED ORAL at 17:45

## 2023-06-22 RX ADMIN — LEUCOVORIN CALCIUM 5 MG: 5 TABLET ORAL at 09:02

## 2023-06-22 RX ADMIN — MIRTAZAPINE 30 MG: 15 TABLET, FILM COATED ORAL at 20:53

## 2023-06-22 RX ADMIN — LISINOPRIL 30 MG: 5 TABLET ORAL at 08:57

## 2023-06-22 RX ADMIN — FOLIC ACID 3 MG: 1 TABLET ORAL at 08:57

## 2023-06-22 NOTE — BH NOTE
Behavioral Health Treatment Team Note     Patient goal(s) for today: To keep myself busy  Treatment team focus/goals: Continue ECT, manage pt pain after treatments, schedule visits    Progress note: Pt presents ao x 4. Pt reports depression 5/10, denies SI/HI/AVH and anxiety. Pt has been compliant with medications and meals. Pt presents with bright affect and calm demeanor, pt pleasant with staff. Pt expressed some concerns she has regarding her ECT treatments. Pt received ECT #3 yesterday and reports migraines afterwards. Pt reports that if she continues to have migraines afterwards she does not want to continue the treatments. Treatment team advised pt yesterday that she can be pretreated for the migraines so that she does not have them after the treatments. Pt reports to LUCIE that she did not understand this and wants Dr. Kyung Ernandez to explain this to her today. SW will follow up with doctor. SW scheduling some visits with pt friends/family for the next few weeks pt is here while she continues her ECT. SW calling family today.     LOS:  5  Expected LOS: 16-21    Insurance info/prescription coverage:  MEDICARE PART A  Date of last family contact:  6/22/2023  Family requesting physician contact today:  No  Discharge plan:  Return home after ECT  Guns in the home:  No  Outpatient provider(s):  Pending     Participating treatment team members: LUIS Stiles

## 2023-06-22 NOTE — BH NOTE
Chief Complaint:  I hardly slept last night. Length of Stay: 5 Days    Interval History:  Ms. Radha Tanner is doing poorly. Says she barely slept last night and woke up feeling \"horrible\". Describes her mood as hopeless and has passive thoughts of dying. She remains isolative to her room and no energy or motivation. Calm and cooperative during the interview. Remains motivated to get started on ECT. At the present time the patient Laura Wade remains compliant with taking medications. Denies any adverse events from taking them and feels they have been beneficial.     6/19 - Laura Wade reports feeling good post ECT having had a headache treated with tylenol. No memory loss. Moods are good currently. Appropriately dressed and groomed. Denies SI/HI/AH/VH. No aggression or violence. Appropriately interactive and aware. Tolerating medications well. Eating and sleeping fairly. Wants to stay off of the Gabapentin. 6/20 - Laura Wade reports feeling good post ECT yesterday, but became restless as the day progressed. Moods are good. Appropriately dressed and groomed. Denies SI/HI/AH/VH. No aggression or violence. Appropriately interactive and aware. Tolerating medications well. Eating and sleeping poorly. 6/21 - Laura Wade reports feeling tired with a migraine post ECT # 2. Motrin helping a little but feels the headache is worse. No memory loss. Moods are depressed 7/10 post procedure. Appropriately dressed and groomed. Denies SI/HI/AH/VH. No aggression or violence. Appropriately interactive and aware. Tolerating medications well. Eating and sleeping fairly. Insight is fair and judgement is fair. 6/22 - Laura Wade reports feeling less spry than after the first ECT however notes improvements overall. Moods are fair. Appropriately dressed and groomed. Concerned about the headache she is getting and if it will be pretreated. Denies SI/HI/AH/VH.   No aggression psychiatric hospital services furnished since the previous certification were, and continue to be, required for treatment that could reasonably be expected to improve the patient's condition, or for diagnostic study, and that the patient continues to need, on a daily basis, active treatment furnished directly by or requiring the supervision of inpatient psychiatric facility personnel. In addition, the hospital records show that services furnished were intensive treatment services, admission or related services, or equivalent services.

## 2023-06-22 NOTE — GROUP NOTE
Group Therapy Note    Date: 6/22/2023    Group Start Time: 0900  Group End Time: 1000  Group Topic: Topic Group    [address] 3 ACUTE BEHAV 300 [facility]        Group Therapy Note    Attendees: 7       Patient's Goal:  To participate in chair exercise routine     Status After Intervention:  Improved    Participation Level: Interactive    Participation Quality: Attentive and Sharing      Speech:  normal      Thought Process/Content: Logical      Affective Functioning: Congruent      Mood: euthymic      Level of consciousness:  Attentive      Response to Learning: Progressing to goal      Endings: None Reported    Modes of Intervention: Movement      Discipline Responsible: Recreational Therapist      Signature:  Vanna Alcala

## 2023-06-22 NOTE — BH NOTE
Nurses Note;      1900 to 0700:      Report received from outgoing day shift RN. Assumed care of patient. On initial round Patient is visible in hallway ,alert, oriented,bed , calm and cooperative. Patient reports depression 5/10 denies S/I, H/I, A/V/H, pain,  anxiety. Patient is compliant  with HS medications. Patient received  no PRN. Dressing to left fore arm wrist intact. Patient observed resting in bed during shift rounds. Continuously hourly rounds and q 15 minute checks maintained during shift for care and safety. Patient slept for 8 hrs.

## 2023-06-23 ENCOUNTER — APPOINTMENT (OUTPATIENT)
Facility: HOSPITAL | Age: 68
DRG: 885 | End: 2023-06-23
Attending: PSYCHIATRY & NEUROLOGY
Payer: MEDICARE

## 2023-06-23 ENCOUNTER — ANESTHESIA (OUTPATIENT)
Facility: HOSPITAL | Age: 68
End: 2023-06-23
Payer: MEDICARE

## 2023-06-23 PROCEDURE — 6370000000 HC RX 637 (ALT 250 FOR IP): Performed by: PSYCHIATRY & NEUROLOGY

## 2023-06-23 PROCEDURE — 3600000002 HC SURGERY LEVEL 2 BASE: Performed by: PSYCHIATRY & NEUROLOGY

## 2023-06-23 PROCEDURE — 90870 ELECTROCONVULSIVE THERAPY: CPT | Performed by: PSYCHIATRY & NEUROLOGY

## 2023-06-23 PROCEDURE — 7100000000 HC PACU RECOVERY - FIRST 15 MIN: Performed by: PSYCHIATRY & NEUROLOGY

## 2023-06-23 PROCEDURE — 2709999900 HC NON-CHARGEABLE SUPPLY: Performed by: PSYCHIATRY & NEUROLOGY

## 2023-06-23 PROCEDURE — 6360000002 HC RX W HCPCS: Performed by: ANESTHESIOLOGY

## 2023-06-23 PROCEDURE — 3700000000 HC ANESTHESIA ATTENDED CARE: Performed by: PSYCHIATRY & NEUROLOGY

## 2023-06-23 PROCEDURE — 70450 CT HEAD/BRAIN W/O DYE: CPT

## 2023-06-23 PROCEDURE — 1240000000 HC EMOTIONAL WELLNESS R&B

## 2023-06-23 PROCEDURE — 2500000003 HC RX 250 WO HCPCS: Performed by: ANESTHESIOLOGY

## 2023-06-23 RX ORDER — SODIUM CHLORIDE 0.9 % (FLUSH) 0.9 %
5-40 SYRINGE (ML) INJECTION EVERY 12 HOURS SCHEDULED
Status: DISCONTINUED | OUTPATIENT
Start: 2023-06-23 | End: 2023-06-23 | Stop reason: HOSPADM

## 2023-06-23 RX ORDER — SODIUM CHLORIDE 9 MG/ML
INJECTION, SOLUTION INTRAVENOUS PRN
Status: DISCONTINUED | OUTPATIENT
Start: 2023-06-23 | End: 2023-06-23 | Stop reason: HOSPADM

## 2023-06-23 RX ORDER — SUCCINYLCHOLINE/SOD CL,ISO/PF 100 MG/5ML
SYRINGE (ML) INTRAVENOUS PRN
Status: DISCONTINUED | OUTPATIENT
Start: 2023-06-23 | End: 2023-06-23 | Stop reason: SDUPTHER

## 2023-06-23 RX ORDER — SODIUM CHLORIDE 9 MG/ML
INJECTION, SOLUTION INTRAVENOUS CONTINUOUS
Status: DISCONTINUED | OUTPATIENT
Start: 2023-06-23 | End: 2023-06-23 | Stop reason: HOSPADM

## 2023-06-23 RX ORDER — SODIUM CHLORIDE 0.9 % (FLUSH) 0.9 %
5-40 SYRINGE (ML) INJECTION PRN
Status: DISCONTINUED | OUTPATIENT
Start: 2023-06-23 | End: 2023-06-23 | Stop reason: HOSPADM

## 2023-06-23 RX ORDER — SODIUM CHLORIDE 9 MG/ML
INJECTION, SOLUTION INTRAVENOUS CONTINUOUS
Status: DISCONTINUED | OUTPATIENT
Start: 2023-06-23 | End: 2023-06-26

## 2023-06-23 RX ORDER — KETOROLAC TROMETHAMINE 30 MG/ML
INJECTION, SOLUTION INTRAMUSCULAR; INTRAVENOUS PRN
Status: DISCONTINUED | OUTPATIENT
Start: 2023-06-23 | End: 2023-06-23 | Stop reason: SDUPTHER

## 2023-06-23 RX ADMIN — Medication 40 MG: at 09:29

## 2023-06-23 RX ADMIN — LURASIDONE HYDROCHLORIDE 40 MG: 40 TABLET, FILM COATED ORAL at 16:27

## 2023-06-23 RX ADMIN — LEUCOVORIN CALCIUM 5 MG: 5 TABLET ORAL at 10:47

## 2023-06-23 RX ADMIN — FOLIC ACID 3 MG: 1 TABLET ORAL at 10:47

## 2023-06-23 RX ADMIN — MIRTAZAPINE 30 MG: 15 TABLET, FILM COATED ORAL at 21:06

## 2023-06-23 RX ADMIN — Medication 30 MG: at 09:29

## 2023-06-23 RX ADMIN — LISINOPRIL 30 MG: 5 TABLET ORAL at 10:47

## 2023-06-23 RX ADMIN — KETOROLAC TROMETHAMINE 30 MG: 30 INJECTION INTRAMUSCULAR; INTRAVENOUS at 09:27

## 2023-06-23 ASSESSMENT — PAIN - FUNCTIONAL ASSESSMENT
PAIN_FUNCTIONAL_ASSESSMENT: NONE - DENIES PAIN
PAIN_FUNCTIONAL_ASSESSMENT: ADULT NONVERBAL PAIN SCALE (NPVS)
PAIN_FUNCTIONAL_ASSESSMENT: NONE - DENIES PAIN

## 2023-06-23 ASSESSMENT — PAIN SCALES - GENERAL: PAINLEVEL_OUTOF10: 0

## 2023-06-23 NOTE — BH NOTE
Behavioral Health Treatment Team Note     Patient goal(s) for today: to see my family  Treatment team focus/goals: Continue ECT    Progress note: Pt presents ao x 4. Pt reports improvement in her depression. Pt received ECT #3 today. Pt also had meeting with her relatives today. Pt denied headache after ECT but pt right ear was bleeding.  SW will look into mobile crisis or MH skill building for follow up once pt finishes ECT.    LOS:  6  Expected LOS: 16-21    Insurance info/prescription coverage:  MEDICARE PART A  Date of last family contact:  6/23/2023  Family requesting physician contact today:  No  Discharge plan:  Return home after ECT  Guns in the home:  No  Outpatient provider(s):  Pending     Participating treatment team members: LUIS Jesus

## 2023-06-23 NOTE — ANESTHESIA POSTPROCEDURE EVALUATION
Department of Anesthesiology  Postprocedure Note    Patient: Melchor Cardenas  MRN: [id number]  YOB: 1955  Date of evaluation: 6/23/2023      Procedure Summary     Date: 06/23/23 Room / Location: [facility] OR R2 / [facility] OR    Anesthesia Start: 2161 Anesthesia Stop: 0940    Procedure: ELECTROCONVULSIVE THERAPY (Head) Diagnosis:       Major depressive disorder, recurrent episode, moderate (HCC)      (Major depressive disorder, recurrent episode, moderate ([facility] [F33.1])    Surgeons: Bianca Alvarado MD Responsible Provider: Althea Estevez MD    Anesthesia Type: general ASA Status: 2          Anesthesia Type: No value filed.     Virgen Phase I: Virgen Score: 10    Virgen Phase II: Virgen Score: 10      Anesthesia Post Evaluation    Patient participation: waiting for patient participation  Level of consciousness: sleepy but conscious  Airway patency: patent  Nausea & Vomiting: no vomiting and no nausea  Complications: no  Cardiovascular status: hemodynamically stable  Respiratory status: acceptable  Hydration status: stable

## 2023-06-23 NOTE — PERIOP NOTE
TRANSFER - IN REPORT:    Verbal report received from rhonda peoples Laurasuad Castjoey  being received from [facility] for ordered procedure      Report consisted of patient's Situation, Background, Assessment and   Recommendations(SBAR). Information from the following report(s) Nurse Handoff Report was reviewed with the receiving nurse. Opportunity for questions and clarification was provided. Assessment completed upon patient's arrival to unit and care assumed.

## 2023-06-23 NOTE — PROCEDURES
ECT PROCEDURE NOTE      IDENTIFICATION:    Patient Name  Twila Wallace   Date of Birth 1955   [facility] [id number]   Medical Record Number  [id number]      Age  78 y.o. PCP Abigail Curiel MD   Admit date:  6/17/2023    Room Number  OR/PL  @ [facility]   Date of Service  6/23/2023            Electro Convulsive Therapy:  Treatment number 3        Maintenance ECT - NO   Laura Wade was admitted to the PACU for ECT. Patient was medically cleared and NPO for procedure. Patient is not court mandated and gave informed consent for ECT treatment. Patient received     Bilateral ECT - YES    Administered using the [address] [place] [address]. at 40 % Energy, under general anesthesia. Laura Wade with a good, well generalized seizure of 55 seconds on observation of the motor manifestations of the seizure. EEG duration was 51 secs. Post-Ictal Suppression Index: 85.5 %  Recovery was unremarkable and with no complications. Change in Energy %:            Anesthesia medications administered during procedure:  Brevital:  40 mg  Change for next treament:     Succinylcholine: 30 mg  Change for next treatment:      Propofol: mg  Glycopyrrolate:  mg  Labetalol:  mg  Esmolol:  mg  Lorazepam:  mg  Ketamine:  mg  Zofran:   mg    Toradol: 30 mg  Lidocaine:  mg  Caffeine Benzoate: mg       No flowsheet data found.     SIGNED:    Jojo Nicole MD  6/23/2023

## 2023-06-23 NOTE — PERIOP NOTE
Mary Vega  1955  [id number]    Situation:  Verbal report given from: Suki Regan RN and Dr Perico Almonte   Procedure: Procedure(s):  ELECTROCONVULSIVE THERAPY    Background:    Preoperative diagnosis: Major depressive disorder, recurrent episode, moderate (HCC) [F33.1]    Postoperative diagnosis: * No post-op diagnosis entered *    :  Dr. Jacky Dinero     Assistant(s): Circulator: Shahnaz Richey RN  Scrub Person First: Dipika Lake    Specimens: * No specimens in log *    Assessment:  Intra-procedure medications         Anesthesia gave intra-procedure sedation and medications, see anesthesia flow sheet     Intravenous fluids: LR@ KVO     Vital signs stable

## 2023-06-23 NOTE — GROUP NOTE
Group Therapy Note    Date: 6/23/2023    Group Start Time: 1000  Group End Time: 1100  Group Topic: Topic Group    [facility] 3 ACUTE BEHAV HL    Amanda Huerta        Group Therapy Note    Attendees:        Patient's Goal:  To participate in relaxation activity     Status After Intervention:  Improved    Participation Level: Interactive    Participation Quality: Attentive      Speech:  normal      Thought Process/Content: Logical      Affective Functioning: Congruent      Mood: euthymic      Level of consciousness:  Attentive      Response to Learning: Progressing to goal      Endings: None Reported    Modes of Intervention: Activity      Discipline Responsible: Recreational Therapist      Signature:  Cami Corona

## 2023-06-23 NOTE — BH NOTE
Met with patient when patient returned from PACU status post ECT. Vital signs taken /86, Pulse 72, Temp 98.2, SPO2 100% on RA, Resp 18. Per report, patient had bleeding from right ear post op. Patient denies headache, ear pain and hearing loss. Patient is now resting in room. Will continue to monitor.

## 2023-06-23 NOTE — GROUP NOTE
Group Therapy Note    Date: 6/23/2023    Group Start Time: 1500  Group End Time: 1600  Group Topic: Recreational    [address] 3 ACUTE BEHAV HLTH    [address]        Group Therapy Note    Attendees:        Patient's Goal:  To concentrate on selected task    Notes:  Pt did not attend session    Discipline Responsible: Recreational Therapist      Signature:  Stanley Hillman

## 2023-06-23 NOTE — PERIOP NOTE
TRANSFER - OUT REPORT:    Verbal report given to an on Laura Wade to rhonda  being transferred to u for routine post-op       Report consisted of patient's Situation, Background, Assessment and   Recommendations(SBAR). Information from the following report(s) Nurse Handoff Report was reviewed with the receiving nurse. Lines:       Opportunity for questions and clarification was provided.       Patient transported with:  [employer]

## 2023-06-23 NOTE — BH NOTE
Met with patient sitting in her room. Patient is alert an oriented x4. Patient denies headache, dizziness, nausea, vomiting and pain. Patient stated that her right ear feels \"congested and full\" and feels like the hearing loss is getting worse. VS taken and stable. /73, Pulse 68, Temp 98.2, Resp 18 and SPO2 100% on RA.

## 2023-06-23 NOTE — BH NOTE
Met with patient sitting in her room. Flat affect. Endorses depression. Denies anxiety. Denies SI, HI and AVH. Compliant with medications. Patient had bloody discharge from her right ear after ECT. Denies any pain. Stated it felt congested and reported worsening hearing loss. Patient was evaluated by the hospitalist twice. VS remain stable. Will continue to monitor.

## 2023-06-23 NOTE — CONSULTS
Hospitalist Consultation Note    NAME: Regis Rubinstein   :  1955   MRN:  [id number]   Room Number: 156/69  @ [facility]     Date/Time:  2023 4:01 PM    Patient PCP: Victoriano Crenshaw MD  ______________________________________________________________________  Given the patient's current clinical presentation, I have a high level of concern for decompensation if discharged from the emergency department. Complex decision making was performed, which includes reviewing the patient's available past medical records, laboratory results, and x-ray films. My assessment of this patient's clinical condition and my plan of care is as follows. Assessment / Plan:       Principal Problem:    Depression  Resolved Problems:    * No resolved hospital problems. *      Right ear bleeding   Otoscopic examination revealed gray intact tympanic membrane, old blood noted in external ear canal, no active bleeding. CT Head did not show base of skull fracture     - continue to clinically monitor  - Medicine will follow up    -Psychiatric treatment and management of health issues,Defer to psychiatrist for further management.  -Continue home meds. -Medically stable at this time. We will follow up on a p.r.n. basis.  -No VTE prophylaxis indicated or warranted at this time. Subjective:   REQUESTING PHYSICIAN : Opal Zuluaga   REASON FOR CONSULT : right ear bleeding     HISTORY OF PRESENT ILLNESS:     Caryle Cords is a 78 y.o.  female with PMH of depression who presents as a transfer from [facility] due to severe depression to undergo ECT treatments. Patient was noted to have bleeding from right ear after ECT. Patient reports having decreased hearing in right ear due to 'genetic' reasons. And that is unchanged from before. She does report some congestion in right ear that was not there before. Patient denies any past medical history except as listed above.  Denies fever,chills,chest pain, sob,abd pan,diarrhea,constipation,lighhadedness. No Hx DM,HTN. No current medical concerns at this time. History reviewed. No pertinent past medical history. Past Surgical History:   Procedure Laterality Date    ELECTROCONVULSIVE THERAPY  6/19/2023    ELECTROCONVULSIVE THERAPY N/A 6/19/2023    ELECTROCONVULSIVE THERAPY performed by Sekou Higgins MD at [address] [place] [address] N/A 6/21/2023    ELECTROCONVULSIVE THERAPY performed by Sekou Higgins MD at [address] [place] [address] THERAPY  6/23/2023    ELECTROCONVULSIVE THERAPY N/A 6/23/2023    ELECTROCONVULSIVE THERAPY performed by Sekou Higgins MD at [facility] MAIN OR       Social History     Tobacco Use    Smoking status: Never    Smokeless tobacco: Not on file   Substance Use Topics    Alcohol use: Never        No family history on file. Allergies   Allergen Reactions    Codeine Dizziness or Vertigo    Fructose Diarrhea    Lac Bovis Other (See Comments)     Lactose  - stomach intolerance          Prior to Admission medications    Medication Sig Start Date End Date Taking? Authorizing Provider   LORazepam (ATIVAN) 0.5 MG tablet Take 1 tablet by mouth 3 times daily for 30 days. Max Daily Amount: 1.5 mg 6/16/23 7/16/23  Graciela Bailey MD   gabapentin (NEURONTIN) 100 MG capsule Take 1 capsule by mouth 3 times daily for 30 days. Max Daily Amount: 300 mg 6/16/23 7/16/23  Graciela Bailey MD   mirtazapine (REMERON) 15 MG tablet Take 1 tablet by mouth nightly 6/16/23 7/16/23  Graciela Bailey MD   lisinopril (PRINIVIL;ZESTRIL) 30 MG tablet Take 1 tablet by mouth daily 6/16/23 7/16/23  Graciela Bailey MD   leucovorin calcium (WELLCOVORIN) 5 MG tablet Take 1 tablet by mouth daily for 12 doses Take 1 tablet by mouth for 3 days after each weekly dose of methotrexate.  6/17/23 6/29/23  Graciela Bailey MD   methotrexate (RHEUMATREX) 15 MG chemo tablet Take 1 tablet by mouth once a week 6/21/23 7/21/23  Graciela Bailey MD   lurasidone

## 2023-06-23 NOTE — ANESTHESIA PRE PROCEDURE
Department of Anesthesiology  Preprocedure Note       Name:  Ray Langford   Age:  78 y.o.  :  1955                                          MRN:  [id number]         Date:  2023      Surgeon: Tila Corrales):  Arsh Merrill MD    Procedure: Procedure(s):  ELECTROCONVULSIVE THERAPY    Medications prior to admission:   Prior to Admission medications    Medication Sig Start Date End Date Taking? Authorizing Provider   LORazepam (ATIVAN) 0.5 MG tablet Take 1 tablet by mouth 3 times daily for 30 days. Max Daily Amount: 1.5 mg 23  Obinna Youngblood MD   gabapentin (NEURONTIN) 100 MG capsule Take 1 capsule by mouth 3 times daily for 30 days. Max Daily Amount: 300 mg 23  Obinna Youngblood MD   mirtazapine (REMERON) 15 MG tablet Take 1 tablet by mouth nightly 23  Obinna Youngblood MD   lisinopril (PRINIVIL;ZESTRIL) 30 MG tablet Take 1 tablet by mouth daily 23  Obinna Youngblood MD   leucovorin calcium (WELLCOVORIN) 5 MG tablet Take 1 tablet by mouth daily for 12 doses Take 1 tablet by mouth for 3 days after each weekly dose of methotrexate. 23  Obinna Youngblood MD   methotrexate (RHEUMATREX) 15 MG chemo tablet Take 1 tablet by mouth once a week 23  Obinna Youngblood MD   lurasidone (LATUDA) 20 MG TABS tablet Take 1 tablet by mouth Daily with supper 23  Obinna Youngblood MD   folic acid (FOLVITE) 1 MG tablet Take 3 tablets by mouth daily    Ar Automatic Reconciliation       Current medications:    No current facility-administered medications for this visit. No current outpatient medications on file.      Facility-Administered Medications Ordered in Other Visits   Medication Dose Route Frequency Provider Last Rate Last Admin    0.9 % sodium chloride infusion   IntraVENous Continuous Scooter Goldberg MD        sodium chloride flush 0.9 % injection 5-40 mL  5-40 mL IntraVENous 2 times per day Scooter Goldberg MD        sodium chloride flush 0.9

## 2023-06-23 NOTE — PERIOP NOTE
Patient's ear noted to have bloody drainage post ECT. Nursing Supervisor and Dr Jacky Dinero made aware and order given to consult for hospitalist to evaluate patients right ear. Patient denies right ear pain. Patient denies hearing lost. Patient denies headache. CT ordered also, patient taken to CT then back to [facility].

## 2023-06-23 NOTE — BH NOTE
Chief Complaint:  I hardly slept last night. Length of Stay: 6 Days    Interval History:  Ms. Samreen Colbert is doing poorly. Says she barely slept last night and woke up feeling \"horrible\". Describes her mood as hopeless and has passive thoughts of dying. She remains isolative to her room and no energy or motivation. Calm and cooperative during the interview. Remains motivated to get started on ECT. At the present time the patient Laura Wade remains compliant with taking medications. Denies any adverse events from taking them and feels they have been beneficial.     6/19 - Laura Wade reports feeling good post ECT having had a headache treated with tylenol. No memory loss. Moods are good currently. Appropriately dressed and groomed. Denies SI/HI/AH/VH. No aggression or violence. Appropriately interactive and aware. Tolerating medications well. Eating and sleeping fairly. Wants to stay off of the Gabapentin. 6/20 - Laura Wade reports feeling good post ECT yesterday, but became restless as the day progressed. Moods are good. Appropriately dressed and groomed. Denies SI/HI/AH/VH. No aggression or violence. Appropriately interactive and aware. Tolerating medications well. Eating and sleeping poorly. 6/21 - Laura Wade reports feeling tired with a migraine post ECT # 2. Motrin helping a little but feels the headache is worse. No memory loss. Moods are depressed 7/10 post procedure. Appropriately dressed and groomed. Denies SI/HI/AH/VH. No aggression or violence. Appropriately interactive and aware. Tolerating medications well. Eating and sleeping fairly. Insight is fair and judgement is fair. 6/22 - Laura Wade reports feeling less spry than after the first ECT however notes improvements overall. Moods are fair. Appropriately dressed and groomed. Concerned about the headache she is getting and if it will be pretreated. Denies SI/HI/AH/VH.   No aggression

## 2023-06-23 NOTE — PLAN OF CARE
Problem: Self Harm/Suicidality  Goal: Will have no self-injury during hospital stay  Description: INTERVENTIONS:  1. Ensure constant observer at bedside with Q15M safety checks  2. Maintain a safe environment  3. Secure patient belongings  4. Ensure family/visitors adhere to safety recommendations  5. Ensure safety tray has been added to patient's diet order  6. Every shift and PRN: Re-assess suicidal risk via Frequent Screener    6/23/2023 1556 by Steve Null RN  Outcome: Progressing  6/23/2023 0452 by Angy Noel RN  Outcome: Progressing  Flowsheets (Taken 6/22/2023 1930)  Will have no self-injury during hospital stay: Maintain a safe environment     Problem: Psychosis  Goal: Will report no hallucinations or delusions  Description: INTERVENTIONS:  1. Administer medication as  ordered  2. Assist with reality testing to support increasing orientation  3. Assess if patient's hallucinations or delusions are encouraging self harm or harm to others and intervene as appropriate  Outcome: Progressing     Problem: Depression  Goal: Will be euthymic at discharge  Description: INTERVENTIONS:  1. Administer medication as ordered  2. Provide emotional support via 1:1 interaction with staff  3. Encourage involvement in milieu/groups/activities  4.  Monitor for social isolation  6/23/2023 1556 by Steve Null RN  Outcome: Not Progressing  6/23/2023 0452 by Angy Noel RN  Outcome: Progressing

## 2023-06-24 PROCEDURE — 1240000000 HC EMOTIONAL WELLNESS R&B

## 2023-06-24 PROCEDURE — 6370000000 HC RX 637 (ALT 250 FOR IP): Performed by: STUDENT IN AN ORGANIZED HEALTH CARE EDUCATION/TRAINING PROGRAM

## 2023-06-24 PROCEDURE — 6370000000 HC RX 637 (ALT 250 FOR IP): Performed by: PSYCHIATRY & NEUROLOGY

## 2023-06-24 RX ORDER — CIPROFLOXACIN AND DEXAMETHASONE 3; 1 MG/ML; MG/ML
4 SUSPENSION/ DROPS AURICULAR (OTIC) 2 TIMES DAILY
Status: DISCONTINUED | OUTPATIENT
Start: 2023-06-24 | End: 2023-06-24 | Stop reason: CLARIF

## 2023-06-24 RX ORDER — CIPROFLOXACIN AND FLUOCINOLONE ACETONIDE .75; .0625 MG/.25ML; MG/.25ML
0.25 SOLUTION AURICULAR (OTIC) 2 TIMES DAILY
Status: DISCONTINUED | OUTPATIENT
Start: 2023-06-24 | End: 2023-06-30

## 2023-06-24 RX ORDER — CIPROFLOXACIN AND FLUOCINOLONE ACETONIDE .75; .0625 MG/.25ML; MG/.25ML
0.25 SOLUTION AURICULAR (OTIC) 2 TIMES DAILY
Status: DISCONTINUED | OUTPATIENT
Start: 2023-06-24 | End: 2023-06-24

## 2023-06-24 RX ADMIN — CIPROFLOXACIN AND FLUOCINOLONE ACETONIDE 0.25 ML: .75; .0625 SOLUTION AURICULAR (OTIC) at 21:45

## 2023-06-24 RX ADMIN — FOLIC ACID 3 MG: 1 TABLET ORAL at 09:24

## 2023-06-24 RX ADMIN — CIPROFLOXACIN AND FLUOCINOLONE ACETONIDE 0.25 ML: .75; .0625 SOLUTION AURICULAR (OTIC) at 14:43

## 2023-06-24 RX ADMIN — LISINOPRIL 30 MG: 5 TABLET ORAL at 09:24

## 2023-06-24 RX ADMIN — MIRTAZAPINE 30 MG: 15 TABLET, FILM COATED ORAL at 21:14

## 2023-06-24 RX ADMIN — CIPROFLOXACIN AND FLUOCINOLONE ACETONIDE 0.25 ML: .75; .0625 SOLUTION AURICULAR (OTIC) at 21:47

## 2023-06-24 RX ADMIN — LURASIDONE HYDROCHLORIDE 40 MG: 40 TABLET, FILM COATED ORAL at 18:39

## 2023-06-24 RX ADMIN — LEUCOVORIN CALCIUM 5 MG: 5 TABLET ORAL at 09:24

## 2023-06-24 RX ADMIN — CIPROFLOXACIN AND FLUOCINOLONE ACETONIDE 0.25 ML: .75; .0625 SOLUTION AURICULAR (OTIC) at 12:30

## 2023-06-24 NOTE — WOUND CARE
WOUND Note: 6/23/23     Follow up for self inflicted lacerations on Left forearm     Chart shows:  Transferred from [facility] for ECT treatment for severe Depression that is resistant to most medications on 6/17/23  History of recent onset of Bipolar disorder     Assessment:   A&O x 4 and reports no pain   Independent and continent. Calm and cooperative. 1. POA self inflicted lacerations to LFA  Multiple lacerations starting on inner wrist to about 2 inches from antecubital area. Patient has about 3 areas with intact sutures. The areas look superficial at this time and are healing w/out s/sx of infection. Patient's skin is thin and fragile. TX: cleansed wound with NSS, applied folded xeroform gauze and covered with dry gauze. Secure with medipore tape. Wound Recommendations:    M-W-F cleanse wound with NSS; apply xeroform gauze and cover with dry 4x4's. Secure with Medipore tape.      Transition of Care: Plan to follow weekly and as needed while admitted to hospital.       Brody Pisano, RN, BSN  Wound Care Nurse, [facility]  [phone]

## 2023-06-24 NOTE — PLAN OF CARE
Problem: Self Harm/Suicidality  Goal: Will have no self-injury during hospital stay  Description: INTERVENTIONS:  1. Ensure constant observer at bedside with Q15M safety checks  2. Maintain a safe environment  3. Secure patient belongings  4. Ensure family/visitors adhere to safety recommendations  5. Ensure safety tray has been added to patient's diet order  6. Every shift and PRN: Re-assess suicidal risk via Frequent Screener    6/24/2023 0751 by Lonnie Stein RN  Outcome: Progressing      Patient alert, calm, cooperative. Denies SI/HI. Denies AVH. Denies pain. Denies anxiety and depression. Patient denies any bleeding in her right ear. No dizziness reported. Dr. Karel Priest was perfect served to have the patient reevaluated. Awaiting for his call. Will continue to follow up. Medication and meal compliant. No distress observed. No concerns expressed at this time. Visible in the community. Q15 minutes and hourly checks ongoing. Laura was seen and examined by Dr. Karel Priest. Ear drops was prescribed and given as ordered. Tolerated well.

## 2023-06-24 NOTE — BH NOTE
Chief Complaint:  I hardly slept last night. Length of Stay: 7 Days    Interval History:  Ms. Luis Plano is doing poorly. Says she barely slept last night and woke up feeling \"horrible\". Describes her mood as hopeless and has passive thoughts of dying. She remains isolative to her room and no energy or motivation. Calm and cooperative during the interview. Remains motivated to get started on ECT. At the present time the patient Laura Wade remains compliant with taking medications. Denies any adverse events from taking them and feels they have been beneficial.     6/19 - Laura Wade reports feeling good post ECT having had a headache treated with tylenol. No memory loss. Moods are good currently. Appropriately dressed and groomed. Denies SI/HI/AH/VH. No aggression or violence. Appropriately interactive and aware. Tolerating medications well. Eating and sleeping fairly. Wants to stay off of the Gabapentin. 6/20 - Laura Wade reports feeling good post ECT yesterday, but became restless as the day progressed. Moods are good. Appropriately dressed and groomed. Denies SI/HI/AH/VH. No aggression or violence. Appropriately interactive and aware. Tolerating medications well. Eating and sleeping poorly. 6/21 - Laura Wade reports feeling tired with a migraine post ECT # 2. Motrin helping a little but feels the headache is worse. No memory loss. Moods are depressed 7/10 post procedure. Appropriately dressed and groomed. Denies SI/HI/AH/VH. No aggression or violence. Appropriately interactive and aware. Tolerating medications well. Eating and sleeping fairly. Insight is fair and judgement is fair. 6/22 - Laura Wade reports feeling less spry than after the first ECT however notes improvements overall. Moods are fair. Appropriately dressed and groomed. Concerned about the headache she is getting and if it will be pretreated. Denies SI/HI/AH/VH.   No aggression

## 2023-06-24 NOTE — CONSULTS
Hospitalist Consultation Note    NAME: Blanquita Dennis   :  1955   MRN:  [id number]   Room Number: 139/76  @ [facility]     Date/Time:  2023 11:28 AM    Patient PCP: Siomara Prajapati MD  ______________________________________________________________________  Given the patient's current clinical presentation, I have a high level of concern for decompensation if discharged from the emergency department. Complex decision making was performed, which includes reviewing the patient's available past medical records, laboratory results, and x-ray films. My assessment of this patient's clinical condition and my plan of care is as follows. Assessment / Plan:       Principal Problem:    Depression  Resolved Problems:    * No resolved hospital problems. *      Right ear bleeding - stable  Right ear congestion  Patient had episode of right ear bleeding yesterday after ECT treatment. Evaluated by hospitalist with autoscope old crusted blood was noted on ear canal.  Repeated otoscope exam today positive for crusted blood in both ears. Tympanic membrane intact in both ears. Tympanic membrane on right ear dull    -Check CBC given patient is on methotrexate to rule out any systemic causes for bleeding  -Treatment for otitis externa  -Outpatient follow-up with ENT  -Recommend evaluation by ENT outpatient before resuming of ECT treatments                  Subjective:   REQUESTING PHYSICIAN : [address] : right ear bleeding     HISTORY OF PRESENT ILLNESS:     Felipe Palma is a 78 y.o.  female with PMH of depression who presents as a transfer from [facility] due to severe depression to undergo ECT treatments. Patient was noted to have bleeding from right ear after ECT. Patient reports having decreased hearing in right ear due to 'genetic' reasons. And that is unchanged from before. She does report some congestion in right ear that was not there before.

## 2023-06-24 NOTE — PLAN OF CARE
Provide emotional support via 1:1 interaction with staff  3. Encourage involvement in milieu/groups/activities  4.  Monitor for social isolation  6/24/2023 0302 by Sharon Majano RN  Outcome: Progressing  6/23/2023 1556 by Lucas Parker RN  Outcome: Not Progressing

## 2023-06-25 LAB
ALBUMIN SERPL-MCNC: 2.7 G/DL (ref 3.5–5)
ALBUMIN/GLOB SERPL: 1 (ref 1.1–2.2)
ALP SERPL-CCNC: 52 U/L (ref 45–117)
ALT SERPL-CCNC: 20 U/L (ref 12–78)
ANION GAP SERPL CALC-SCNC: 5 MMOL/L (ref 5–15)
AST SERPL-CCNC: 13 U/L (ref 15–37)
BASOPHILS # BLD: 0.1 K/UL (ref 0–0.1)
BASOPHILS NFR BLD: 1 % (ref 0–1)
BILIRUB SERPL-MCNC: 0.7 MG/DL (ref 0.2–1)
BUN SERPL-MCNC: 25 MG/DL (ref 6–20)
BUN/CREAT SERPL: 29 (ref 12–20)
CALCIUM SERPL-MCNC: 8 MG/DL (ref 8.5–10.1)
CHLORIDE SERPL-SCNC: 108 MMOL/L (ref 97–108)
CO2 SERPL-SCNC: 30 MMOL/L (ref 21–32)
CREAT SERPL-MCNC: 0.85 MG/DL (ref 0.55–1.02)
DIFFERENTIAL METHOD BLD: ABNORMAL
EOSINOPHIL # BLD: 0.1 K/UL (ref 0–0.4)
EOSINOPHIL NFR BLD: 3 % (ref 0–7)
ERYTHROCYTE [DISTWIDTH] IN BLOOD BY AUTOMATED COUNT: 12.4 % (ref 11.5–14.5)
GLOBULIN SER CALC-MCNC: 2.7 G/DL (ref 2–4)
GLUCOSE SERPL-MCNC: 87 MG/DL (ref 65–100)
HCT VFR BLD AUTO: 33 % (ref 35–47)
HGB BLD-MCNC: 11.1 G/DL (ref 11.5–16)
IMM GRANULOCYTES # BLD AUTO: 0 K/UL (ref 0–0.04)
IMM GRANULOCYTES NFR BLD AUTO: 0 % (ref 0–0.5)
LYMPHOCYTES # BLD: 1.2 K/UL (ref 0.8–3.5)
LYMPHOCYTES NFR BLD: 34 % (ref 12–49)
MCH RBC QN AUTO: 32.9 PG (ref 26–34)
MCHC RBC AUTO-ENTMCNC: 33.6 G/DL (ref 30–36.5)
MCV RBC AUTO: 97.9 FL (ref 80–99)
MONOCYTES # BLD: 0.4 K/UL (ref 0–1)
MONOCYTES NFR BLD: 11 % (ref 5–13)
NEUTS SEG # BLD: 1.8 K/UL (ref 1.8–8)
NEUTS SEG NFR BLD: 51 % (ref 32–75)
NRBC # BLD: 0 K/UL (ref 0–0.01)
NRBC BLD-RTO: 0 PER 100 WBC
PLATELET # BLD AUTO: 265 K/UL (ref 150–400)
PMV BLD AUTO: 10.5 FL (ref 8.9–12.9)
POTASSIUM SERPL-SCNC: 4 MMOL/L (ref 3.5–5.1)
PROT SERPL-MCNC: 5.4 G/DL (ref 6.4–8.2)
RBC # BLD AUTO: 3.37 M/UL (ref 3.8–5.2)
SODIUM SERPL-SCNC: 143 MMOL/L (ref 136–145)
WBC # BLD AUTO: 3.5 K/UL (ref 3.6–11)

## 2023-06-25 PROCEDURE — 6370000000 HC RX 637 (ALT 250 FOR IP): Performed by: PSYCHIATRY & NEUROLOGY

## 2023-06-25 PROCEDURE — 85025 COMPLETE CBC W/AUTO DIFF WBC: CPT

## 2023-06-25 PROCEDURE — 36415 COLL VENOUS BLD VENIPUNCTURE: CPT

## 2023-06-25 PROCEDURE — 6370000000 HC RX 637 (ALT 250 FOR IP): Performed by: STUDENT IN AN ORGANIZED HEALTH CARE EDUCATION/TRAINING PROGRAM

## 2023-06-25 PROCEDURE — 80053 COMPREHEN METABOLIC PANEL: CPT

## 2023-06-25 PROCEDURE — 1240000000 HC EMOTIONAL WELLNESS R&B

## 2023-06-25 RX ADMIN — FOLIC ACID 3 MG: 1 TABLET ORAL at 09:17

## 2023-06-25 RX ADMIN — CIPROFLOXACIN AND FLUOCINOLONE ACETONIDE 0.25 ML: .75; .0625 SOLUTION AURICULAR (OTIC) at 20:21

## 2023-06-25 RX ADMIN — MIRTAZAPINE 30 MG: 15 TABLET, FILM COATED ORAL at 20:26

## 2023-06-25 RX ADMIN — LURASIDONE HYDROCHLORIDE 40 MG: 40 TABLET, FILM COATED ORAL at 17:35

## 2023-06-25 RX ADMIN — CIPROFLOXACIN AND FLUOCINOLONE ACETONIDE 0.25 ML: .75; .0625 SOLUTION AURICULAR (OTIC) at 09:17

## 2023-06-25 RX ADMIN — CIPROFLOXACIN AND FLUOCINOLONE ACETONIDE 0.25 ML: .75; .0625 SOLUTION AURICULAR (OTIC) at 09:18

## 2023-06-25 RX ADMIN — LISINOPRIL 30 MG: 5 TABLET ORAL at 09:17

## 2023-06-25 RX ADMIN — CIPROFLOXACIN AND FLUOCINOLONE ACETONIDE 0.25 ML: .75; .0625 SOLUTION AURICULAR (OTIC) at 20:23

## 2023-06-25 NOTE — PLAN OF CARE
Problem: Self Harm/Suicidality  Goal: Will have no self-injury during hospital stay  Description: INTERVENTIONS:  1. Ensure constant observer at bedside with Q15M safety checks  2. Maintain a safe environment  3. Secure patient belongings  4. Ensure family/visitors adhere to safety recommendations  5. Ensure safety tray has been added to patient's diet order  6. Every shift and PRN: Re-assess suicidal risk via Frequent Screener    6/25/2023 1401 by Lucas Parker RN  Outcome: Progressing  6/25/2023 0104 by Sharon Majano RN  Outcome: Progressing     Problem: Depression  Goal: Will be euthymic at discharge  Description: INTERVENTIONS:  1. Administer medication as ordered  2. Provide emotional support via 1:1 interaction with staff  3. Encourage involvement in milieu/groups/activities  4. Monitor for social isolation  6/25/2023 1401 by Lucas Parker RN  Outcome: Progressing  6/25/2023 0104 by Sharon Majano RN  Outcome: Progressing     Problem: Psychosis  Goal: Will report no hallucinations or delusions  Description: INTERVENTIONS:  1. Administer medication as  ordered  2. Assist with reality testing to support increasing orientation  3. Assess if patient's hallucinations or delusions are encouraging self harm or harm to others and intervene as appropriate  Outcome: Progressing     Problem: Anxiety  Goal: Will report anxiety at manageable levels  Description: INTERVENTIONS:  1. Administer medication as ordered  2. Teach and rehearse alternative coping skills  3.  Provide emotional support with 1:1 interaction with staff  6/25/2023 0104 by Sharon Majano RN  Outcome: Progressing

## 2023-06-25 NOTE — BH NOTE
Met with patient ambulating in the hallway. Bright affect. Euthymic mood. Denies depression and anxiety. Denies SI, HI and AVH. Compliant with medications. Denies pain in right ear. No drainage or signs of infection. Pleasant and cooperative. No acute distress voiced or observed.

## 2023-06-25 NOTE — PLAN OF CARE
Patient met sitting in dayroom during transition of care. Patient presented as calm/cooperative, A/O x4. No concerns reported and no signs of medical or psychiatric distress noted. Patient denied any symptoms of depression/anxiety/hallucinations/ S/I or H/I. Patient denied any ear discomfort, drainage or changes in overall needs. Patient received scheduled HS medications. Observed resting in bed with no concerns. Patient slept for 7.5 hours. Scheduled lab work completed. Problem: Self Harm/Suicidality  Goal: Will have no self-injury during hospital stay  Description: INTERVENTIONS:  1. Ensure constant observer at bedside with Q15M safety checks  2. Maintain a safe environment  3. Secure patient belongings  4. Ensure family/visitors adhere to safety recommendations  5. Ensure safety tray has been added to patient's diet order  6. Every shift and PRN: Re-assess suicidal risk via Frequent Screener    Outcome: Progressing     Problem: Depression  Goal: Will be euthymic at discharge  Description: INTERVENTIONS:  1. Administer medication as ordered  2. Provide emotional support via 1:1 interaction with staff  3. Encourage involvement in milieu/groups/activities  4. Monitor for social isolation  Outcome: Progressing     Problem: Anxiety  Goal: Will report anxiety at manageable levels  Description: INTERVENTIONS:  1. Administer medication as ordered  2. Teach and rehearse alternative coping skills  3.  Provide emotional support with 1:1 interaction with staff  Outcome: Progressing

## 2023-06-25 NOTE — BH NOTE
Chief Complaint:  I hardly slept last night. Length of Stay: 8 Days    Interval History:  Ms. Radha Tanner is doing poorly. Says she barely slept last night and woke up feeling \"horrible\". Describes her mood as hopeless and has passive thoughts of dying. She remains isolative to her room and no energy or motivation. Calm and cooperative during the interview. Remains motivated to get started on ECT. At the present time the patient Laura Wade remains compliant with taking medications. Denies any adverse events from taking them and feels they have been beneficial.     6/19 - Laura Wade reports feeling good post ECT having had a headache treated with tylenol. No memory loss. Moods are good currently. Appropriately dressed and groomed. Denies SI/HI/AH/VH. No aggression or violence. Appropriately interactive and aware. Tolerating medications well. Eating and sleeping fairly. Wants to stay off of the Gabapentin. 6/20 - Laura Wade reports feeling good post ECT yesterday, but became restless as the day progressed. Moods are good. Appropriately dressed and groomed. Denies SI/HI/AH/VH. No aggression or violence. Appropriately interactive and aware. Tolerating medications well. Eating and sleeping poorly. 6/21 - Laura Wade reports feeling tired with a migraine post ECT # 2. Motrin helping a little but feels the headache is worse. No memory loss. Moods are depressed 7/10 post procedure. Appropriately dressed and groomed. Denies SI/HI/AH/VH. No aggression or violence. Appropriately interactive and aware. Tolerating medications well. Eating and sleeping fairly. Insight is fair and judgement is fair. 6/22 - Laura Wade reports feeling less spry than after the first ECT however notes improvements overall. Moods are fair. Appropriately dressed and groomed. Concerned about the headache she is getting and if it will be pretreated. Denies SI/HI/AH/VH.   No aggression or violence. Appropriately interactive and aware. Tolerating medications well. Eating and sleeping fairly. Insight is fair and judgement is fair. 6/23 - Laura Wade reports feeling well and moods are fair. Has a slight headache post ECT # 3 with some bleeding from her right ear. Was pretreated for headache decreasing it significantly from the previous treatments headache. Head CT clear and hospitalist consulted for management. Appropriately dressed and groomed. Denies SI/HI/AH/VH. No aggression or violence. Appropriately interactive and aware. Tolerating medications well. Eating and sleeping fairly. 6/24  - Laura Wade says she is doing well. Calm and pleasant. Denies si hi or avh. Sleeping and eating ok. No agitation or aggression. Compliant with meds and no adverse effects noted. Out in the unit, social with staff and peers. 6/25 - Laura Wade says she is doing well. Calm and pleasant. Denies si hi or avh. Sleeping and eating ok. No agitation or aggression. Compliant with meds and no adverse effects noted. Out in the unit, social with staff and peers. Past Medical History:  History reviewed. No pertinent past medical history.         Labs:  Lab Results   Component Value Date/Time    WBC 3.5 06/25/2023 05:15 AM    HGB 11.1 06/25/2023 05:15 AM    HCT 33.0 06/25/2023 05:15 AM     06/25/2023 05:15 AM    MCV 97.9 06/25/2023 05:15 AM      Lab Results   Component Value Date/Time     06/25/2023 05:15 AM    K 4.0 06/25/2023 05:15 AM     06/25/2023 05:15 AM    CO2 30 06/25/2023 05:15 AM    BUN 25 06/25/2023 05:15 AM    GLOB 2.7 06/25/2023 05:15 AM    ALT 20 06/25/2023 05:15 AM      [unfilled]      Current Facility-Administered Medications   Medication Dose Route Frequency Provider Last Rate Last Admin    ciprofloxacin-fluocinolone PF (OTOVEL) otic solution 0.25 mL  0.25 mL Left Ear BID Damon Brown MD   0.25 mL at 06/25/23 0917    And

## 2023-06-26 PROCEDURE — 6370000000 HC RX 637 (ALT 250 FOR IP): Performed by: PSYCHIATRY & NEUROLOGY

## 2023-06-26 PROCEDURE — 1240000000 HC EMOTIONAL WELLNESS R&B

## 2023-06-26 PROCEDURE — 6370000000 HC RX 637 (ALT 250 FOR IP): Performed by: STUDENT IN AN ORGANIZED HEALTH CARE EDUCATION/TRAINING PROGRAM

## 2023-06-26 RX ADMIN — CIPROFLOXACIN AND FLUOCINOLONE ACETONIDE 0.25 ML: .75; .0625 SOLUTION AURICULAR (OTIC) at 21:19

## 2023-06-26 RX ADMIN — FOLIC ACID 3 MG: 1 TABLET ORAL at 09:25

## 2023-06-26 RX ADMIN — CIPROFLOXACIN AND FLUOCINOLONE ACETONIDE 0.25 ML: .75; .0625 SOLUTION AURICULAR (OTIC) at 21:16

## 2023-06-26 RX ADMIN — CIPROFLOXACIN AND FLUOCINOLONE ACETONIDE 0.25 ML: .75; .0625 SOLUTION AURICULAR (OTIC) at 09:39

## 2023-06-26 RX ADMIN — MIRTAZAPINE 30 MG: 15 TABLET, FILM COATED ORAL at 21:15

## 2023-06-26 RX ADMIN — CIPROFLOXACIN AND FLUOCINOLONE ACETONIDE 0.25 ML: .75; .0625 SOLUTION AURICULAR (OTIC) at 09:37

## 2023-06-26 RX ADMIN — LISINOPRIL 30 MG: 5 TABLET ORAL at 09:25

## 2023-06-26 ASSESSMENT — PAIN SCALES - GENERAL: PAINLEVEL_OUTOF10: 0

## 2023-06-26 NOTE — BH NOTE
Assumed care of patient after receiving shift report from outgoing nurse. No apparent distress. Pt awake in dayroom. Pt current negative for SI/HI/AVH, negative for Depression/Anxiety. Medication and meal compliant. Mood is cam and cooperative. No apparent delusions perceived. Pt alert and oriented. Pt ambulates independently. No current behavioral issues observed. Q15 minute safety continued for safety.

## 2023-06-26 NOTE — PROGRESS NOTES
Behavioral Services                                              Medicare Re-Certification    I certify that the inpatient psychiatric hospital services furnished since the previous certification/re-certification were, and continue to be, medically necessary for;    [x] (1) Treatment which could reasonably be expected to improve the patient's condition,    [x] (2) Or for diagnostic study. Estimated length of stay/service 1 - 2 weeks    Plan for post-hospital care per social work    This patient continues to need, on a daily basis, active treatment furnished directly by or requiring the supervision of inpatient psychiatric personnel.     Electronically signed by Jesica Steele MD on 6/26/2023 at 2:28 PM

## 2023-06-26 NOTE — BH NOTE
Behavioral Health Treatment Team Note     Patient goal(s) for today: To go to group, see my friends  Treatment team focus/goals: Continue ECT, coordinate with family    Progress note: Pt presents ao x 4. Pt denies anxiety, depression, SI/HI/AVH. Pt interactive with peers and staff, pt is visible on the unit and in the day room. Pt presents calm and cooperative. SW had scheduled meeting for pt family to come see her again today and is confirming this with pt. Meeting may happen at 2:30 if pt family is still coming. This is not a formal meeting, rather a visit. SW looking into mobile crisis or MH skill building for pt once she leaves. LUCIE also setting pt up with therapist prior to discharge.     LOS:  9  Expected LOS: 16-21    Insurance info/prescription coverage:  MEDICARE PART A  Date of last family contact:  6/23/2023  Family requesting physician contact today:  No  Discharge plan:  Return home after ECT  Guns in the home:  No  Outpatient provider(s):  Pending     Participating treatment team members: LUIS Grace

## 2023-06-26 NOTE — WOUND CARE
Follow up for self inflicted lacerations on LFA    Areas are healing w/out s/sx infection. Sutures area intact. No longer needs xeroform. Patient took a shower prior to dressing change. Cleansed areas with NSS and applied silicone foam dressing. Sutures were placed on 6/10/23, ED note from Dr. Paula Riggins indicates they need to be removed in 7-10 days. Overdue to be removed. Will remove them today with MD orders. Dr. Samaniego reads as follows:   DECISION MAKING:  Aga Tillman is a 78 y.o. female who comes in as above. On my examination, patient calm and cooperative she is somewhat flat and withdrawn. On examination of the left arm, there are multiple scattered abrasions, superficial lacerations as well as deep lacerations. Wounds were cleansed and repaired, see procedure note. Patient will need sutures removed in 7 to 10 days. Wound Care Recommendations:  As needed clean lacerations on LFA with NSS and cover with silicone foam dressing.      Destinee Esparza RN  Wound Care Nurse [facility]  [phone]

## 2023-06-26 NOTE — BH NOTE
Chief Complaint:  I hardly slept last night. Length of Stay: 9 Days    Interval History:  Ms. Valeria Deshpande is doing poorly. Says she barely slept last night and woke up feeling \"horrible\". Describes her mood as hopeless and has passive thoughts of dying. She remains isolative to her room and no energy or motivation. Calm and cooperative during the interview. Remains motivated to get started on ECT. At the present time the patient Laura Wade remains compliant with taking medications. Denies any adverse events from taking them and feels they have been beneficial.     6/19 - Laura Wade reports feeling good post ECT having had a headache treated with tylenol. No memory loss. Moods are good currently. Appropriately dressed and groomed. Denies SI/HI/AH/VH. No aggression or violence. Appropriately interactive and aware. Tolerating medications well. Eating and sleeping fairly. Wants to stay off of the Gabapentin. 6/20 - Laura Wade reports feeling good post ECT yesterday, but became restless as the day progressed. Moods are good. Appropriately dressed and groomed. Denies SI/HI/AH/VH. No aggression or violence. Appropriately interactive and aware. Tolerating medications well. Eating and sleeping poorly. 6/21 - Laura Wade reports feeling tired with a migraine post ECT # 2. Motrin helping a little but feels the headache is worse. No memory loss. Moods are depressed 7/10 post procedure. Appropriately dressed and groomed. Denies SI/HI/AH/VH. No aggression or violence. Appropriately interactive and aware. Tolerating medications well. Eating and sleeping fairly. Insight is fair and judgement is fair. 6/22 - Laura Wade reports feeling less spry than after the first ECT however notes improvements overall. Moods are fair. Appropriately dressed and groomed. Concerned about the headache she is getting and if it will be pretreated. Denies SI/HI/AH/VH.   No aggression

## 2023-06-26 NOTE — PLAN OF CARE
Problem: Self Harm/Suicidality  Goal: Will have no self-injury during hospital stay  Description: INTERVENTIONS:  1. Ensure constant observer at bedside with Q15M safety checks  2. Maintain a safe environment  3. Secure patient belongings  4. Ensure family/visitors adhere to safety recommendations  5. Ensure safety tray has been added to patient's diet order  6. Every shift and PRN: Re-assess suicidal risk via Frequent Screener    6/25/2023 2228 by Judge Malcolm RN  Outcome: Progressing  6/25/2023 2227 by Judge Malcolm RN  Outcome: Progressing  6/25/2023 1401 by Mikael Bundy RN  Outcome: Progressing     Problem: Depression  Goal: Will be euthymic at discharge  Description: INTERVENTIONS:  1. Administer medication as ordered  2. Provide emotional support via 1:1 interaction with staff  3. Encourage involvement in milieu/groups/activities  4. Monitor for social isolation  6/25/2023 2228 by Judge Malcolm RN  Outcome: Progressing  6/25/2023 2227 by Judge Malcolm RN  Outcome: Progressing  6/25/2023 1401 by Mikael Bundy RN  Outcome: Progressing     Problem: Psychosis  Goal: Will report no hallucinations or delusions  Description: INTERVENTIONS:  1. Administer medication as  ordered  2. Assist with reality testing to support increasing orientation  3. Assess if patient's hallucinations or delusions are encouraging self harm or harm to others and intervene as appropriate  6/25/2023 2228 by Judge Malcolm RN  Outcome: Progressing  6/25/2023 2227 by Judge Malcolm RN  Outcome: Progressing  6/25/2023 1401 by Mikael Bundy RN  Outcome: Progressing     Problem: Behavior  Goal: Pt/Family maintain appropriate behavior and adhere to behavioral management agreement, if implemented  Description: INTERVENTIONS:  1. Assess patient/family's coping skills and  non-compliant behavior (including use of illegal substances)  2.  Notify security of behavior or suspected illegal

## 2023-06-26 NOTE — PLAN OF CARE
Problem: Self Harm/Suicidality  Goal: Will have no self-injury during hospital stay  Description: INTERVENTIONS:  1. Ensure constant observer at bedside with Q15M safety checks  2. Maintain a safe environment  3. Secure patient belongings  4. Ensure family/visitors adhere to safety recommendations  5. Ensure safety tray has been added to patient's diet order  6. Every shift and PRN: Re-assess suicidal risk via Frequent Screener    6/26/2023 1107 by Maryann Waldron RN  Outcome: Progressing  6/25/2023 2228 by Kristy Acosta RN  Outcome: Progressing  6/25/2023 2227 by Kristy Acosta RN  Outcome: Progressing     Problem: Psychosis  Goal: Will report no hallucinations or delusions  Description: INTERVENTIONS:  1. Administer medication as  ordered  2. Assist with reality testing to support increasing orientation  3.  Assess if patient's hallucinations or delusions are encouraging self harm or harm to others and intervene as appropriate  6/25/2023 2228 by Kristy Acosta RN  Outcome: Progressing  6/25/2023 2227 by Kristy Acosta RN  Outcome: Progressing

## 2023-06-26 NOTE — GROUP NOTE
Group Therapy Note    Date: 6/26/2023    Group Start Time: 0900  Group End Time: 1000  Group Topic: Topic Group    [place] [address] [facility] [address] [facility]        Group Therapy Note    Attendees: 3       Patient's Goal:  To identify positive coping strategies    Notes:  Pt did not attend session    Discipline Responsible: Recreational Therapist      Signature:  Katherine Andrade

## 2023-06-26 NOTE — BH NOTE
Received pt visible in the dayroom, playing cards with peers, alert and oriented x 4. Presents calm and cooperative with appropriate affect and sad mood. Pt is cooperative with vital signs check and assessment. Pt denies anxiety, depression, SI/HI/AH/VH. Interacts appropriately and aware. Zero pain nor discharge from ears. Med and meal compliant. Scheduled med given. Q 15 min's rounding is ongoing for safety. Pt slept for 8.15 hours.

## 2023-06-26 NOTE — GROUP NOTE
Group Therapy Note    Date: 6/26/2023    Group Start Time: 6757  Group End Time: 0899  Group Topic: Healthy Living/Wellness    [address] [facility]    Tatum Leal RN        Group Therapy Note    Attendees: Laura Smith     Group was discussing emotional wellness and the importance of quality sleep. We discussed as a group how to achieve better sleep along with the importance. Pt participated in the discussion.     Signature:  Tatum Leal RN

## 2023-06-27 PROCEDURE — 6370000000 HC RX 637 (ALT 250 FOR IP): Performed by: PSYCHIATRY & NEUROLOGY

## 2023-06-27 PROCEDURE — 1240000000 HC EMOTIONAL WELLNESS R&B

## 2023-06-27 PROCEDURE — 6370000000 HC RX 637 (ALT 250 FOR IP): Performed by: STUDENT IN AN ORGANIZED HEALTH CARE EDUCATION/TRAINING PROGRAM

## 2023-06-27 RX ADMIN — CIPROFLOXACIN AND FLUOCINOLONE ACETONIDE 0.25 ML: .75; .0625 SOLUTION AURICULAR (OTIC) at 21:05

## 2023-06-27 RX ADMIN — CIPROFLOXACIN AND FLUOCINOLONE ACETONIDE 0.25 ML: .75; .0625 SOLUTION AURICULAR (OTIC) at 08:26

## 2023-06-27 RX ADMIN — LURASIDONE HYDROCHLORIDE 40 MG: 40 TABLET, FILM COATED ORAL at 18:05

## 2023-06-27 RX ADMIN — FOLIC ACID 3 MG: 1 TABLET ORAL at 08:25

## 2023-06-27 RX ADMIN — CIPROFLOXACIN AND FLUOCINOLONE ACETONIDE 0.25 ML: .75; .0625 SOLUTION AURICULAR (OTIC) at 21:08

## 2023-06-27 RX ADMIN — LISINOPRIL 30 MG: 5 TABLET ORAL at 08:25

## 2023-06-27 RX ADMIN — MIRTAZAPINE 30 MG: 15 TABLET, FILM COATED ORAL at 21:04

## 2023-06-27 ASSESSMENT — PAIN SCALES - GENERAL: PAINLEVEL_OUTOF10: 0

## 2023-06-27 NOTE — GROUP NOTE
Group Therapy Note    Date: 6/27/2023    Group Start Time: 1500  Group End Time: 1600  Group Topic: Recreational    [address] [facility]    [address]        Group Therapy Note    Attendees:        Patient's Goal:  To concentrate on selected task        Status After Intervention:  Improved    Participation Level: Interactive    Participation Quality: Attentive and Sharing      Speech:  normal      Thought Process/Content: Logical      Affective Functioning: Congruent      Mood: euthymic      Level of consciousness:  Attentive      Response to Learning: Progressing to goal      Endings: None Reported    Modes of Intervention: Socialization and Activity      Discipline Responsible: Recreational Therapist      Signature:  Nicolas Marsh

## 2023-06-27 NOTE — BH NOTE
Pt A/O x 4, pleasant and cooperative. Visibile in milieu and interactive with select peers. Denies SI/HI/AVH. Denies anxiety and depression. Compliant with medications and meals. Asking when she will continue ECT. Writer informed patient that would have to be discussed with provider and she is not on the schedule to have ECT today. Pt denies further needs at this time.

## 2023-06-27 NOTE — PROGRESS NOTES
2200: Angelita Alan is awake, visible in the milieu, and interactive with select peers. Laura was pleasant upon approach. Her hygiene is adequate, independent in ADLs and her gait is steady. Pt sleep and appetite patterns are WNL per patient. She denies SI/HI and demonstrates no evidence of intent to harm self or others. Pt denies hallucinations at present. Pt is compliant with scheduled meds. PRN meds given at this time. Pt encouraged to continue to participate in care. Will continue to monitor pt with q 15 min checks and hourly nursing rounds. Pt slept for a total of 8 hrs.

## 2023-06-27 NOTE — GROUP NOTE
Group Therapy Note    Date: 6/27/2023    Group Start Time: 0900  Group End Time: 1000  Group Topic: Topic Group    [address] [facility]        Group Therapy Note    Attendees: 4       Patient's Goal:  To participate in chair exercise routine        Status After Intervention:  Improved    Participation Level: Interactive    Participation Quality: Attentive      Speech:  normal      Thought Process/Content: Logical      Affective Functioning: Congruent      Mood: euthymic      Level of consciousness:  Attentive      Response to Learning: Progressing to goal      Endings: None Reported    Modes of Intervention: Movement      Discipline Responsible: Recreational Therapist      Signature:  Marisela Hernandez

## 2023-06-27 NOTE — PLAN OF CARE
Problem: Self Harm/Suicidality  Goal: Will have no self-injury during hospital stay  Description: INTERVENTIONS:  1. Ensure constant observer at bedside with Q15M safety checks  2. Maintain a safe environment  3. Secure patient belongings  4. Ensure family/visitors adhere to safety recommendations  5. Ensure safety tray has been added to patient's diet order  6. Every shift and PRN: Re-assess suicidal risk via Frequent Screener    Outcome: Progressing     Problem: Depression  Goal: Will be euthymic at discharge  Description: INTERVENTIONS:  1. Administer medication as ordered  2. Provide emotional support via 1:1 interaction with staff  3. Encourage involvement in milieu/groups/activities  4. Monitor for social isolation  Outcome: Progressing     Problem: Behavior  Goal: Pt/Family maintain appropriate behavior and adhere to behavioral management agreement, if implemented  Description: INTERVENTIONS:  1. Assess patient/family's coping skills and  non-compliant behavior (including use of illegal substances)  2. Notify security of behavior or suspected illegal substances which indicate the need for search of the family and/or belongings  3. Encourage verbalization of thoughts and concerns in a socially appropriate manner  4. Utilize positive, consistent limit setting strategies supporting safety of patient, staff and others  5. Encourage participation in the decision making process about the behavioral management agreement  6. If a visitor's behavior poses a threat to safety call refer to organization policy. 7. Initiate consult with , Psychosocial CNS, Spiritual Care as appropriate  Outcome: Progressing     Problem: Anxiety  Goal: Will report anxiety at manageable levels  Description: INTERVENTIONS:  1. Administer medication as ordered  2. Teach and rehearse alternative coping skills  3.  Provide emotional support with 1:1 interaction with staff  Outcome: Progressing     Problem: Involuntary

## 2023-06-27 NOTE — BH NOTE
Chief Complaint:  I hardly slept last night. Length of Stay: 10 Days    Interval History:  Ms. Gagandeep Gan is doing poorly. Says she barely slept last night and woke up feeling \"horrible\". Describes her mood as hopeless and has passive thoughts of dying. She remains isolative to her room and no energy or motivation. Calm and cooperative during the interview. Remains motivated to get started on ECT. At the present time the patient Laura Waed remains compliant with taking medications. Denies any adverse events from taking them and feels they have been beneficial.     6/19 - Laura Wade reports feeling good post ECT having had a headache treated with tylenol. No memory loss. Moods are good currently. Appropriately dressed and groomed. Denies SI/HI/AH/VH. No aggression or violence. Appropriately interactive and aware. Tolerating medications well. Eating and sleeping fairly. Wants to stay off of the Gabapentin. 6/20 - Laura Wade reports feeling good post ECT yesterday, but became restless as the day progressed. Moods are good. Appropriately dressed and groomed. Denies SI/HI/AH/VH. No aggression or violence. Appropriately interactive and aware. Tolerating medications well. Eating and sleeping poorly. 6/21 - Laura Wade reports feeling tired with a migraine post ECT # 2. Motrin helping a little but feels the headache is worse. No memory loss. Moods are depressed 7/10 post procedure. Appropriately dressed and groomed. Denies SI/HI/AH/VH. No aggression or violence. Appropriately interactive and aware. Tolerating medications well. Eating and sleeping fairly. Insight is fair and judgement is fair. 6/22 - Laura Wade reports feeling less spry than after the first ECT however notes improvements overall. Moods are fair. Appropriately dressed and groomed. Concerned about the headache she is getting and if it will be pretreated. Denies SI/HI/AH/VH.   No aggression

## 2023-06-27 NOTE — BH NOTE
Behavioral Health Treatment Team Note     Patient goal(s) for today: to plan next steps  Treatment team focus/goals: Discuss ECT with Dr. Anselmo Boswell note: Pt presents ao x 4. Pt was informed today that her ECT has been discontinued to the ear bleed she experienced during one of her treatments. Pt reports she does not know what she'll do if she can't continue ECT, but reports she overall feels \"more normal\". Dr Carl Lopes will be contacting Dr Rea Hoskins to discuss pt case and see if there are other treatment options for her.     LOS:  10  Expected LOS: 14-20    Insurance info/prescription coverage:  MEDICARE PART A  Date of last family contact:  6/23/2023  Family requesting physician contact today:  No  Discharge plan:  Return home after ECT  Guns in the home:  No  Outpatient provider(s):  Pending     Participating treatment team members: LUIS Grimes

## 2023-06-28 PROCEDURE — 6370000000 HC RX 637 (ALT 250 FOR IP): Performed by: STUDENT IN AN ORGANIZED HEALTH CARE EDUCATION/TRAINING PROGRAM

## 2023-06-28 PROCEDURE — 6370000000 HC RX 637 (ALT 250 FOR IP): Performed by: PSYCHIATRY & NEUROLOGY

## 2023-06-28 PROCEDURE — 6360000002 HC RX W HCPCS: Performed by: PSYCHIATRY & NEUROLOGY

## 2023-06-28 PROCEDURE — 1240000000 HC EMOTIONAL WELLNESS R&B

## 2023-06-28 RX ADMIN — CIPROFLOXACIN AND FLUOCINOLONE ACETONIDE 0.25 ML: .75; .0625 SOLUTION AURICULAR (OTIC) at 08:44

## 2023-06-28 RX ADMIN — METHOTREXATE 15 MG: 2.5 TABLET ORAL at 08:44

## 2023-06-28 RX ADMIN — LISINOPRIL 30 MG: 5 TABLET ORAL at 08:43

## 2023-06-28 RX ADMIN — MIRTAZAPINE 30 MG: 15 TABLET, FILM COATED ORAL at 21:28

## 2023-06-28 RX ADMIN — CIPROFLOXACIN AND FLUOCINOLONE ACETONIDE 0.25 ML: .75; .0625 SOLUTION AURICULAR (OTIC) at 21:28

## 2023-06-28 RX ADMIN — FOLIC ACID 3 MG: 1 TABLET ORAL at 08:43

## 2023-06-28 RX ADMIN — LURASIDONE HYDROCHLORIDE 40 MG: 40 TABLET, FILM COATED ORAL at 16:32

## 2023-06-28 NOTE — CARE COORDINATION
06/28/23 1633   Short Term Goal 1   STG Goal 1 Status: Patient Appears to be  Progressing toward treatment plan goal   Short Term Goal 2   STG Goal 2 Status: Patient Appears to be  Progressing toward treatment plan goal

## 2023-06-28 NOTE — BH NOTE
Writer did referral for therapy. The therapist reports she is needs a more firm discharge date prior to accepting referral for pt. The therapist advised writer to email her when there is a planned discharge date. SW will follow up. Writer will also be making CREST and psychiatry follow up. Writer provided pt with list of several therapists to look through. Writer will also be providing psychiatry for pt to look at.     LUIS Harkins

## 2023-06-28 NOTE — BH NOTE
Behavioral Health Treatment Team Note     Patient goal(s) for today: To figure out my plans  Treatment team focus/goals: ENT consult    Progress note: Pt presents ao x 4. Pt presents with concern regarding her treatment today. Pt wants to continue ECT but needs a consult from ENT doctor prior to being able to do so to see if the ear bleeding is a major issue. Treatment team has appt scheduled for 11:20a this Friday at [facility]  on the 1st floor. SW setting up transportation.     LOS:  11  Expected LOS: TBD    Insurance info/prescription coverage:  MEDICARE PART A  Date of last family contact:  6/23/2023  Family requesting physician contact today:  No  Discharge plan:  Return home after ECT  Guns in the home:  No  Outpatient provider(s):  Pending     Participating treatment team members: LUIS Nichols

## 2023-06-28 NOTE — BH NOTE
Chief Complaint:  I hardly slept last night. Length of Stay: 11 Days    Interval History:  Ms. Gary Crain is doing poorly. Says she barely slept last night and woke up feeling \"horrible\". Describes her mood as hopeless and has passive thoughts of dying. She remains isolative to her room and no energy or motivation. Calm and cooperative during the interview. Remains motivated to get started on ECT. At the present time the patient Laura Wade remains compliant with taking medications. Denies any adverse events from taking them and feels they have been beneficial.     6/19 - Laura Wade reports feeling good post ECT having had a headache treated with tylenol. No memory loss. Moods are good currently. Appropriately dressed and groomed. Denies SI/HI/AH/VH. No aggression or violence. Appropriately interactive and aware. Tolerating medications well. Eating and sleeping fairly. Wants to stay off of the Gabapentin. 6/20 - Laura Wade reports feeling good post ECT yesterday, but became restless as the day progressed. Moods are good. Appropriately dressed and groomed. Denies SI/HI/AH/VH. No aggression or violence. Appropriately interactive and aware. Tolerating medications well. Eating and sleeping poorly. 6/21 - Laura Wade reports feeling tired with a migraine post ECT # 2. Motrin helping a little but feels the headache is worse. No memory loss. Moods are depressed 7/10 post procedure. Appropriately dressed and groomed. Denies SI/HI/AH/VH. No aggression or violence. Appropriately interactive and aware. Tolerating medications well. Eating and sleeping fairly. Insight is fair and judgement is fair. 6/22 - Laura Wade reports feeling less spry than after the first ECT however notes improvements overall. Moods are fair. Appropriately dressed and groomed. Concerned about the headache she is getting and if it will be pretreated. Denies SI/HI/AH/VH.   No aggression Continue current  care  Continue Latuda 40 mg Q daily with meal  Continue Mirtazapine 30 mg PO Q hs  Ativan is 2nd to hydroxyzine for treatment order. If viable with will continue treatments, if not then alternative will be pursued. Disposition planning to continue with social work    A coordinated, multidisplinary treatment team round was conducted with the patient, nurses, pharmcist,  and writer present. Discussions held with , and/or with family members; Complete current electronic health record for patient was reviewed in full including consultant notes, ancillary staff notes, nurses and tech notes, labs and vitals. I certify that this patients inpatient psychiatric hospital services furnished since the previous certification were, and continue to be, required for treatment that could reasonably be expected to improve the patient's condition, or for diagnostic study, and that the patient continues to need, on a daily basis, active treatment furnished directly by or requiring the supervision of inpatient psychiatric facility personnel. In addition, the hospital records show that services furnished were intensive treatment services, admission or related services, or equivalent services.

## 2023-06-28 NOTE — GROUP NOTE
Group Therapy Note    Date: 6/28/2023    Group Start Time: 1500  Group End Time: 1600  Group Topic: Recreational    [address] [facility]    [address]        Group Therapy Note    Attendees:        Patient's Goal:  To concentrate on selected task    Notes:  Pt did not attend session        Signature:  Tucker Seip

## 2023-06-28 NOTE — BH NOTE
Pt pleasant and cooperative. Euthymic mood. Mostly isolative to room reading, but seen intermittently in day room. Compliant with meals and medications. Dried blood noticed in right ear. Pt had visit today that seemed to have gone well. Pt denies SI/HI/AVH. Denies anxiety and depression. Pt denies further needs at this time.

## 2023-06-28 NOTE — GROUP NOTE
Group Therapy Note    Date: 6/28/2023    Group Start Time: 0900  Group End Time: 1000  Group Topic: Topic Group    [address] [facility]        Group Therapy Note    Attendees: 4       Patient's Goal:  To participate in relaxation activity    Notes:  Pt arrived near end of session-,socialized with peers and writer    Speech:  normal      Thought Process/Content: Logical      Affective Functioning: Congruent      Mood: euthymic      Level of consciousness:  Attentive      Endings: None Reported    Modes of Intervention: Activity      Discipline Responsible: Recreational Therapist      Signature:  Marilin Pelayo

## 2023-06-28 NOTE — BH NOTE
Assumed care of the patient. Patient was isolative to her room initially but later she spent sometime in the day room. Patient appeared pleasant and was cooperative with the assessments. Patient denied S.I/H.I/A//VH, anxiety and depression. Patient was medication and meal compliant. No PRN medication was requested or needed. Patient appeared to be sleeping for about 8 hours.

## 2023-06-29 PROBLEM — F32.A DEPRESSION: Status: RESOLVED | Noted: 2023-05-06 | Resolved: 2023-06-29

## 2023-06-29 PROCEDURE — 6370000000 HC RX 637 (ALT 250 FOR IP): Performed by: STUDENT IN AN ORGANIZED HEALTH CARE EDUCATION/TRAINING PROGRAM

## 2023-06-29 PROCEDURE — 1240000000 HC EMOTIONAL WELLNESS R&B

## 2023-06-29 PROCEDURE — 6370000000 HC RX 637 (ALT 250 FOR IP): Performed by: PSYCHIATRY & NEUROLOGY

## 2023-06-29 RX ORDER — CIPROFLOXACIN AND FLUOCINOLONE ACETONIDE .75; .0625 MG/.25ML; MG/.25ML
0.25 SOLUTION AURICULAR (OTIC) 2 TIMES DAILY
Qty: 70 EACH | Refills: 0 | Status: SHIPPED | OUTPATIENT
Start: 2023-06-29 | End: 2023-07-13 | Stop reason: HOSPADM

## 2023-06-29 RX ORDER — LORAZEPAM 0.5 MG/1
0.5 TABLET ORAL 2 TIMES DAILY PRN
Qty: 30 TABLET | Refills: 0 | Status: SHIPPED | OUTPATIENT
Start: 2023-06-29 | End: 2023-07-13 | Stop reason: HOSPADM

## 2023-06-29 RX ORDER — CIPROFLOXACIN AND FLUOCINOLONE ACETONIDE .75; .0625 MG/.25ML; MG/.25ML
0.25 SOLUTION AURICULAR (OTIC) 2 TIMES DAILY
Qty: 42 EACH | Refills: 0 | Status: SHIPPED | OUTPATIENT
Start: 2023-06-29 | End: 2023-07-13 | Stop reason: HOSPADM

## 2023-06-29 RX ORDER — MIRTAZAPINE 30 MG/1
30 TABLET, FILM COATED ORAL NIGHTLY
Qty: 30 TABLET | Refills: 0 | Status: SHIPPED | OUTPATIENT
Start: 2023-06-29 | End: 2023-07-13 | Stop reason: SDUPTHER

## 2023-06-29 RX ORDER — LISINOPRIL 30 MG/1
30 TABLET ORAL DAILY
Qty: 30 TABLET | Refills: 0 | Status: SHIPPED | OUTPATIENT
Start: 2023-06-30 | End: 2023-07-13 | Stop reason: SDUPTHER

## 2023-06-29 RX ORDER — LANOLIN ALCOHOL/MO/W.PET/CERES
9 CREAM (GRAM) TOPICAL NIGHTLY PRN
Qty: 90 TABLET | Refills: 0 | Status: SHIPPED | OUTPATIENT
Start: 2023-06-29 | End: 2023-07-13 | Stop reason: SDUPTHER

## 2023-06-29 RX ORDER — LURASIDONE HYDROCHLORIDE 40 MG/1
40 TABLET, FILM COATED ORAL
Qty: 30 TABLET | Refills: 0 | Status: SHIPPED | OUTPATIENT
Start: 2023-06-29 | End: 2023-07-13 | Stop reason: SDUPTHER

## 2023-06-29 RX ADMIN — LEUCOVORIN CALCIUM 5 MG: 5 TABLET ORAL at 10:25

## 2023-06-29 RX ADMIN — LURASIDONE HYDROCHLORIDE 40 MG: 40 TABLET, FILM COATED ORAL at 17:41

## 2023-06-29 RX ADMIN — CIPROFLOXACIN AND FLUOCINOLONE ACETONIDE 0.25 ML: .75; .0625 SOLUTION AURICULAR (OTIC) at 08:39

## 2023-06-29 RX ADMIN — FOLIC ACID 3 MG: 1 TABLET ORAL at 08:38

## 2023-06-29 RX ADMIN — MIRTAZAPINE 30 MG: 15 TABLET, FILM COATED ORAL at 20:51

## 2023-06-29 RX ADMIN — CIPROFLOXACIN AND FLUOCINOLONE ACETONIDE 0.25 ML: .75; .0625 SOLUTION AURICULAR (OTIC) at 20:55

## 2023-06-29 RX ADMIN — LISINOPRIL 30 MG: 5 TABLET ORAL at 08:38

## 2023-06-29 NOTE — DISCHARGE SUMMARY
PSYCHIATRIC DISCHARGE SUMMARY         IDENTIFICATION:    Patient Name  Jessika Braga   Date of Birth 1955   [facility] [id number]   Medical Record Number  [id number]      Age  78 y.o. PCP Koffi Zepeda MD   Admit date:  6/17/2023    Discharge date: 6/29/2023   Room Number  319/01  @ [facility]   Date of Service  6/29/2023            TYPE OF DISCHARGE: REGULAR               CONDITION AT DISCHARGE: Improved and Fair       PROVISIONAL & DISCHARGE DIAGNOSES:        Active Hospital Problems    *Bipolar disorder ([address])        DISCHARGE DIAGNOSIS:   Axis I:  SEE ABOVE  Axis II: SEE ABOVE  Axis III: SEE ABOVE  Axis IV:  lack of structure  Axis V:  <50 on admission, 55+ on discharge     CC & HISTORY OF PRESENT ILLNESS:  67-year-old  female who is currently admitted after being transferred to this hospital from [facility].  She has had several hospitalizations there recently for depression that has not responded to multiple medications. She was transferred to us with the hope that she can get into the ECT program here. She reports that she has been depressed since the beginning of this year and thinks that this is when her mental illness started. She states that she had a what she describes as a manic episode in 02/2023, and following that she has gone into a depression from which she has struggled to improve. States that she is feeling hopeless, feels depressed almost everyday, struggles with her sleep and has little energy or motivation. Notes that she has been compliant with taking her medications and has tried multiple different antidepressants and augmenting agents recently. This has included taking Lexapro, mirtazapine, Prozac, Seroquel, Latuda, gabapentin as well as lorazepam.  We discussed the procedure of ECT, the adverse effects and benefits and she is agreeable to trying it. Plain CT workup has been mostly completed. There is a normal EKG and normal chest x-ray.   She is

## 2023-06-29 NOTE — GROUP NOTE
Group Therapy Note    Date: 6/29/2023    Group Start Time: 0900  Group End Time: 1000  Group Topic: Topic Group    [address] [facility] [address] [facility]        Group Therapy Note    Attendees:        Patient's Goal:  To participate in mental health [employer] game    Notes:  Pt did not attend session    Discipline Responsible: Recreational Therapist      Signature:  Mauro Garner

## 2023-06-29 NOTE — BH NOTE
Behavioral Health Treatment Team Note     Patient goal(s) for today: to figure out ECT  Treatment team focus/goals: ENT consult    Progress note: Pt presents ao x 4. Pt met in the dayroom. Pt sitting playing cards. Pt presents calm and cooperative, pleasant. Pt presents with normal range of affect. Pt presents with hearing loss in both ears, possibly related to ECT. Doctor and SW spoke with pt about ENT consult. Pt is scheduled to do ENT consult at [facility] tomorrow but  is seeing if ENT doctor can come to this facility to do the consult. SW and doctor asked pt if she is able to continue ECT, would she prefer to do this outpatient. Pt reports she feels more comfortable doing this inpatient, as she recalls, she decompensated rather quickly after her previous discharge and attempted to harm herself. Pt reports she feels safer with herself participating in ECT inpatient. Doctor trying to get consult for clearance.     LOS:  12  Expected LOS: 13-21    Insurance info/prescription coverage:  MEDICARE PART A  Date of last family contact:  6/28/2023  Family requesting physician contact today:  No  Discharge plan:  Return home after ECT  Guns in the home:  No  Outpatient provider(s):  Pending     Participating treatment team members: LUIS Maldonado

## 2023-06-29 NOTE — GROUP NOTE
Group Therapy Note    Date: 6/29/2023    Group Start Time: 1500  Group End Time: 1600  Group Topic: Recreational    [facility] [place] [address] [facility]    [address]        Group Therapy Note    Attendees:        Patient's Goal:  To concentrate on selected task    Notes:  Pt did not attend session    Discipline Responsible: Recreational Therapist      Signature:  Leif Rogers

## 2023-06-29 NOTE — BH NOTE
Chief Complaint:  I hardly slept last night. Length of Stay: 12 Days    Interval History:  Ms. Luis Aguilar is doing poorly. Says she barely slept last night and woke up feeling \"horrible\". Describes her mood as hopeless and has passive thoughts of dying. She remains isolative to her room and no energy or motivation. Calm and cooperative during the interview. Remains motivated to get started on ECT. At the present time the patient Laura Wade remains compliant with taking medications. Denies any adverse events from taking them and feels they have been beneficial.     6/19 - Laura Wade reports feeling good post ECT having had a headache treated with tylenol. No memory loss. Moods are good currently. Appropriately dressed and groomed. Denies SI/HI/AH/VH. No aggression or violence. Appropriately interactive and aware. Tolerating medications well. Eating and sleeping fairly. Wants to stay off of the Gabapentin. 6/20 - Laura Wade reports feeling good post ECT yesterday, but became restless as the day progressed. Moods are good. Appropriately dressed and groomed. Denies SI/HI/AH/VH. No aggression or violence. Appropriately interactive and aware. Tolerating medications well. Eating and sleeping poorly. 6/21 - Laura Wade reports feeling tired with a migraine post ECT # 2. Motrin helping a little but feels the headache is worse. No memory loss. Moods are depressed 7/10 post procedure. Appropriately dressed and groomed. Denies SI/HI/AH/VH. No aggression or violence. Appropriately interactive and aware. Tolerating medications well. Eating and sleeping fairly. Insight is fair and judgement is fair. 6/22 - Laura Wade reports feeling less spry than after the first ECT however notes improvements overall. Moods are fair. Appropriately dressed and groomed. Concerned about the headache she is getting and if it will be pretreated. Denies SI/HI/AH/VH.   No aggression

## 2023-06-29 NOTE — PROGRESS NOTES
Pt was noted in the milieu. She presented alert and oriented, broad affect, mood euthymic. She denied SI/HI/AVH. She was compliant with her scheduled meds. Pt appeared to have slept approx 8 hours during the night. Monitoring for safety and behaviors continues.

## 2023-06-29 NOTE — BH NOTE
Pt A/O x 4, pleasant and cooperative. Euthymic. Mostly reading in room throughout the day but seen playing cards in day room with peer. Had several incoming phone calls throughout the day. Pt denies SI/HI/AVH. Denies anxiety and depression. Denies further needs at this time.

## 2023-06-30 PROCEDURE — 6370000000 HC RX 637 (ALT 250 FOR IP): Performed by: PSYCHIATRY & NEUROLOGY

## 2023-06-30 PROCEDURE — 1240000000 HC EMOTIONAL WELLNESS R&B

## 2023-06-30 PROCEDURE — 6370000000 HC RX 637 (ALT 250 FOR IP): Performed by: STUDENT IN AN ORGANIZED HEALTH CARE EDUCATION/TRAINING PROGRAM

## 2023-06-30 RX ADMIN — FOLIC ACID 3 MG: 1 TABLET ORAL at 08:33

## 2023-06-30 RX ADMIN — Medication 9 MG: at 20:24

## 2023-06-30 RX ADMIN — LURASIDONE HYDROCHLORIDE 40 MG: 40 TABLET, FILM COATED ORAL at 17:14

## 2023-06-30 RX ADMIN — MIRTAZAPINE 30 MG: 15 TABLET, FILM COATED ORAL at 20:25

## 2023-06-30 RX ADMIN — CIPROFLOXACIN AND FLUOCINOLONE ACETONIDE 0.25 ML: .75; .0625 SOLUTION AURICULAR (OTIC) at 08:50

## 2023-06-30 RX ADMIN — CIPROFLOXACIN AND FLUOCINOLONE ACETONIDE 0.25 ML: .75; .0625 SOLUTION AURICULAR (OTIC) at 08:56

## 2023-06-30 RX ADMIN — LEUCOVORIN CALCIUM 5 MG: 5 TABLET ORAL at 08:50

## 2023-06-30 RX ADMIN — LISINOPRIL 30 MG: 5 TABLET ORAL at 08:33

## 2023-06-30 NOTE — BH NOTE
Patient alert and verbal. Patient discharged to an ENT appointment and will return for a direct admit. Patient received discharge instructions. Patient left via cab. No acute distress voiced or observed.

## 2023-06-30 NOTE — GROUP NOTE
Group Therapy Note    Date: 6/30/2023    Group Start Time: 1500  Group End Time: 1600  Group Topic: Recreational    [address] [facility]    [address]        Group Therapy Note    Attendees:        Patient's Goal:  To concentrate on selected task    Notes:  Pt did not attend session    Discipline Responsible: Recreational Therapist      Signature:  Raquel Francis

## 2023-06-30 NOTE — PLAN OF CARE
Problem: Discharge Planning  Goal: Discharge to home or other facility with appropriate resources  Outcome: Adequate for Discharge     Problem: Self Harm/Suicidality  Goal: Will have no self-injury during hospital stay  Description: INTERVENTIONS:  1. Ensure constant observer at bedside with Q15M safety checks  2. Maintain a safe environment  3. Secure patient belongings  4. Ensure family/visitors adhere to safety recommendations  5. Ensure safety tray has been added to patient's diet order  6. Every shift and PRN: Re-assess suicidal risk via Frequent Screener    Outcome: Adequate for Discharge     Problem: Depression  Goal: Will be euthymic at discharge  Description: INTERVENTIONS:  1. Administer medication as ordered  2. Provide emotional support via 1:1 interaction with staff  3. Encourage involvement in milieu/groups/activities  4. Monitor for social isolation  Outcome: Adequate for Discharge     Problem: Psychosis  Goal: Will report no hallucinations or delusions  Description: INTERVENTIONS:  1. Administer medication as  ordered  2. Assist with reality testing to support increasing orientation  3. Assess if patient's hallucinations or delusions are encouraging self harm or harm to others and intervene as appropriate  Outcome: Adequate for Discharge     Problem: Behavior  Goal: Pt/Family maintain appropriate behavior and adhere to behavioral management agreement, if implemented  Description: INTERVENTIONS:  1. Assess patient/family's coping skills and  non-compliant behavior (including use of illegal substances)  2. Notify security of behavior or suspected illegal substances which indicate the need for search of the family and/or belongings  3. Encourage verbalization of thoughts and concerns in a socially appropriate manner  4. Utilize positive, consistent limit setting strategies supporting safety of patient, staff and others  5.  Encourage participation in the decision making process about the

## 2023-06-30 NOTE — BH NOTE
Chief Complaint:  I hardly slept last night. Length of Stay: 13 Days    Interval History:  Ms. Casa Turner is doing poorly. Says she barely slept last night and woke up feeling \"horrible\". Describes her mood as hopeless and has passive thoughts of dying. She remains isolative to her room and no energy or motivation. Calm and cooperative during the interview. Remains motivated to get started on ECT. At the present time the patient Laura Wade remains compliant with taking medications. Denies any adverse events from taking them and feels they have been beneficial.     6/19 - Laura Wade reports feeling good post ECT having had a headache treated with tylenol. No memory loss. Moods are good currently. Appropriately dressed and groomed. Denies SI/HI/AH/VH. No aggression or violence. Appropriately interactive and aware. Tolerating medications well. Eating and sleeping fairly. Wants to stay off of the Gabapentin. 6/20 - Laura Wade reports feeling good post ECT yesterday, but became restless as the day progressed. Moods are good. Appropriately dressed and groomed. Denies SI/HI/AH/VH. No aggression or violence. Appropriately interactive and aware. Tolerating medications well. Eating and sleeping poorly. 6/21 - Laura Wade reports feeling tired with a migraine post ECT # 2. Motrin helping a little but feels the headache is worse. No memory loss. Moods are depressed 7/10 post procedure. Appropriately dressed and groomed. Denies SI/HI/AH/VH. No aggression or violence. Appropriately interactive and aware. Tolerating medications well. Eating and sleeping fairly. Insight is fair and judgement is fair. 6/22 - Laura Wade reports feeling less spry than after the first ECT however notes improvements overall. Moods are fair. Appropriately dressed and groomed. Concerned about the headache she is getting and if it will be pretreated. Denies SI/HI/AH/VH.   No aggression or violence. Appropriately interactive and aware. Tolerating medications well. Eating and sleeping fairly. Insight is fair and judgement is fair. 6/23 - Laurasuad Wade reports feeling well and moods are fair. Has a slight headache post ECT # 3 with some bleeding from her right ear. Was pretreated for headache decreasing it significantly from the previous treatments headache. Head CT clear and hospitalist consulted for management. Appropriately dressed and groomed. Denies SI/HI/AH/VH. No aggression or violence. Appropriately interactive and aware. Tolerating medications well. Eating and sleeping fairly. 6/24  - Laura Wade says she is doing well. Calm and pleasant. Denies si hi or avh. Sleeping and eating ok. No agitation or aggression. Compliant with meds and no adverse effects noted. Out in the unit, social with staff and peers. 6/25 - Laura Wade says she is doing well. Calm and pleasant. Denies si hi or avh. Sleeping and eating ok. No agitation or aggression. Compliant with meds and no adverse effects noted. Out in the unit, social with staff and peers. 6/26 - Laura DAVID Wade reports feeling alright and moods are fair. Playing solitaire thinking about what she will do when at home. Discussed her hobbies likes and wants. Looking forward to ECT hoping not to have any bleeding from her ears. Appropriately dressed and groomed. Denies SI/HI/AH/VH. No aggression or violence. Appropriately interactive and aware. Tolerating medications well. Eating and sleeping fairly. 6/27 - Laura HERNANDEZ Sheri reports feeling well and moods are normal.  Concerned that she did not get ECT today and what that means for her long term plans. Appropriately dressed and groomed. Denies SI/HI/AH/VH. No aggression or violence. Appropriately interactive and aware. Tolerating medications well. Eating and sleeping fairly.     6/28 - Laura HERNANDEZ Sheri reports feeling depersonalized like

## 2023-06-30 NOTE — BH NOTE
DISCHARGE SUMMARY    NAME:Laura Wade  : 1955  MRN: [id number]    The patient Laura Wade exhibits the ability to control behavior in a less restrictive environment. Patient's level of functioning is improving. No assaultive/destructive behavior has been observed for the past 24 hours. No suicidal/homicidal threat or behavior has been observed for the past 24 hours. There is no evidence of serious medication side effects. Patient has not been in physical or protective restraints for at least the past 24 hours. If weapons involved, how are they secured? No weapons    Is patient aware of and in agreement with discharge plan? Yes    Copy of discharge instructions to provider?:  Yes    Arrangements for transportation home:  Hospital to home    Keep all follow up appointments as scheduled, continue to take prescribed medications per physician instructions.   Mental health crisis number:  117 or your local mental health crisis line number at [address] [facility] WARM LINE      9-413-130-MHAV ([phone])      M-F: 9am to 9pm      Sat & Sun: 5pm - 9pm  National suicide prevention lines:                             9-618-SIRAWBL ([phone])       6-301-269-TALK ([phone])    Crisis Text Line:  Text HOME to 252839

## 2023-06-30 NOTE — PROGRESS NOTES
Pt was noted in the milieu. She presented alert and oriented, broad affect, mood euthymic. She denied SI/HI/AVH. She was compliant with her scheduled meds. Pt appeared to have slept approx 8 hours during the night. No distress observed. Monitoring for safety and behaviors continues.

## 2023-06-30 NOTE — GROUP NOTE
Group Therapy Note    Date: 2023    Group Start Time: 0840  Group End Time:   Group Topic: Community Meeting    [address] 3 ACUTE BEHAV [place]        Group Therapy Note    Attendees: 7         Patient's Goal:  ***    Notes:  ***    Status After Intervention:  {Status After Intervention:676737524}    Participation Level: {Participation Level:176669815}    Participation Quality: {[facility] PARTICIPATION QUALITY:715489789}      Speech:  {ED  CD_SPEECH:19770}      Thought Process/Content: {Thought Process/Content:828264220}      Affective Functioning: {Affective Functionin}      Mood: {Mood:285983650}      Level of consciousness:  {Level of consciousness:297829148}      Response to Learnin [address] [facility] Responses to Learnin}      Endings: {[facility] Endings:59602}    Modes of Intervention: {[facility] BHI Modes of Intervention:170918600}      Discipline Responsible: {[facility] Multidisciplinary:758800940}      Signature:  Chaparro Fitzpatrick

## 2023-06-30 NOTE — BH NOTE
Group Therapy Note      Group Therapy Note     Date: 6/30/2023  Start Time: 2:30pm  End Time:  3:30pm  Number of Participants: 4     Type of Group: Psychoeducation on the Cognitive Model      SW was able to         Participation Quality: Appropriate        Speech:  normal        Thought Process/Content: Logical        Affective Functioning: Congruent        Mood: anxious        Level of consciousness:  Alert        Response to Learning: Able to verbalize current knowledge/experience        Modes of Intervention: Education        Discipline Responsible: /Counselor        Signature:  Sheeba Jimenez

## 2023-07-01 PROCEDURE — 6370000000 HC RX 637 (ALT 250 FOR IP): Performed by: PSYCHIATRY & NEUROLOGY

## 2023-07-01 PROCEDURE — 6370000000 HC RX 637 (ALT 250 FOR IP): Performed by: NURSE PRACTITIONER

## 2023-07-01 PROCEDURE — 99232 SBSQ HOSP IP/OBS MODERATE 35: CPT | Performed by: PSYCHIATRY & NEUROLOGY

## 2023-07-01 PROCEDURE — 1240000000 HC EMOTIONAL WELLNESS R&B

## 2023-07-01 RX ADMIN — FOLIC ACID 3 MG: 1 TABLET ORAL at 09:12

## 2023-07-01 RX ADMIN — HYDROXYZINE HYDROCHLORIDE 10 MG: 10 TABLET ORAL at 12:15

## 2023-07-01 RX ADMIN — LEUCOVORIN CALCIUM 5 MG: 5 TABLET ORAL at 10:41

## 2023-07-01 RX ADMIN — MIRTAZAPINE 30 MG: 15 TABLET, FILM COATED ORAL at 21:15

## 2023-07-01 RX ADMIN — LISINOPRIL 30 MG: 5 TABLET ORAL at 09:13

## 2023-07-01 RX ADMIN — LURASIDONE HYDROCHLORIDE 40 MG: 40 TABLET, FILM COATED ORAL at 17:05

## 2023-07-01 NOTE — PLAN OF CARE
Problem: Anxiety  Goal: Will report anxiety at manageable levels  Description: INTERVENTIONS:  1. Administer medication as ordered  2. Teach and rehearse alternative coping skills  3. Provide emotional support with 1:1 interaction with staff  Outcome: Progressing     Problem: Coping  Goal: Pt/Family able to verbalize concerns and demonstrate effective coping strategies  Description: INTERVENTIONS:  1. Assist patient/family to identify coping skills, available support systems and cultural and spiritual values  2. Provide emotional support, including active listening and acknowledgement of concerns of patient and caregivers  3. Reduce environmental stimuli, as able  4. Instruct patient/family in relaxation techniques, as appropriate  5.  Assess for spiritual pain/suffering and initiate Spiritual Care, Psychosocial Clinical Specialist consults as needed  6/30/2023 2228 by Rozina Vasquez RN  Outcome: Progressing  6/30/2023 2227 by Rozina Vasquez RN  Outcome: Progressing

## 2023-07-01 NOTE — BH NOTE
Met with patient ambulating in the hallway. Flat affect. Depressed mood. Endorses anxiety. Denies SI, HI and AVH. Compliant with medications. PRN Atarax given. Pleasant and cooperative. Will continue to monitor.

## 2023-07-01 NOTE — PLAN OF CARE
Problem: Anxiety  Goal: Will report anxiety at manageable levels  Description: INTERVENTIONS:  1. Administer medication as ordered  2. Teach and rehearse alternative coping skills  3. Provide emotional support with 1:1 interaction with staff  Outcome: Progressing     Problem: Confusion  Goal: Confusion, delirium, dementia, or psychosis is improved or at baseline  Description: INTERVENTIONS:  1. Assess for possible contributors to thought disturbance, including medications, impaired vision or hearing, underlying metabolic abnormalities, dehydration, psychiatric diagnoses, and notify attending LIP  2. [place] high risk fall precautions, as indicated  3. Provide frequent short contacts to provide reality reorientation, refocusing and direction  4. Decrease environmental stimuli, including noise as appropriate  5. Monitor and intervene to maintain adequate nutrition, hydration, elimination, sleep and activity  6. If unable to ensure safety without constant attention obtain sitter and review sitter guidelines with assigned personnel  7. Initiate Psychosocial CNS and Spiritual Care consult, as indicated  Outcome: Progressing     Problem: Depression/Self Harm  Goal: Effect of psychiatric condition will be minimized and patient will be protected from self harm  Description: INTERVENTIONS:  1. Assess impact of patient's symptoms on level of functioning, self care needs and offer support as indicated  2. Assess patient/family knowledge of depression, impact on illness and need for teaching  3. Provide emotional support, presence and reassurance  4. Assess for possible suicidal thoughts or ideation. If patient expresses suicidal thoughts or statements do not leave alone, initiate Suicide Precautions, move to a room close to the nursing station and obtain sitter  5.  Initiate consults as appropriate with Mental Health Professional, Spiritual Care, Psychosocial CNS, and consider a recommendation to the LIP for a Psychiatric

## 2023-07-01 NOTE — BH NOTE
Writer met patient in the day room watching TV with peers, alert and  oriented x 4. Pt communicated appropriately, not in any psych or medical distress. Pt verbalized feeling okay. On assessment, denied anxiety, depression,  SI, HI, and AVH. Remained calm and cooperative with care. PRN melatonin administered. Med's and meals compliant. No behaviors noted. Vital signs entered. Rounding in progress. Pt slept for the total of 8:15 hours.

## 2023-07-02 PROCEDURE — 1240000000 HC EMOTIONAL WELLNESS R&B

## 2023-07-02 PROCEDURE — 6370000000 HC RX 637 (ALT 250 FOR IP): Performed by: PSYCHIATRY & NEUROLOGY

## 2023-07-02 PROCEDURE — 6370000000 HC RX 637 (ALT 250 FOR IP): Performed by: NURSE PRACTITIONER

## 2023-07-02 PROCEDURE — 99231 SBSQ HOSP IP/OBS SF/LOW 25: CPT | Performed by: PSYCHIATRY & NEUROLOGY

## 2023-07-02 RX ADMIN — LISINOPRIL 30 MG: 5 TABLET ORAL at 08:54

## 2023-07-02 RX ADMIN — MIRTAZAPINE 30 MG: 15 TABLET, FILM COATED ORAL at 21:08

## 2023-07-02 RX ADMIN — LURASIDONE HYDROCHLORIDE 40 MG: 40 TABLET, FILM COATED ORAL at 17:29

## 2023-07-02 RX ADMIN — HYDROXYZINE HYDROCHLORIDE 10 MG: 10 TABLET ORAL at 12:37

## 2023-07-02 RX ADMIN — FOLIC ACID 3 MG: 1 TABLET ORAL at 08:54

## 2023-07-02 NOTE — BH NOTE
Pt is alert and oriented x 4. She is calm and cooperative. Pt denies all SI/HI, AVH, anxiety and depression. Pt is meal and med compliant. She completed visits with 3 family members without incident. Monitoring will continue for changes.

## 2023-07-02 NOTE — PLAN OF CARE
Patient met sitting in her room reading a book. Patient presented as cooperative and calm, alert/oriented X4. Patient denied any  immediate concerns. Patient denied symptoms of depression/anxiety/hallucinations, S/I or H/I. Patient was given HS medications with no concerns. Patient slept for 8.5 hours. Problem: Anxiety  Goal: Will report anxiety at manageable levels  Description: INTERVENTIONS:  1. Administer medication as ordered  2. Teach and rehearse alternative coping skills  3. Provide emotional support with 1:1 interaction with staff  7/2/2023 0222 by Kasie Hodges RN  Outcome: Progressing  7/1/2023 1424 by Sherren Coulter, RN  Outcome: Progressing     Problem: Coping  Goal: Pt/Family able to verbalize concerns and demonstrate effective coping strategies  Description: INTERVENTIONS:  1. Assist patient/family to identify coping skills, available support systems and cultural and spiritual values  2. Provide emotional support, including active listening and acknowledgement of concerns of patient and caregivers  3. Reduce environmental stimuli, as able  4. Instruct patient/family in relaxation techniques, as appropriate  5. Assess for spiritual pain/suffering and initiate Spiritual Care, Psychosocial Clinical Specialist consults as needed  Outcome: Progressing     Problem: Confusion  Goal: Confusion, delirium, dementia, or psychosis is improved or at baseline  Description: INTERVENTIONS:  1. Assess for possible contributors to thought disturbance, including medications, impaired vision or hearing, underlying metabolic abnormalities, dehydration, psychiatric diagnoses, and notify attending LIP  2. [place] high risk fall precautions, as indicated  3. Provide frequent short contacts to provide reality reorientation, refocusing and direction  4. Decrease environmental stimuli, including noise as appropriate  5.  Monitor and intervene to maintain adequate nutrition, hydration, elimination, sleep and

## 2023-07-02 NOTE — BH NOTE
violence. Appropriately interactive and aware. Tolerating medications well. Eating and sleeping fairly. Insight is fair and judgement is fair. 6/23 - Laurasuad Wade reports feeling well and moods are fair. Has a slight headache post ECT # 3 with some bleeding from her right ear. Was pretreated for headache decreasing it significantly from the previous treatments headache. Head CT clear and hospitalist consulted for management. Appropriately dressed and groomed. Denies SI/HI/AH/VH. No aggression or violence. Appropriately interactive and aware. Tolerating medications well. Eating and sleeping fairly. 6/24  - Laura DAVID Wade says she is doing well. Calm and pleasant. Denies si hi or avh. Sleeping and eating ok. No agitation or aggression. Compliant with meds and no adverse effects noted. Out in the unit, social with staff and peers. 6/25 - Laura Wade says she is doing well. Calm and pleasant. Denies si hi or avh. Sleeping and eating ok. No agitation or aggression. Compliant with meds and no adverse effects noted. Out in the unit, social with staff and peers. 6/26 - Laura DAVID Wade reports feeling alright and moods are fair. Playing solitaire thinking about what she will do when at home. Discussed her hobbies likes and wants. Looking forward to ECT hoping not to have any bleeding from her ears. Appropriately dressed and groomed. Denies SI/HI/AH/VH. No aggression or violence. Appropriately interactive and aware. Tolerating medications well. Eating and sleeping fairly. 6/27 - Laura HERNANDEZ Sheri reports feeling well and moods are normal.  Concerned that she did not get ECT today and what that means for her long term plans. Appropriately dressed and groomed. Denies SI/HI/AH/VH. No aggression or violence. Appropriately interactive and aware. Tolerating medications well. Eating and sleeping fairly.     6/28 - Laura HERNANDEZ Sheri reports feeling depersonalized like after her ENT appointment and cleared to resume ECT. She is medication compliant, slept 8 hours overnight and is able to make her needs known. She states she is feeling better and is eager to resume ECT next week. Discussed next steps including the OR schedule for the holiday weekend. Patent got no PRNs for agitation or aggression. Past Medical History:  History reviewed. No pertinent past medical history.         Labs:  Lab Results   Component Value Date/Time    WBC 3.5 06/25/2023 05:15 AM    HGB 11.1 06/25/2023 05:15 AM    HCT 33.0 06/25/2023 05:15 AM     06/25/2023 05:15 AM    MCV 97.9 06/25/2023 05:15 AM      Lab Results   Component Value Date/Time     06/25/2023 05:15 AM    K 4.0 06/25/2023 05:15 AM     06/25/2023 05:15 AM    CO2 30 06/25/2023 05:15 AM    BUN 25 06/25/2023 05:15 AM    GLOB 2.7 06/25/2023 05:15 AM    ALT 20 06/25/2023 05:15 AM          Current Facility-Administered Medications   Medication Dose Route Frequency Provider Last Rate Last Admin    Hydrogen Peroxide 3% Solution  (Patient Supplied)  2 drop Right Ear BID PRN Kae Piedra MD   2 drop at 07/01/23 0916    mirtazapine (REMERON) tablet 30 mg  30 mg Oral Nightly Kae Piedra MD   30 mg at 07/01/23 2115    leucovorin calcium (WELLCOVORIN) tablet 5 mg  5 mg Oral Once per day on Thu Fri Sat Kae Piedra MD   5 mg at 07/01/23 1041    lurasidone (LATUDA) tablet 40 mg  40 mg Oral Dinner Kae Piedra MD   40 mg at 07/01/23 1705    melatonin tablet 9 mg  9 mg Oral Nightly PRN Curt Gloria MD   9 mg at 06/30/23 2024    acetaminophen (TYLENOL) tablet 650 mg  650 mg Oral Q4H PRN JENN Yuan NP   650 mg at 06/21/23 0956    polyethylene glycol (GLYCOLAX) packet 17 g  17 g Oral Daily PRN JENN Tucker NP        aluminum & magnesium hydroxide-simethicone (MAALOX) 200-200-20 MG/5ML suspension 30 mL  30 mL Oral Q6H RAINN JENN Tucker - ELENI        hydrOXYzine HCl (ATARAX)

## 2023-07-03 PROCEDURE — 6370000000 HC RX 637 (ALT 250 FOR IP): Performed by: PSYCHIATRY & NEUROLOGY

## 2023-07-03 PROCEDURE — 1240000000 HC EMOTIONAL WELLNESS R&B

## 2023-07-03 RX ADMIN — MIRTAZAPINE 30 MG: 15 TABLET, FILM COATED ORAL at 21:15

## 2023-07-03 RX ADMIN — LURASIDONE HYDROCHLORIDE 40 MG: 40 TABLET, FILM COATED ORAL at 16:50

## 2023-07-03 RX ADMIN — FOLIC ACID 3 MG: 1 TABLET ORAL at 08:09

## 2023-07-03 RX ADMIN — LISINOPRIL 30 MG: 5 TABLET ORAL at 08:09

## 2023-07-03 NOTE — PERIOP NOTE
Spoke with Emelina Patel to advise patient scheduled for ECT procedure 7/5/2023, NPO after midnight, arrive PACU 0630.

## 2023-07-03 NOTE — WOUND CARE
Follow up for self inflicted lacerations on [facility]      Wound Care Recommendations:  Open to air; lacerations healed. No follow up needed.       Destinee Esparza RN  Wound Care Nurse [facility]  [phone]

## 2023-07-03 NOTE — GROUP NOTE
Group Therapy Note    Date: 7/3/2023    Group Start Time: 0900  Group End Time: 1000  Group Topic: Topic Group    [address] 3 ACUTE BEHAV [address] [facility]        Group Therapy Note    Attendees:        Patient's Goal:  To participate in chair exercise routine    Status After Intervention:  Improved    Participation Level: Interactive    Participation Quality: Attentive and Sharing      Speech:  normal      Thought Process/Content: Logical      Affective Functioning: Congruent      Mood: euthymic      Level of consciousness:  Attentive      Response to Learning: Progressing to goal      Endings: None Reported    Modes of Intervention: Movement      Discipline Responsible: Recreational Therapist      Signature:  Tucker Seip

## 2023-07-03 NOTE — PROGRESS NOTES
Comprehensive Nutrition Assessment     Type and Reason for Visit:  Follow Up     Nutrition Recommendations/Plan:   Diet as tolerated, advance as medically appropriate with extra protein and dessert on meal trays, please  Supplements ordered for meal trays            Nutrition Assessment:    Pt [facility] to [facility], reports recent wt loss 2' depression and poor appetite. Here for ECT. Hx unremarkable per nutrition ex for low weight/BMI; now requesting hot meals instead of just sandwiches. Nutrition Related Findings:    Pt tolerating diet when not NPO for ECT Wound Type: None        Current Nutrition Intake & Therapies:    Average Meal Intake: 51-75%  Average Supplements Intake: None Ordered  Diet NPO     Anthropometric Measures:  Height: 170.1 cm (5' 6.97\")  Ideal Body Weight (IBW): 135 lbs (61 kg)    Current Body Weight: 115 lb  (52.2 kg), 85 % IBW.  Weight Source: Standing Scale  Current BMI (kg/m2): 18.01  Weight Adjustment For: No Adjustment  BMI Categories: Underweight (BMI less than 22) age over 72     Estimated Daily Nutrient Needs:  Energy Requirements Based On: Formula  Weight Used for Energy Requirements: Current  Energy (kcal/day): 1830  Weight Used for Protein Requirements: Current  Protein (g/day): 70  Method Used for Fluid Requirements: 1 ml/kcal  Fluid (ml/day): 4584     Nutrition Diagnosis:   Inadequate protein-energy intake, In context of social or environmental circumstances, Unintended weight loss related to cognitive or neurological impairment, inadequate protein-energy intake as evidenced by intake 51-75%, poor intake prior to admission, BMI, weight loss     Nutrition Interventions:   Food and/or Nutrient Delivery: Modify Current Diet, Start Oral Nutrition Supplement  Nutrition Education/Counseling: No recommendation at this time  Coordination of Nutrition Care: Continue to monitor while inpatient     Goals:  Previous Goal Met: Progressing toward Goal(s)  Goals: PO intake 50% or greater, by next

## 2023-07-03 NOTE — PLAN OF CARE
Patient sitting in dayroom during transition of care. Patient cooperative in meeting tx needs. Patient expressed no immediate concerns and displayed no signs of distress. Patient denied any symptoms of depression/anxiety/hallucinations/S/I or H/I. Patient up and active on the unit until receiving HS medications. Patient resting throughout the night. Patient slept for 7.45 hours. Problem: Anxiety  Goal: Will report anxiety at manageable levels  Description: INTERVENTIONS:  1. Administer medication as ordered  2. Teach and rehearse alternative coping skills  3. Provide emotional support with 1:1 interaction with staff  Outcome: Progressing     Problem: Coping  Goal: Pt/Family able to verbalize concerns and demonstrate effective coping strategies  Description: INTERVENTIONS:  1. Assist patient/family to identify coping skills, available support systems and cultural and spiritual values  2. Provide emotional support, including active listening and acknowledgement of concerns of patient and caregivers  3. Reduce environmental stimuli, as able  4. Instruct patient/family in relaxation techniques, as appropriate  5.  Assess for spiritual pain/suffering and initiate Spiritual Care, Psychosocial Clinical Specialist consults as needed  Outcome: Progressing

## 2023-07-03 NOTE — BH NOTE
Behavioral Health Treatment Team Note     Patient goal(s) for today: to discuss my plan  Treatment team focus/goals: Continue ECT, discharge plan    Progress note: Pt presents ao x 4. Pt presents calm and cooperative, with pleasant demeanor. Pt has 5 more ECT sessions. Pt informed treatment team that she would like to return home this week and continue ECT outpatient rather than complete all of it in the hospital. Pt reports she would like to see how her ECT goes this week, and if she continues to improve she would like to discharge Friday and continue ECT outpatient. Pt reports she discussed her plans with her brother, friends, and rest of support system. Pt reports her support system is on board with this plan. SW will try to put mobile crisis in place to see pt this weekend and next week, SW also scheduling pt follow up psychiatry and therapy appointments to begin after pt has completed all 8 ECT treatments so as to not conflict with her outpatient appointments. Tentative discharge date for Friday.     LOS:  16  Expected LOS: 20    Insurance info/prescription coverage:  MEDICARE PART A  Date of last family contact:  6/28/2023  Family requesting physician contact today:  No  Discharge plan:  Return home after ECT  Guns in the home:  No  Outpatient provider(s):  Pending     Participating treatment team members: LUIS Chamberlain

## 2023-07-03 NOTE — BH NOTE
SW called:      Ced Enriquez  [facility], [place], [address] [place] [address]  [place], [address]   [phone]    For therapy referral. Isabelle Holt asked SW to leave number for them to call back regarding an appointment. SW will follow up.     LUIS Harkins

## 2023-07-03 NOTE — BH NOTE
Patient seen on 07/02 by Nasim Stewart. Chief Complaint:  Suicidal ideation     Length of Stay: 15 Days    Interval History:  Ms. Olivia Lang is doing poorly. Says she barely slept last night and woke up feeling \"horrible\". Describes her mood as hopeless and has passive thoughts of dying. She remains isolative to her room and no energy or motivation. Calm and cooperative during the interview. Remains motivated to get started on ECT. At the present time the patient Laura Wade remains compliant with taking medications. Denies any adverse events from taking them and feels they have been beneficial.     6/19 - Laura Wade reports feeling good post ECT having had a headache treated with tylenol. No memory loss. Moods are good currently. Appropriately dressed and groomed. Denies SI/HI/AH/VH. No aggression or violence. Appropriately interactive and aware. Tolerating medications well. Eating and sleeping fairly. Wants to stay off of the Gabapentin. 6/20 - Laura Wade reports feeling good post ECT yesterday, but became restless as the day progressed. Moods are good. Appropriately dressed and groomed. Denies SI/HI/AH/VH. No aggression or violence. Appropriately interactive and aware. Tolerating medications well. Eating and sleeping poorly. 6/21 - Laura Wade reports feeling tired with a migraine post ECT # 2. Motrin helping a little but feels the headache is worse. No memory loss. Moods are depressed 7/10 post procedure. Appropriately dressed and groomed. Denies SI/HI/AH/VH. No aggression or violence. Appropriately interactive and aware. Tolerating medications well. Eating and sleeping fairly. Insight is fair and judgement is fair. 6/22 - Laura Wade reports feeling less spry than after the first ECT however notes improvements overall. Moods are fair. Appropriately dressed and groomed.   Concerned about the headache she is getting and

## 2023-07-03 NOTE — PLAN OF CARE
Problem: Coping  Goal: Pt/Family able to verbalize concerns and demonstrate effective coping strategies  Description: INTERVENTIONS:  1. Assist patient/family to identify coping skills, available support systems and cultural and spiritual values  2. Provide emotional support, including active listening and acknowledgement of concerns of patient and caregivers  3. Reduce environmental stimuli, as able  4. Instruct patient/family in relaxation techniques, as appropriate  5. Assess for spiritual pain/suffering and initiate Spiritual Care, Psychosocial Clinical Specialist consults as needed  7/3/2023 1400 by Amaury Cam RN  Outcome: Completed    Assumed care of patient. Met patient in dayroom. Patient smiling. Pleasant and friendly. Denied anxiety, depression, SI, HI and AVH. No complaints of pain. Medication and meal compliant. Patient visible on unit. In dayroom for groups. Interacting appropriately with staff and peers. Patient pleasant and smiling. No issues noted throughout shift.

## 2023-07-04 PROCEDURE — 6370000000 HC RX 637 (ALT 250 FOR IP): Performed by: PSYCHIATRY & NEUROLOGY

## 2023-07-04 PROCEDURE — 1240000000 HC EMOTIONAL WELLNESS R&B

## 2023-07-04 PROCEDURE — 6370000000 HC RX 637 (ALT 250 FOR IP): Performed by: NURSE PRACTITIONER

## 2023-07-04 RX ORDER — HYDROXYZINE HYDROCHLORIDE 25 MG/1
25 TABLET, FILM COATED ORAL 3 TIMES DAILY PRN
Status: DISCONTINUED | OUTPATIENT
Start: 2023-07-04 | End: 2023-07-14 | Stop reason: HOSPADM

## 2023-07-04 RX ADMIN — POLYETHYLENE GLYCOL 3350 17 G: 17 POWDER, FOR SOLUTION ORAL at 18:08

## 2023-07-04 RX ADMIN — LISINOPRIL 30 MG: 5 TABLET ORAL at 09:49

## 2023-07-04 RX ADMIN — Medication 9 MG: at 20:59

## 2023-07-04 RX ADMIN — FOLIC ACID 3 MG: 1 TABLET ORAL at 09:48

## 2023-07-04 RX ADMIN — LURASIDONE HYDROCHLORIDE 40 MG: 40 TABLET, FILM COATED ORAL at 18:06

## 2023-07-04 RX ADMIN — MIRTAZAPINE 30 MG: 15 TABLET, FILM COATED ORAL at 20:56

## 2023-07-04 ASSESSMENT — PAIN SCALES - GENERAL: PAINLEVEL_OUTOF10: 0

## 2023-07-04 NOTE — PROGRESS NOTES
Behavioral Services                                              Medicare Re-Certification    I certify that the inpatient psychiatric hospital services furnished since the previous certification/re-certification were, and continue to be, medically necessary for;    [x] (1) Treatment which could reasonably be expected to improve the patient's condition,    [x] (2) Or for diagnostic study. Estimated length of stay/service 2 weeks    Plan for post-hospital care per social work    This patient continues to need, on a daily basis, active treatment furnished directly by or requiring the supervision of inpatient psychiatric personnel.     Electronically signed by Michael Patel MD on 7/4/2023 at 11:45 AM

## 2023-07-04 NOTE — BH NOTE
Behavioral Health Treatment Team Note     Patient goal(s) for today: to work with family on plans  Treatment team focus/goals: Coordinate with pt family    Progress note: Pt presents ao x 4. Pt denies SI/HI/AVH, anxiety and depression. Pt reports her appetite and sleep are okay as long as she takes her medications. Pt calm and cooperative. SW received message from pt family. Pt brother, Christopher Hernandez, is concerned about patient doing ECT outpatient. Christopher Hernandez left SW a message asking if pt will be able to eat and stay at her home alone if she receives ECT outpatient. Treatment team needs to discuss this with pt family as pt family seems to not have insight into ECT and how this treatment works. SW will coordinate meeting with Dr Ivania Pinto and pt family for this week.     LOS:  17  Expected LOS: 20-30    Insurance info/prescription coverage:  MEDICARE PART A  Date of last family contact:  7/4/2023  Family requesting physician contact today:  No  Discharge plan:  Return home after ECT  Guns in the home:  No  Outpatient provider(s):  Pending     Participating treatment team members: LUIS Leblanc

## 2023-07-04 NOTE — PLAN OF CARE
Problem: Anxiety  Goal: Will report anxiety at manageable levels  Description: INTERVENTIONS:  1. Administer medication as ordered  2. Teach and rehearse alternative coping skills  3.  Provide emotional support with 1:1 interaction with staff  7/4/2023 0821 by Otto Barba RN  Outcome: Progressing

## 2023-07-04 NOTE — BH NOTE
Patient alert, calm, cooperative, pleasant. Denies SI/HI. Denies anxiety and depression. Denies AVH. Denies pain. Medication and meal compliant. No distress observed. No concerns expressed at this time. Visible in the community. Q15 minutes and hourly checks ongoing.

## 2023-07-04 NOTE — PLAN OF CARE
[id number]- The patient is in her room reading. She denies SI/HI/AVH, and denies anxiety and depression. She stated that her appetite is okay and sleep as long as she takes her medications. She is calm and cooperative and seems to be in a good mood. She is medication compliant and ate her snacks in the dayroom. She slept 8.5 hrs. Problem: Anxiety  Goal: Will report anxiety at manageable levels  Description: INTERVENTIONS:  1. Administer medication as ordered  2. Teach and rehearse alternative coping skills  3. Provide emotional support with 1:1 interaction with staff  Outcome: Progressing     Problem: Behavior  Goal: Pt/Family maintain appropriate behavior and adhere to behavioral management agreement, if implemented  Description: INTERVENTIONS:  1. Assess patient/family's coping skills and  non-compliant behavior (including use of illegal substances)  2. Notify security of behavior or suspected illegal substances which indicate the need for search of the family and/or belongings  3. Encourage verbalization of thoughts and concerns in a socially appropriate manner  4. Utilize positive, consistent limit setting strategies supporting safety of patient, staff and others  5. Encourage participation in the decision making process about the behavioral management agreement  6. If a visitor's behavior poses a threat to safety call refer to organization policy. 7. Initiate consult with , Psychosocial CNS, Spiritual Care as appropriate  Outcome: Progressing     Problem: Depression/Self Harm  Goal: Effect of psychiatric condition will be minimized and patient will be protected from self harm  Description: INTERVENTIONS:  1. Assess impact of patient's symptoms on level of functioning, self care needs and offer support as indicated  2. Assess patient/family knowledge of depression, impact on illness and need for teaching  3. Provide emotional support, presence and reassurance  4.  Assess for possible suicidal

## 2023-07-05 ENCOUNTER — ANESTHESIA EVENT (OUTPATIENT)
Facility: HOSPITAL | Age: 68
End: 2023-07-05
Payer: MEDICARE

## 2023-07-05 ENCOUNTER — ANESTHESIA (OUTPATIENT)
Facility: HOSPITAL | Age: 68
End: 2023-07-05
Payer: MEDICARE

## 2023-07-05 PROCEDURE — 6360000002 HC RX W HCPCS: Performed by: PSYCHIATRY & NEUROLOGY

## 2023-07-05 PROCEDURE — 1240000000 HC EMOTIONAL WELLNESS R&B

## 2023-07-05 PROCEDURE — 2500000003 HC RX 250 WO HCPCS: Performed by: ANESTHESIOLOGY

## 2023-07-05 PROCEDURE — 6370000000 HC RX 637 (ALT 250 FOR IP): Performed by: PSYCHIATRY & NEUROLOGY

## 2023-07-05 PROCEDURE — 3700000000 HC ANESTHESIA ATTENDED CARE: Performed by: PSYCHIATRY & NEUROLOGY

## 2023-07-05 PROCEDURE — 7100000001 HC PACU RECOVERY - ADDTL 15 MIN: Performed by: PSYCHIATRY & NEUROLOGY

## 2023-07-05 PROCEDURE — 2709999900 HC NON-CHARGEABLE SUPPLY: Performed by: PSYCHIATRY & NEUROLOGY

## 2023-07-05 PROCEDURE — 6360000002 HC RX W HCPCS: Performed by: ANESTHESIOLOGY

## 2023-07-05 PROCEDURE — 7100000000 HC PACU RECOVERY - FIRST 15 MIN: Performed by: PSYCHIATRY & NEUROLOGY

## 2023-07-05 PROCEDURE — 90870 ELECTROCONVULSIVE THERAPY: CPT | Performed by: PSYCHIATRY & NEUROLOGY

## 2023-07-05 RX ORDER — SODIUM CHLORIDE 9 MG/ML
INJECTION, SOLUTION INTRAVENOUS CONTINUOUS
Status: DISCONTINUED | OUTPATIENT
Start: 2023-07-05 | End: 2023-07-05 | Stop reason: HOSPADM

## 2023-07-05 RX ORDER — SODIUM CHLORIDE 0.9 % (FLUSH) 0.9 %
5-40 SYRINGE (ML) INJECTION PRN
Status: DISCONTINUED | OUTPATIENT
Start: 2023-07-05 | End: 2023-07-05 | Stop reason: HOSPADM

## 2023-07-05 RX ORDER — KETOROLAC TROMETHAMINE 30 MG/ML
INJECTION, SOLUTION INTRAMUSCULAR; INTRAVENOUS PRN
Status: DISCONTINUED | OUTPATIENT
Start: 2023-07-05 | End: 2023-07-05 | Stop reason: SDUPTHER

## 2023-07-05 RX ORDER — SODIUM CHLORIDE 9 MG/ML
INJECTION, SOLUTION INTRAVENOUS PRN
Status: DISCONTINUED | OUTPATIENT
Start: 2023-07-05 | End: 2023-07-05 | Stop reason: HOSPADM

## 2023-07-05 RX ORDER — SODIUM CHLORIDE 0.9 % (FLUSH) 0.9 %
5-40 SYRINGE (ML) INJECTION EVERY 12 HOURS SCHEDULED
Status: DISCONTINUED | OUTPATIENT
Start: 2023-07-05 | End: 2023-07-05 | Stop reason: HOSPADM

## 2023-07-05 RX ORDER — SUCCINYLCHOLINE/SOD CL,ISO/PF 100 MG/5ML
SYRINGE (ML) INTRAVENOUS PRN
Status: DISCONTINUED | OUTPATIENT
Start: 2023-07-05 | End: 2023-07-05 | Stop reason: SDUPTHER

## 2023-07-05 RX ORDER — SENNA AND DOCUSATE SODIUM 50; 8.6 MG/1; MG/1
2 TABLET, FILM COATED ORAL DAILY PRN
Status: DISCONTINUED | OUTPATIENT
Start: 2023-07-05 | End: 2023-07-14 | Stop reason: HOSPADM

## 2023-07-05 RX ADMIN — Medication 40 MG: at 07:51

## 2023-07-05 RX ADMIN — LISINOPRIL 30 MG: 5 TABLET ORAL at 08:59

## 2023-07-05 RX ADMIN — METHOTREXATE 15 MG: 2.5 TABLET ORAL at 09:01

## 2023-07-05 RX ADMIN — Medication 9 MG: at 21:05

## 2023-07-05 RX ADMIN — KETOROLAC TROMETHAMINE 30 MG: 30 INJECTION INTRAMUSCULAR; INTRAVENOUS at 07:49

## 2023-07-05 RX ADMIN — FOLIC ACID 3 MG: 1 TABLET ORAL at 08:59

## 2023-07-05 RX ADMIN — SENNOSIDES AND DOCUSATE SODIUM 2 TABLET: 50; 8.6 TABLET ORAL at 11:48

## 2023-07-05 RX ADMIN — Medication 30 MG: at 07:51

## 2023-07-05 RX ADMIN — LURASIDONE HYDROCHLORIDE 40 MG: 40 TABLET, FILM COATED ORAL at 16:24

## 2023-07-05 RX ADMIN — MIRTAZAPINE 30 MG: 15 TABLET, FILM COATED ORAL at 21:05

## 2023-07-05 ASSESSMENT — PAIN SCALES - GENERAL
PAINLEVEL_OUTOF10: 0

## 2023-07-05 NOTE — CARE COORDINATION
07/05/23 1526   ITP   Date of Next Review 07/12/23   Short Term Goal 1   STG Goal 1 Status: Patient Appears to be  Progressing toward treatment plan goal   Short Term Goal 2   STG Goal 2 Status: Patient Appears to be  Progressing toward treatment plan goal

## 2023-07-05 NOTE — GROUP NOTE
Group Therapy Note    Date: 7/5/2023    Group Start Time: 0900  Group End Time: 1000  Group Topic: Topic Group    [place] [address] AC[facility]        Group Therapy Note    Attendees:        Patient's Goal:  To participate in chair exercise routine    Notes:  Pt did not attend session    Discipline Responsible: Recreational Therapist      Signature:  Cami Corona

## 2023-07-05 NOTE — BH NOTE
Pt is calm and cooperative. Alert. Pt is visible on the unit. Pt is accepting meals and medications. Pt is socializing with peers. Pt denies si/hi/a/v gallardo. Anxiety and depression. Pt requested and was given senokot for constipation. Pt received ECT this morning without issue. Will continue to monitor pt. Pt will  sample at

## 2023-07-05 NOTE — PROCEDURES
ECT PROCEDURE NOTE      IDENTIFICATION:    Patient Name  Renetta Felix   Date of Birth 1955   [facility] [id number]   Medical Record Number  [id number]      Age  78 y.o. PCP Malia Adams MD   Admit date:  6/17/2023    Room Number  OR/PL  @ [facility]   Date of Service  7/5/2023            Electro Convulsive Therapy:  Treatment number 4        Maintenance ECT - NO   Laura Wade was admitted to the PACU for ECT. Patient was medically cleared and NPO for procedure. Patient is not court mandated and gave informed consent for ECT treatment. Patient received     Bilateral ECT - YES    Administered using the [address] [place] [address]. at 40 % Energy, under general anesthesia. Laura Wade with a good, well generalized seizure of 53 seconds on observation of the motor manifestations of the seizure. EEG duration was NA secs. Post-Ictal Suppression Index: NA %  Recovery was unremarkable and with no complications. Change in Energy %:            Anesthesia medications administered during procedure:  Brevital:  40 mg  Change for next treament:     Succinylcholine: 30 mg  Change for next treatment:      Propofol: mg  Glycopyrrolate:  mg  Labetalol:  mg  Esmolol:  mg  Lorazepam:  mg  Ketamine:  mg  Zofran:   mg    Toradol: 30 mg  Lidocaine:  mg  Caffeine Benzoate: mg       No flowsheet data found.     SIGNED:    Ericka Yañez MD  7/5/2023

## 2023-07-05 NOTE — PERIOP NOTE
Mary Vega  1955  [id number]    Situation:  Verbal report given from: Kellie Godinez  Procedure: Procedure(s):  ELECTROCONVULSIVE THERAPY    Background:    Preoperative diagnosis: Major depressive disorder, recurrent episode, moderate (HCC) [F33.1]    Postoperative diagnosis: * No post-op diagnosis entered *    :  Dr. London Garza    Assistant(s): Circulator: Shahnaz Richey RN  Scrub Person First: Dipika Lake    Specimens: * No specimens in log *    Assessment:  Intra-procedure medications         Anesthesia gave intra-procedure sedation and medications, see anesthesia flow sheet     Intravenous fluids: LR@ KVO     Vital signs stable yes      Recommendation:    Permission to share finding with

## 2023-07-05 NOTE — ANESTHESIA PRE PROCEDURE
Induction: intravenous. Anesthetic plan and risks discussed with patient.                         Rodolfo Young MD   7/5/2023

## 2023-07-05 NOTE — GROUP NOTE
Group Therapy Note    Date: 7/5/2023    Group Start Time: 0815  Group End Time: 7515  Group Topic: Community Meeting    [address]        Group Therapy Note    Attendees: 5       Patient's Goal:  Did not attend   Signature:  Jose L Camargo

## 2023-07-05 NOTE — BH NOTE
Behavioral Health Treatment Team Note     Patient goal(s) for today: to read my books  Treatment team focus/goals: Continue ECT    Progress note: Pt presents ao x 4. Pt presents calm and cooperative with treatment team. Pt engages in thoughtful conversations with treatment team. Pt has been very visible on the unit, sharing her books and puzzles with peers and attending groups. Pt appears to be keeping herself busy with various activities and socializing, showing motivation in her treatment and her admission. Pt received her 4th ECT treatment today. Pt family had reached out to  with concerns regarding ECT being completed on an outpatient basis. Pt told SW this morning that she would like to remain inpatient during the rest of her ECT treatments, so that any complications can be monitored and feeling supported through this process. Pt also reports feeling safer in the hospital than at home at this time. Further disposition planning is pending ECT treatments.     LOS:  18  Expected LOS: 27    Insurance info/prescription coverage:  MEDICARE PART A  Date of last family contact:  7/5/2023  Family requesting physician contact today:  No  Discharge plan:  Return home after ECT  Guns in the home:  No  Outpatient provider(s):  Pending     Participating treatment team members: LUIS Botello

## 2023-07-05 NOTE — BH NOTE
Chief Complaint:  Suicidal ideation     Length of Stay: 18 Days    Interval History:  Ms. Alexi Wiggins is doing poorly. Says she barely slept last night and woke up feeling \"horrible\". Describes her mood as hopeless and has passive thoughts of dying. She remains isolative to her room and no energy or motivation. Calm and cooperative during the interview. Remains motivated to get started on ECT. At the present time the patient Laura Wade remains compliant with taking medications. Denies any adverse events from taking them and feels they have been beneficial.     6/19 - Laura Wade reports feeling good post ECT having had a headache treated with tylenol. No memory loss. Moods are good currently. Appropriately dressed and groomed. Denies SI/HI/AH/VH. No aggression or violence. Appropriately interactive and aware. Tolerating medications well. Eating and sleeping fairly. Wants to stay off of the Gabapentin. 6/20 - Laura Wade reports feeling good post ECT yesterday, but became restless as the day progressed. Moods are good. Appropriately dressed and groomed. Denies SI/HI/AH/VH. No aggression or violence. Appropriately interactive and aware. Tolerating medications well. Eating and sleeping poorly. 6/21 - Laura Wade reports feeling tired with a migraine post ECT # 2. Motrin helping a little but feels the headache is worse. No memory loss. Moods are depressed 7/10 post procedure. Appropriately dressed and groomed. Denies SI/HI/AH/VH. No aggression or violence. Appropriately interactive and aware. Tolerating medications well. Eating and sleeping fairly. Insight is fair and judgement is fair. 6/22 - Laura Wade reports feeling less spry than after the first ECT however notes improvements overall. Moods are fair. Appropriately dressed and groomed. Concerned about the headache she is getting and if it will be pretreated. Denies SI/HI/AH/VH.   No unit after her ENT appointment and cleared to resume ECT. She is medication compliant, slept 8 hours overnight and is able to make her needs known. She states she is feeling better and is eager to resume ECT next week. Discussed next steps including the OR schedule for the holiday weekend. Patent got no PRNs for agitation or aggression. 07/02 - the patient is visible; she slept 8.5 hours overnight and is medication compliant. Patient states she feels good today and is refreshed. She denies active thoughts of self harm and is able to make her needs known. Patient inquires about transitioning to outpatient ECT after the first 5 sessions complete this week. Discussed the diagnosis (bipolar depression) and patient encouraged to discuss disposition with the primary team.    07/03 - Laura Wade reports doing better each day with the treatments that she has done. ECT to begin again on Wednesday. Hoping to do a few sessions then finish in the outpatient setting. Appropriately dressed and groomed. Denies SI/HI/AH/VH. No aggression or violence. Appropriately interactive and aware. Tolerating medications well. Eating and sleeping fairly. 7/4 - Laura Wade reports feeling better and is contemplating leaving on Friday. Family is concerned. She and they would like a family meeting to discuss her treatments going forward. Moods are improving. Appropriately dressed and groomed. Denies SI/HI/AH/VH. No aggression or violence. Appropriately interactive and aware. Tolerating medications well. Eating and sleeping fairly (8 hrs). 7/5 - Laura Wade reports feeling ok post ECT # 4 today. No headache or memory loss. Moods are saddened due to having to stay in the hospital for the complete treatments. Less anxiety overall. Appropriately dressed and groomed. Denies SI/HI/AH/VH. No aggression or violence. Appropriately interactive and aware. Tolerating medications well.   Eating and sleeping

## 2023-07-05 NOTE — ANESTHESIA POSTPROCEDURE EVALUATION
Department of Anesthesiology  Postprocedure Note    Patient: Nicki Hood  MRN: [id number]  YOB: 1955  Date of evaluation: 7/5/2023      Procedure Summary     Date: 07/05/23 Room / Location: [facility] OR R2 / [facility] OR    Anesthesia Start: 9739 Anesthesia Stop: 0801    Procedure: ELECTROCONVULSIVE THERAPY (Head) Diagnosis:       Major depressive disorder, recurrent episode, moderate (HCC)      (Major depressive disorder, recurrent episode, moderate ([address]) [F33.1])    Surgeons: Calista Baca MD Responsible Provider: Torres Pop MD    Anesthesia Type: general ASA Status: 2          Anesthesia Type: No value filed.     Virgen Phase I: Virgen Score: 10    Virgen Phase II: Virgen Score: 10      Anesthesia Post Evaluation    Patient location during evaluation: PACU  Patient participation: complete - patient participated  Level of consciousness: awake and alert  Airway patency: patent  Nausea & Vomiting: no vomiting and no nausea  Complications: no  Cardiovascular status: hemodynamically stable  Respiratory status: acceptable  Hydration status: stable

## 2023-07-05 NOTE — PERIOP NOTE
Spoke with Lawrence Brown, to advise patient scheduled for ECT procedure 7/7/2023, NPO after midnight, arrive PACU 0730.

## 2023-07-05 NOTE — PROGRESS NOTES
Patient denied SI, HI, AVH and pain. She was mrd compliant and had a snack. She received melatonin for insomnia and appears ro be sleeping well.  She has been NPO past MN for ECT in the AM.

## 2023-07-05 NOTE — PLAN OF CARE
Problem: Anxiety  Goal: Will report anxiety at manageable levels  Description: INTERVENTIONS:  1. Administer medication as ordered  2. Teach and rehearse alternative coping skills  3. Provide emotional support with 1:1 interaction with staff  Outcome: Progressing     Problem: Decision Making  Goal: Pt/Family able to effectively weigh alternatives and participate in decision making related to treatment and care  Description: INTERVENTIONS:  1. Determine when there are differences between patient's view, family's view, and healthcare provider's view of condition  2. Facilitate patient and family articulation of goals for care  3. Help patient and family identify pros/cons of alternative solutions  4. Provide information as requested by patient/family  5. Respect patient/family right to receive or not to receive information  6. Serve as a liaison between patient and family and health care team  7. Initiate Consults from Ethics, Palliative Care or initiate [employer] as is appropriate  Outcome: Progressing     Problem: Behavior  Goal: Pt/Family maintain appropriate behavior and adhere to behavioral management agreement, if implemented  Description: INTERVENTIONS:  1. Assess patient/family's coping skills and  non-compliant behavior (including use of illegal substances)  2. Notify security of behavior or suspected illegal substances which indicate the need for search of the family and/or belongings  3. Encourage verbalization of thoughts and concerns in a socially appropriate manner  4. Utilize positive, consistent limit setting strategies supporting safety of patient, staff and others  5. Encourage participation in the decision making process about the behavioral management agreement  6. If a visitor's behavior poses a threat to safety call refer to organization policy.   7. Initiate consult with , Psychosocial CNS, Spiritual Care as appropriate  Outcome: Progressing

## 2023-07-05 NOTE — PERIOP NOTE
TRANSFER - OUT REPORT:    Verbal report given to Radha on Laura Wade  being transferred to [facility] for routine post-op       Report consisted of patient's Situation, Background, Assessment and   Recommendations(SBAR). Information from the following report(s) Nurse Handoff Report was reviewed with the receiving nurse. Lines:       Opportunity for questions and clarification was provided.       Patient transported with:  [employer]

## 2023-07-05 NOTE — GROUP NOTE
Group Therapy Note    Date: 7/5/2023    Group Start Time: 1500  Group End Time: 1600  Group Topic: Recreational    [address] [facility]    [address]        Group Therapy Note    Attendees:        Patient's Goal:  To participate in relaxation activity    Notes:  Pt did not attend session    Discipline Responsible: Recreational Therapist      Signature:  Dionisio Goldberg

## 2023-07-05 NOTE — PROGRESS NOTES
Patient has been NPO since MN. She is alert. oriented and states she is ready for ECT. Report to Alisa Lockhart in PACU.

## 2023-07-06 PROCEDURE — 1240000000 HC EMOTIONAL WELLNESS R&B

## 2023-07-06 PROCEDURE — 6370000000 HC RX 637 (ALT 250 FOR IP): Performed by: PSYCHIATRY & NEUROLOGY

## 2023-07-06 RX ADMIN — LISINOPRIL 30 MG: 5 TABLET ORAL at 08:33

## 2023-07-06 RX ADMIN — LEUCOVORIN CALCIUM 5 MG: 5 TABLET ORAL at 08:35

## 2023-07-06 RX ADMIN — Medication 9 MG: at 21:12

## 2023-07-06 RX ADMIN — MIRTAZAPINE 30 MG: 15 TABLET, FILM COATED ORAL at 21:12

## 2023-07-06 RX ADMIN — LURASIDONE HYDROCHLORIDE 40 MG: 40 TABLET, FILM COATED ORAL at 17:29

## 2023-07-06 RX ADMIN — FOLIC ACID 3 MG: 1 TABLET ORAL at 08:34

## 2023-07-06 RX ADMIN — SENNOSIDES AND DOCUSATE SODIUM 2 TABLET: 50; 8.6 TABLET ORAL at 08:33

## 2023-07-06 ASSESSMENT — PAIN SCALES - GENERAL: PAINLEVEL_OUTOF10: 0

## 2023-07-06 NOTE — BH NOTE
Behavioral Health Treatment Team Note     Patient goal(s) for today: to keep myself busy  Treatment team focus/goals: continue ECT, potentially provide psychoeducation to pt family    Progress note: Pt presents ao x 4. Pt endorses sadness and crying spells today which pt reports is not typical of herself. Pt calm and cooperative with treatment team. Pt pleasant with staff and peers. Pt asked treatment team a few questions today related to concerns held by her family. Pt asked Dr Elke Velasquez if she had Bipolar disorder, Dr Elke Velasquez explained to pt that she would not develop Bipolar disorder at her age as she does not have a history of it prior to this year. Pt reports, which SW corroborated, that pt family continues to say that pt has Bipolar disorder and labels some of her behaviors as manic. Treatment team explained to pt what alberto typically looks like, pt verbalized understanding and stated ''Yeah I do not have that\". SW explained to pt that her family may not know what Bipolar disorder truly looks like, leaving them to label some behaviors as manic that are not. LUCIE and  will have a meeting with pt family if this continues as it has created some disconnect between pt and pt family.      LOS:  19  Expected LOS: 27    Insurance info/prescription coverage:  MEDICARE PART A  Date of last family contact:  7/5/2023  Family requesting physician contact today:  No  Discharge plan:  Return home after ECT  Guns in the home:  No  Outpatient provider(s):  Pending     Participating treatment team members: LUIS Shaw

## 2023-07-06 NOTE — GROUP NOTE
Group Therapy Note    Date: 7/6/2023    Group Start Time: 1500  Group End Time: 1600  Group Topic: Recreational    [address] [facility]    [address]        Group Therapy Note    Attendees:        Patient's Goal:  To concentrate on selected task    Notes:  Pt did not attend session    Discipline Responsible: Recreational Therapist      Signature:  Consuelo Mascorro

## 2023-07-06 NOTE — BH NOTE
Date: 7/6/2023  Start Time: 2:00  End Time:  3:00  Number of Participants: 6    Type of Group: Psychoeducation    Notes:  SW conducted educational group on mental illness. SW was able to introduce and define mental illness as well as serious mental illness. SW then proceeded to discuss different [facility] diagnoses as well as the difference between a personality disorder and thought disorder. SW then dicussed interventions for each diagnosis. Status After Intervention:  Improved    Participation Level:  Active Listener    Participation Quality: Appropriate      Speech:  normal      Thought Process/Content: Logical      Affective Functioning: Congruent        Level of consciousness:  Alert      Response to Learning: Able to verbalize current knowledge/experience      Endings: None Reported    Modes of Intervention: Education      Discipline Responsible: /Counselor      Signature:  Sheeba Jimenez, supervisee in social work

## 2023-07-06 NOTE — GROUP NOTE
Group Therapy Note    Date: 7/6/2023    Group Start Time: 0900  Group End Time: 1000  Group Topic: Topic Group    [place] [address] ACUTE BE[address]AV [address] [facility]        Group Therapy Note    Attendees:        Patient's Goal:  To participate in mental health Prifloat game    Notes:  Pt did not attend session    Discipline Responsible: Recreational Therapist      Signature:  Eloy Garcia

## 2023-07-06 NOTE — BH NOTE
Pt is calm and cooperative. Alert. Pt is visible on the unit. Pt is accepting meals and medications. Pt is socializing with peers. Pt denies si/hi/a/v gallardo. Anxiety and depression. Pt requested and was given senokot for constipation. Will continue to monitor pt.

## 2023-07-06 NOTE — PLAN OF CARE
Problem: Anxiety  Goal: Will report anxiety at manageable levels  Description: INTERVENTIONS:  1. Administer medication as ordered  2. Teach and rehearse alternative coping skills  3. Provide emotional support with 1:1 interaction with staff  Outcome: Progressing     Problem: Decision Making  Goal: Pt/Family able to effectively weigh alternatives and participate in decision making related to treatment and care  Description: INTERVENTIONS:  1. Determine when there are differences between patient's view, family's view, and healthcare provider's view of condition  2. Facilitate patient and family articulation of goals for care  3. Help patient and family identify pros/cons of alternative solutions  4. Provide information as requested by patient/family  5. Respect patient/family right to receive or not to receive information  6. Serve as a liaison between patient and family and health care team  7. Initiate Consults from Ethics, Palliative Care or initiate [facility] as is appropriate  Outcome: Progressing     Problem: Behavior  Goal: Pt/Family maintain appropriate behavior and adhere to behavioral management agreement, if implemented  Description: INTERVENTIONS:  1. Assess patient/family's coping skills and  non-compliant behavior (including use of illegal substances)  2. Notify security of behavior or suspected illegal substances which indicate the need for search of the family and/or belongings  3. Encourage verbalization of thoughts and concerns in a socially appropriate manner  4. Utilize positive, consistent limit setting strategies supporting safety of patient, staff and others  5. Encourage participation in the decision making process about the behavioral management agreement  6. If a visitor's behavior poses a threat to safety call refer to organization policy.   7. Initiate consult with , Psychosocial CNS, Spiritual Care as appropriate  Outcome: Progressing     Problem:

## 2023-07-07 ENCOUNTER — ANESTHESIA EVENT (OUTPATIENT)
Facility: HOSPITAL | Age: 68
DRG: 885 | End: 2023-07-07
Payer: MEDICARE

## 2023-07-07 ENCOUNTER — ANESTHESIA (OUTPATIENT)
Facility: HOSPITAL | Age: 68
End: 2023-07-07
Payer: MEDICARE

## 2023-07-07 PROCEDURE — 6370000000 HC RX 637 (ALT 250 FOR IP): Performed by: PSYCHIATRY & NEUROLOGY

## 2023-07-07 PROCEDURE — 2709999900 HC NON-CHARGEABLE SUPPLY: Performed by: PSYCHIATRY & NEUROLOGY

## 2023-07-07 PROCEDURE — 90870 ELECTROCONVULSIVE THERAPY: CPT | Performed by: PSYCHIATRY & NEUROLOGY

## 2023-07-07 PROCEDURE — 2500000003 HC RX 250 WO HCPCS: Performed by: ANESTHESIOLOGY

## 2023-07-07 PROCEDURE — 7100000001 HC PACU RECOVERY - ADDTL 15 MIN: Performed by: PSYCHIATRY & NEUROLOGY

## 2023-07-07 PROCEDURE — 3700000000 HC ANESTHESIA ATTENDED CARE: Performed by: PSYCHIATRY & NEUROLOGY

## 2023-07-07 PROCEDURE — 1240000000 HC EMOTIONAL WELLNESS R&B

## 2023-07-07 PROCEDURE — 7100000000 HC PACU RECOVERY - FIRST 15 MIN: Performed by: PSYCHIATRY & NEUROLOGY

## 2023-07-07 PROCEDURE — 6360000002 HC RX W HCPCS: Performed by: ANESTHESIOLOGY

## 2023-07-07 RX ORDER — SODIUM CHLORIDE 9 MG/ML
INJECTION, SOLUTION INTRAVENOUS PRN
Status: DISCONTINUED | OUTPATIENT
Start: 2023-07-07 | End: 2023-07-07 | Stop reason: HOSPADM

## 2023-07-07 RX ORDER — PREDNISONE 20 MG/1
60 TABLET ORAL DAILY
Status: DISCONTINUED | OUTPATIENT
Start: 2023-07-07 | End: 2023-07-07

## 2023-07-07 RX ORDER — SODIUM CHLORIDE 0.9 % (FLUSH) 0.9 %
5-40 SYRINGE (ML) INJECTION EVERY 12 HOURS SCHEDULED
Status: DISCONTINUED | OUTPATIENT
Start: 2023-07-07 | End: 2023-07-07 | Stop reason: HOSPADM

## 2023-07-07 RX ORDER — SODIUM CHLORIDE 0.9 % (FLUSH) 0.9 %
5-40 SYRINGE (ML) INJECTION PRN
Status: DISCONTINUED | OUTPATIENT
Start: 2023-07-07 | End: 2023-07-07 | Stop reason: HOSPADM

## 2023-07-07 RX ORDER — KETOROLAC TROMETHAMINE 30 MG/ML
INJECTION, SOLUTION INTRAMUSCULAR; INTRAVENOUS PRN
Status: DISCONTINUED | OUTPATIENT
Start: 2023-07-07 | End: 2023-07-07 | Stop reason: SDUPTHER

## 2023-07-07 RX ORDER — PREDNISONE 20 MG/1
60 TABLET ORAL DAILY PRN
Status: ON HOLD | COMMUNITY
End: 2023-07-13 | Stop reason: HOSPADM

## 2023-07-07 RX ORDER — PREDNISONE 20 MG/1
60 TABLET ORAL DAILY
Status: COMPLETED | OUTPATIENT
Start: 2023-07-07 | End: 2023-07-13

## 2023-07-07 RX ORDER — SUCCINYLCHOLINE/SOD CL,ISO/PF 100 MG/5ML
SYRINGE (ML) INTRAVENOUS PRN
Status: DISCONTINUED | OUTPATIENT
Start: 2023-07-07 | End: 2023-07-07 | Stop reason: SDUPTHER

## 2023-07-07 RX ORDER — SODIUM CHLORIDE 9 MG/ML
INJECTION, SOLUTION INTRAVENOUS CONTINUOUS
Status: DISCONTINUED | OUTPATIENT
Start: 2023-07-07 | End: 2023-07-07

## 2023-07-07 RX ORDER — SODIUM CHLORIDE 9 MG/ML
INJECTION, SOLUTION INTRAVENOUS CONTINUOUS
Status: DISCONTINUED | OUTPATIENT
Start: 2023-07-07 | End: 2023-07-07 | Stop reason: HOSPADM

## 2023-07-07 RX ADMIN — Medication 30 MG: at 09:19

## 2023-07-07 RX ADMIN — LISINOPRIL 30 MG: 5 TABLET ORAL at 10:27

## 2023-07-07 RX ADMIN — PREDNISONE 60 MG: 20 TABLET ORAL at 17:57

## 2023-07-07 RX ADMIN — LURASIDONE HYDROCHLORIDE 40 MG: 40 TABLET, FILM COATED ORAL at 17:57

## 2023-07-07 RX ADMIN — FOLIC ACID 3 MG: 1 TABLET ORAL at 10:27

## 2023-07-07 RX ADMIN — MIRTAZAPINE 30 MG: 15 TABLET, FILM COATED ORAL at 21:27

## 2023-07-07 RX ADMIN — Medication 40 MG: at 09:19

## 2023-07-07 RX ADMIN — LEUCOVORIN CALCIUM 5 MG: 5 TABLET ORAL at 10:29

## 2023-07-07 RX ADMIN — KETOROLAC TROMETHAMINE 30 MG: 30 INJECTION INTRAMUSCULAR; INTRAVENOUS at 09:16

## 2023-07-07 RX ADMIN — Medication 9 MG: at 21:27

## 2023-07-07 ASSESSMENT — PAIN - FUNCTIONAL ASSESSMENT
PAIN_FUNCTIONAL_ASSESSMENT: ADULT NONVERBAL PAIN SCALE (NPVS)
PAIN_FUNCTIONAL_ASSESSMENT: NONE - DENIES PAIN
PAIN_FUNCTIONAL_ASSESSMENT: ADULT NONVERBAL PAIN SCALE (NPVS)
PAIN_FUNCTIONAL_ASSESSMENT: NONE - DENIES PAIN

## 2023-07-07 NOTE — ANESTHESIA PRE PROCEDURE
05:15 AM    RDW 12.4 06/25/2023 05:15 AM     06/25/2023 05:15 AM       CMP:   Lab Results   Component Value Date/Time     06/25/2023 05:15 AM    K 4.0 06/25/2023 05:15 AM     06/25/2023 05:15 AM    CO2 30 06/25/2023 05:15 AM    BUN 25 06/25/2023 05:15 AM    CREATININE 0.85 06/25/2023 05:15 AM    AGRATIO 1.1 02/14/2023 04:51 AM    LABGLOM >60 06/25/2023 05:15 AM    GLUCOSE 87 06/25/2023 05:15 AM    PROT 5.4 06/25/2023 05:15 AM    CALCIUM 8.0 06/25/2023 05:15 AM    BILITOT 0.7 06/25/2023 05:15 AM    ALKPHOS 52 06/25/2023 05:15 AM    ALKPHOS 54 02/14/2023 04:51 AM    AST 13 06/25/2023 05:15 AM    ALT 20 06/25/2023 05:15 AM       POC Tests: No results for input(s): POCGLU, POCNA, POCK, POCCL, POCBUN, POCHEMO, POCHCT in the last 72 hours.     Coags:   Lab Results   Component Value Date/Time    PROTIME 10.7 02/12/2023 08:17 PM    INR 1.0 02/12/2023 08:17 PM       HCG (If Applicable): No results found for: PREGTESTUR, PREGSERUM, HCG, HCGQUANT     ABGs: No results found for: PHART, PO2ART, AKL2COM, OUU0RGP, BEART, F5DSXGWS     Type & Screen (If Applicable):  No results found for: LABABO, LABRH    Drug/Infectious Status (If Applicable):  No results found for: HIV, HEPCAB    COVID-19 Screening (If Applicable):   Lab Results   Component Value Date/Time    COVID19 Not detected 06/10/2023 12:13 PM           Anesthesia Evaluation  Patient summary reviewed and Nursing notes reviewed  Airway: Mallampati: I  TM distance: >3 FB   Neck ROM: full  Mouth opening: > = 3 FB   Dental:    (+) caps      Pulmonary:Negative Pulmonary ROS breath sounds clear to auscultation                             Cardiovascular:  Exercise tolerance: good (>4 METS),   (+) hypertension:,         Rhythm: regular  Rate: normal           Beta Blocker:  Not on Beta Blocker         Neuro/Psych:   (+) psychiatric history:            GI/Hepatic/Renal: Neg GI/Hepatic/Renal ROS            Endo/Other: Negative Endo/Other ROS

## 2023-07-07 NOTE — PROGRESS NOTES
Patient has been NPO since MN for ECT this mornings. She is awake, in patient gown and has no complaints pending proceedurs.

## 2023-07-07 NOTE — PROCEDURES
ECT PROCEDURE NOTE      IDENTIFICATION:    Patient Name  Rupa Earl   Date of Birth 1955   [facility] [id number]   Medical Record Number  [id number]      Age  78 y.o. PCP Anitha Bryan MD   Admit date:  6/17/2023    Room Number  OR/PL  @ [facility]   Date of Service  7/7/2023            Electro Convulsive Therapy:  Treatment number 5        Maintenance ECT - NO   Laura Wade was admitted to the PACU for ECT. Patient was medically cleared and NPO for procedure. Patient is not court mandated and gave informed consent for ECT treatment. Patient received     Bilateral ECT - YES    Administered using the [address] [place] [address]. at 40 % Energy, under general anesthesia. Laura Wade with a good, well generalized seizure of 44 seconds on observation of the motor manifestations of the seizure. EEG duration was 47 secs. Post-Ictal Suppression Index: 29.9 %  Recovery was unremarkable and with no complications. Change in Energy %:            Anesthesia medications administered during procedure:  Brevital:  40 mg  Change for next treament:     Succinylcholine: 30 mg  Change for next treatment:      Propofol: mg  Glycopyrrolate:  mg  Labetalol:  mg  Esmolol:  mg  Lorazepam:  mg  Ketamine:  mg  Zofran:   mg    Toradol: 30 mg  Lidocaine:  mg  Caffeine Benzoate: mg       No flowsheet data found.     SIGNED:    Karen Handy MD  7/7/2023

## 2023-07-07 NOTE — BH NOTE
Behavioral Health Treatment Team Note     Patient goal(s) for today: to make my plan for when I leave  Treatment team focus/goals: continue ECT, formulate plan with pt family    Progress note: Pt presents ao x 4. Pt completed ECT #5 with no complications. Pt set to finish ECT by next Friday. Pt calm and cooperative with treatment team. Pt visible on the milieu and social with peers. Dr. Pardeep Santos and SW are calling pt family today as they have multiple concerns regarding pt and her diagnosis. SW has attempted in the past to explain pt treatment and care here, pt family insists on speaking directly with Dr Pardeep Santos. Treatment team calling pt family today to address their concerns. SW will document conversation.     LOS:  20  Expected LOS: 27    Insurance info/prescription coverage:  MEDICARE PART A  Date of last family contact:  7/5/2023  Family requesting physician contact today:  No  Discharge plan:  Return home after ECT  Guns in the home:  No  Outpatient provider(s): [address] [place] [address] mobile crisis     Participating treatment team members: LUIS Corrales

## 2023-07-07 NOTE — BH NOTE
Pt is alert and oriented x 4. She is calm and cooperative. Pt completed ECT, returned alert with no complaints of pain. She is meal and med compliant denying all issues such as SI/HI, AVH, anxiety and depression.

## 2023-07-07 NOTE — BH NOTE
Group Therapy Note    Date: 7/7/2023  Start Time: 10:45  End Time:  11:45  Number of Participants: 8    Type of Group: Psychotherapy    Notes:   facilitated psychotherapy group on adverse childhood experiences (ACES).      Participation Level: None      Modes of Intervention: Exploration      Discipline Responsible: /Counselor      Signature:  Jason Ken, supervisee in social work

## 2023-07-07 NOTE — PROGRESS NOTES
[facility]: MED RECONCILIATION    Comments/Recommendations:   Pharmacist called patient's ENT office to clarify recent prednisone script - patient was prescribed 60 mg PO daily x 7 days per Dr. Jeremy Hammonds office.          Samantha Dewitt [facility]  Contact: [phone]

## 2023-07-07 NOTE — GROUP NOTE
Group Therapy Note    Date: 7/7/2023    Group Start Time: 0900  Group End Time: 1000  Group Topic: Topic Group    [place] [address] ACUTE BEHAV [address] [facility]        Group Therapy Note    Attendees:        Patient's Goal:  To participate in chair exercise routine    Notes:  Pt did not attend session    Discipline Responsible: Recreational Therapist      Signature:  Suki Marshall

## 2023-07-07 NOTE — ANESTHESIA POSTPROCEDURE EVALUATION
Department of Anesthesiology  Postprocedure Note    Patient: Aga Tillman  MRN: [id number]  YOB: 1955  Date of evaluation: 7/7/2023      Procedure Summary     Date: 07/07/23 [facility] R2 / [facility] OR    Anesthesia Start: 0913 Anesthesia Stop: 2539    Procedure: ELECTROCONVULSIVE THERAPY (Head) Diagnosis:       Major depressive disorder, recurrent episode, moderate (HCC)      (Major depressive disorder, recurrent episode, moderate ([address]) [F33.1])    Surgeons: Erica Robbins MD Responsible Provider: Crystal Padgett MD    Anesthesia Type: general ASA Status: 2          Anesthesia Type: No value filed.     Virgen Phase I: Virgen Score: 10    Virgen Phase II: Virgen Score: 10      Anesthesia Post Evaluation    Patient location during evaluation: PACU  Patient participation: complete - patient participated  Level of consciousness: awake and alert  Airway patency: patent  Nausea & Vomiting: no vomiting and no nausea  Complications: no  Cardiovascular status: hemodynamically stable  Respiratory status: acceptable  Hydration status: stable

## 2023-07-07 NOTE — PROGRESS NOTES
[facility]: MED RECONCILIATION - ADDENDUM    Comments/Recommendations:   ADDENDUM - per Dr. Pastor Sofia who called back - patient is only to take prednisone PRN in a tapering fashion starting at 60 mg if she is experiencing tinnitus. Patient is not to be on scheduled prednisone at this time.      Alethea Leahy [facility]  Contact: [phone]

## 2023-07-07 NOTE — BH NOTE
Chief Complaint:  Suicidal ideation     Length of Stay: 19 Days    Interval History:  Ms. Maria Elena Brown is doing poorly. Says she barely slept last night and woke up feeling \"horrible\". Describes her mood as hopeless and has passive thoughts of dying. She remains isolative to her room and no energy or motivation. Calm and cooperative during the interview. Remains motivated to get started on ECT. At the present time the patient Laura Wade remains compliant with taking medications. Denies any adverse events from taking them and feels they have been beneficial.     6/19 - Laura Wade reports feeling good post ECT having had a headache treated with tylenol. No memory loss. Moods are good currently. Appropriately dressed and groomed. Denies SI/HI/AH/VH. No aggression or violence. Appropriately interactive and aware. Tolerating medications well. Eating and sleeping fairly. Wants to stay off of the Gabapentin. 6/20 - Laura Wade reports feeling good post ECT yesterday, but became restless as the day progressed. Moods are good. Appropriately dressed and groomed. Denies SI/HI/AH/VH. No aggression or violence. Appropriately interactive and aware. Tolerating medications well. Eating and sleeping poorly. 6/21 - Laura Wade reports feeling tired with a migraine post ECT # 2. Motrin helping a little but feels the headache is worse. No memory loss. Moods are depressed 7/10 post procedure. Appropriately dressed and groomed. Denies SI/HI/AH/VH. No aggression or violence. Appropriately interactive and aware. Tolerating medications well. Eating and sleeping fairly. Insight is fair and judgement is fair. 6/22 - Laura Wade reports feeling less spry than after the first ECT however notes improvements overall. Moods are fair. Appropriately dressed and groomed. Concerned about the headache she is getting and if it will be pretreated. Denies SI/HI/AH/VH.   No

## 2023-07-07 NOTE — BH NOTE
Group Therapy Note    Date: 7/7/2023  Start Time: 2:45  End Time:  3:30    Notes:  SW conducted afternoon process group. Process group discussed self-awareness and strengths. Status After Intervention:  Improved    Participation Level:  Active Listener    Participation Quality: Appropriate      Speech:  normal      Thought Process/Content: Linear      Affective Functioning: Congruent      Level of consciousness:  Alert      Response to Learning: Able to verbalize current knowledge/experience      Endings: None Reported    Modes of Intervention: Support      Discipline Responsible: /Counselor      Signature:  Amada Cruz, supervisee in social work

## 2023-07-07 NOTE — PROGRESS NOTES
Patient was visible on the unit, smiling and social. She denied SI, HI, AVH and pain. She was med compliant and had a snack. She received melatonin at bedtime for sleep and appears to be sleeping well.  She is NPO past midnight for ECT in the AM.

## 2023-07-07 NOTE — PERIOP NOTE
TRANSFER - OUT REPORT:    Verbal report given to SHEILA Parker on Aga Tillman  being transferred to [facility] for routine post-op       Report consisted of patient's Situation, Background, Assessment and   Recommendations(SBAR). Information from the following report(s) Nurse Handoff Report was reviewed with the receiving nurse. Lines:   Peripheral IV 07/07/23 Posterior;Right Hand (Active)   Site Assessment Clean, dry & intact 07/07/23 0930   Line Status Infusing 07/07/23 0930   Phlebitis Assessment No symptoms 07/07/23 0930   Infiltration Assessment 0 07/07/23 0930   Alcohol Cap Used No 07/07/23 0930   Dressing Status Clean, dry & intact 07/07/23 0930   Dressing Type Transparent 07/07/23 0930   Dressing Intervention New 07/07/23 0910        Opportunity for questions and clarification was provided.       Patient transported with:  CPUsage

## 2023-07-07 NOTE — PERIOP NOTE
Shaquille [place]  1955  [id number]    Situation:  Verbal report given from: Dr. Trace Soto and Brea Meyers RN  Procedure: Procedure(s):  ELECTROCONVULSIVE THERAPY    Background:    Preoperative diagnosis: Major depressive disorder, recurrent episode, moderate ([address]) [F33.1]    Postoperative diagnosis: * No post-op diagnosis entered *    :  Dr. Bhavik Noyola    Assistant(s): Circulator: Irene Sheridan RN  Scrub Person First: Melvin Cedillo    Specimens: * No specimens in log *    Assessment:  Intra-procedure medications         Anesthesia gave intra-procedure sedation and medications, see anesthesia flow sheet     Intravenous fluids: LR@ KVO     Vital signs stable

## 2023-07-08 PROCEDURE — 6370000000 HC RX 637 (ALT 250 FOR IP): Performed by: PSYCHIATRY & NEUROLOGY

## 2023-07-08 PROCEDURE — 1240000000 HC EMOTIONAL WELLNESS R&B

## 2023-07-08 RX ADMIN — SENNOSIDES AND DOCUSATE SODIUM 2 TABLET: 50; 8.6 TABLET ORAL at 08:39

## 2023-07-08 RX ADMIN — LURASIDONE HYDROCHLORIDE 40 MG: 40 TABLET, FILM COATED ORAL at 17:50

## 2023-07-08 RX ADMIN — FOLIC ACID 3 MG: 1 TABLET ORAL at 08:39

## 2023-07-08 RX ADMIN — LEUCOVORIN CALCIUM 5 MG: 5 TABLET ORAL at 08:39

## 2023-07-08 RX ADMIN — LISINOPRIL 30 MG: 5 TABLET ORAL at 08:39

## 2023-07-08 RX ADMIN — Medication 9 MG: at 20:38

## 2023-07-08 RX ADMIN — PREDNISONE 60 MG: 20 TABLET ORAL at 08:39

## 2023-07-08 RX ADMIN — MIRTAZAPINE 30 MG: 15 TABLET, FILM COATED ORAL at 20:38

## 2023-07-08 NOTE — BH NOTE
Chief Complaint:  Suicidal ideation     Length of Stay: 21 Days    Interval History:  Ms. Cas Barker is doing poorly. Says she barely slept last night and woke up feeling \"horrible\". Describes her mood as hopeless and has passive thoughts of dying. She remains isolative to her room and no energy or motivation. Calm and cooperative during the interview. Remains motivated to get started on ECT. At the present time the patient Laura Wade remains compliant with taking medications. Denies any adverse events from taking them and feels they have been beneficial.     6/19 - Laura Wade reports feeling good post ECT having had a headache treated with tylenol. No memory loss. Moods are good currently. Appropriately dressed and groomed. Denies SI/HI/AH/VH. No aggression or violence. Appropriately interactive and aware. Tolerating medications well. Eating and sleeping fairly. Wants to stay off of the Gabapentin. 6/20 - Laura Wade reports feeling good post ECT yesterday, but became restless as the day progressed. Moods are good. Appropriately dressed and groomed. Denies SI/HI/AH/VH. No aggression or violence. Appropriately interactive and aware. Tolerating medications well. Eating and sleeping poorly. 6/21 - Laura Wade reports feeling tired with a migraine post ECT # 2. Motrin helping a little but feels the headache is worse. No memory loss. Moods are depressed 7/10 post procedure. Appropriately dressed and groomed. Denies SI/HI/AH/VH. No aggression or violence. Appropriately interactive and aware. Tolerating medications well. Eating and sleeping fairly. Insight is fair and judgement is fair. 6/22 - Laura Wade reports feeling less spry than after the first ECT however notes improvements overall. Moods are fair. Appropriately dressed and groomed. Concerned about the headache she is getting and if it will be pretreated. Denies SI/HI/AH/VH.   No unit after her ENT appointment and cleared to resume ECT. She is medication compliant, slept 8 hours overnight and is able to make her needs known. She states she is feeling better and is eager to resume ECT next week. Discussed next steps including the OR schedule for the holiday weekend. Patent got no PRNs for agitation or aggression. 07/02 - the patient is visible; she slept 8.5 hours overnight and is medication compliant. Patient states she feels good today and is refreshed. She denies active thoughts of self harm and is able to make her needs known. Patient inquires about transitioning to outpatient ECT after the first 5 sessions complete this week. Discussed the diagnosis (bipolar depression) and patient encouraged to discuss disposition with the primary team.    07/03 - Laura Wade reports doing better each day with the treatments that she has done. ECT to begin again on Wednesday. Hoping to do a few sessions then finish in the outpatient setting. Appropriately dressed and groomed. Denies SI/HI/AH/VH. No aggression or violence. Appropriately interactive and aware. Tolerating medications well. Eating and sleeping fairly. 7/4 - Laura Wade reports feeling better and is contemplating leaving on Friday. Family is concerned. She and they would like a family meeting to discuss her treatments going forward. Moods are improving. Appropriately dressed and groomed. Denies SI/HI/AH/VH. No aggression or violence. Appropriately interactive and aware. Tolerating medications well. Eating and sleeping fairly (8 hrs). 7/5 - Laura Wade reports feeling ok post ECT # 4 today. No headache or memory loss. Moods are saddened due to having to stay in the hospital for the complete treatments. Less anxiety overall. Appropriately dressed and groomed. Denies SI/HI/AH/VH. No aggression or violence. Appropriately interactive and aware. Tolerating medications well.   Eating and sleeping

## 2023-07-08 NOTE — PLAN OF CARE
Problem: Behavior  Goal: Pt/Family maintain appropriate behavior and adhere to behavioral management agreement, if implemented  Description: INTERVENTIONS:  1. Assess patient/family's coping skills and  non-compliant behavior (including use of illegal substances)  2. Notify security of behavior or suspected illegal substances which indicate the need for search of the family and/or belongings  3. Encourage verbalization of thoughts and concerns in a socially appropriate manner  4. Utilize positive, consistent limit setting strategies supporting safety of patient, staff and others  5. Encourage participation in the decision making process about the behavioral management agreement  6. If a visitor's behavior poses a threat to safety call refer to organization policy. 7. Initiate consult with , Psychosocial CNS, Spiritual Care as appropriate  Outcome: Progressing     Problem: Depression/Self Harm  Goal: Effect of psychiatric condition will be minimized and patient will be protected from self harm  Description: INTERVENTIONS:  1. Assess impact of patient's symptoms on level of functioning, self care needs and offer support as indicated  2. Assess patient/family knowledge of depression, impact on illness and need for teaching  3. Provide emotional support, presence and reassurance  4. Assess for possible suicidal thoughts or ideation. If patient expresses suicidal thoughts or statements do not leave alone, initiate Suicide Precautions, move to a room close to the nursing station and obtain sitter  5.  Initiate consults as appropriate with Mental Health Professional, Spiritual Care, Psychosocial CNS, and consider a recommendation to the LIP for a Psychiatric Consultation  Outcome: Progressing

## 2023-07-08 NOTE — BH NOTE
Pt visible in the milieu, alert and oriented x 4, communicated appropriately. Pt present as calm and cooperative, verbalized feeling okay. On assessment, denied anxiety, depression, SI, HI, and AVH. PRN melatonin administered. Med's and meals compliant. No behaviors noted. Vital signs entered. Rounding in progress. Pt slept for the total of 8 hours.

## 2023-07-08 NOTE — BH NOTE
Pt is alert and oriented x 4. She is calm and cooperative. Pt has with no complaints of pain. She is meal and med compliant denying all issues such as SI/HI, AVH, anxiety and depression.

## 2023-07-09 PROCEDURE — 6370000000 HC RX 637 (ALT 250 FOR IP): Performed by: PSYCHIATRY & NEUROLOGY

## 2023-07-09 PROCEDURE — 1240000000 HC EMOTIONAL WELLNESS R&B

## 2023-07-09 RX ADMIN — PREDNISONE 60 MG: 20 TABLET ORAL at 07:57

## 2023-07-09 RX ADMIN — LISINOPRIL 30 MG: 5 TABLET ORAL at 07:57

## 2023-07-09 RX ADMIN — SENNOSIDES AND DOCUSATE SODIUM 2 TABLET: 50; 8.6 TABLET ORAL at 08:57

## 2023-07-09 RX ADMIN — LURASIDONE HYDROCHLORIDE 40 MG: 40 TABLET, FILM COATED ORAL at 16:44

## 2023-07-09 RX ADMIN — Medication 9 MG: at 20:48

## 2023-07-09 RX ADMIN — FOLIC ACID 3 MG: 1 TABLET ORAL at 07:57

## 2023-07-09 RX ADMIN — MIRTAZAPINE 30 MG: 15 TABLET, FILM COATED ORAL at 20:48

## 2023-07-09 ASSESSMENT — PAIN SCALES - GENERAL
PAINLEVEL_OUTOF10: 0
PAINLEVEL_OUTOF10: 0

## 2023-07-09 NOTE — BH NOTE
Writer met patient resting in the room, alert and oriented x 4, communicated appropriately. Pt present as calm and cooperative, verbalized feeling okay. On assessment, denied anxiety, depression, SI, HI, and AVH. PRN melatonin administered. Med's and meals compliant. No behaviors noted. Vital signs entered. Rounding in progress. Pt slept for the total of 7 hours.

## 2023-07-09 NOTE — PLAN OF CARE
Problem: Decision Making  Goal: Pt/Family able to effectively weigh alternatives and participate in decision making related to treatment and care  Description: INTERVENTIONS:  1. Determine when there are differences between patient's view, family's view, and healthcare provider's view of condition  2. Facilitate patient and family articulation of goals for care  3. Help patient and family identify pros/cons of alternative solutions  4. Provide information as requested by patient/family  5. Respect patient/family right to receive or not to receive information  6. Serve as a liaison between patient and family and health care team  7.  Initiate Consults from Ethics, Palliative Care or initiate [id number] as is appropriate  Outcome: Progressing

## 2023-07-09 NOTE — PROGRESS NOTES
Pt alert and oriented x 4. Pt Sitting quietly in room. Pt calm and cooperative. Pt denies SI/HI/AVH depression and anxiety. No c/o pain. Pt compliant with meals and medication. Pt encouraged to participate in care. q15 minute checks maintained for safety.

## 2023-07-10 PROCEDURE — 1240000000 HC EMOTIONAL WELLNESS R&B

## 2023-07-10 PROCEDURE — 6370000000 HC RX 637 (ALT 250 FOR IP): Performed by: PSYCHIATRY & NEUROLOGY

## 2023-07-10 RX ADMIN — FOLIC ACID 3 MG: 1 TABLET ORAL at 09:12

## 2023-07-10 RX ADMIN — LURASIDONE HYDROCHLORIDE 40 MG: 40 TABLET, FILM COATED ORAL at 16:55

## 2023-07-10 RX ADMIN — PREDNISONE 60 MG: 20 TABLET ORAL at 09:12

## 2023-07-10 RX ADMIN — MIRTAZAPINE 30 MG: 15 TABLET, FILM COATED ORAL at 21:06

## 2023-07-10 RX ADMIN — HYDROXYZINE HYDROCHLORIDE 25 MG: 25 TABLET, FILM COATED ORAL at 12:34

## 2023-07-10 RX ADMIN — SENNOSIDES AND DOCUSATE SODIUM 2 TABLET: 50; 8.6 TABLET ORAL at 09:19

## 2023-07-10 RX ADMIN — LISINOPRIL 30 MG: 5 TABLET ORAL at 09:11

## 2023-07-10 RX ADMIN — Medication 9 MG: at 21:10

## 2023-07-10 ASSESSMENT — PAIN SCALES - GENERAL
PAINLEVEL_OUTOF10: 0
PAINLEVEL_OUTOF10: 0

## 2023-07-10 NOTE — BH NOTE
10:12 am: LUCIE called Emile Shah to set up pt follow up appointment for therapy. Pt reports she has an appointment with [facility] psychiatry in August. LUCIE left voicemail for Emile Shah.       Contact info:  Emile Shah LCSW  [facility]  [address] [place] [address] , 1954 [facility]  [phone]    LUIS Montesinos

## 2023-07-10 NOTE — BH NOTE
Pt is calm and cooperative. Alert. Pt is visible on the unit. Pt is accepting meals and medications. Pt is socializing with peers. Pt denies si/hi/a/v gallardo. Depression. Endorses anxiety. Requested and received hydroxyzine 25mg po for and senokot for constipation. Will continue to monitor pt

## 2023-07-10 NOTE — BH NOTE
Group Therapy    Date: 7/10/2023  Start Time: 10:30  End Time:  11:15    Type of Group: Psycho-education    Topic: CBT Thinking Errors    Purpose:  Group facilitator introduced thinking errors to participants in effort to discuss how irrational beliefs contribute to uncomfortable emotions and unwanted behaviors. Group facilitator reviewed 10 different thinking errors that are common for people with detailed examples of each cognitive distortion. Group members were able to identify which thinking error they relate to and how this distortion and the impacts of this thinking error in different areas of their lives. Attendance: PATIENT DID NOT ATTEND GROUP    Progress towards goals/ treatment: Patient did not attend group, patient will need to attend group therapy as part of their treatment in effort to identify appropriate coping skills to manage their current mental health symptoms.              Signature:  Ant Valles, supervisee in social work

## 2023-07-10 NOTE — BH NOTE
Patient is visible in milieu, alert and oriented x 4, interacted well. Pt present as calm and cooperative, verbalized feeling okay. On assessment, denied anxiety, depression, SI, HI, and AVH. PRN melatonin administered. Med's and meals compliant. No behaviors noted. Vital signs entered. Rounding in progress. Pt slept for the total of 8 hours.

## 2023-07-10 NOTE — BH NOTE
Behavioral Health Treatment Team Note     Patient goal(s) for today: to make my plans to go home  Treatment team focus/goals: Continue to monitor medication, adjust as needed, schedule meeting with family, follow ups    Progress note: Pt presents ao x 4. Pt presents calm and cooperative with writer. Pt denies anxiety, depression, SI/HI/AVH. Pt does not endorse feelings of sadness or crying today. Pt interacting with peers and staff well. SW tried to call outpatient therapist, Octavia Grider to schedule follow up for pt. SW will do this prior to discharge. SW calling [facility] Thursday to set up in home stabilization services. SW having meeting with pt family on Wednesday to discuss pt follow ups and discharge time.  Pt discharging Friday after ECT.    LOS:  23  Expected LOS: 27    Insurance info/prescription coverage:  MEDICARE PART A  Date of last family contact:  7/5/23  Family requesting physician contact today:  No  Discharge plan:  Return home after ECT  Guns in the home:  No  Outpatient provider(s):  [address], [facility]    Participating treatment team members: LUIS Ruelas

## 2023-07-10 NOTE — PLAN OF CARE
Problem: Decision Making  Goal: Pt/Family able to effectively weigh alternatives and participate in decision making related to treatment and care  Description: INTERVENTIONS:  1. Determine when there are differences between patient's view, family's view, and healthcare provider's view of condition  2. Facilitate patient and family articulation of goals for care  3. Help patient and family identify pros/cons of alternative solutions  4. Provide information as requested by patient/family  5. Respect patient/family right to receive or not to receive information  6. Serve as a liaison between patient and family and health care team  7.  Initiate Consults from Ethics, Palliative Care or initiate [id number] [place] as is appropriate  Outcome: Progressing  Flowsheets (Taken 7/9/2023 0800 by Darci Garcia RN)  Patient/family able to effectively weigh alternatives and participate in decision making related to treatment and care: Provide information as requested by patient/family

## 2023-07-10 NOTE — GROUP NOTE
Group Therapy Note    Date: 7/10/2023    Group Start Time: 0900  Group End Time: 1000  Group Topic: Topic Group    [place] [address] ACUTE BEHAV [facility]        Group Therapy Note    Attendees:        Patient's Goal:  To participate in relaxation activity    Notes:  Pt did not attend session  Discipline Responsible: Recreational Therapist      Signature:  Rodolfo Greenberg

## 2023-07-11 ENCOUNTER — ANESTHESIA (OUTPATIENT)
Facility: HOSPITAL | Age: 68
DRG: 885 | End: 2023-07-11
Payer: MEDICARE

## 2023-07-11 PROCEDURE — 7100000001 HC PACU RECOVERY - ADDTL 15 MIN: Performed by: PSYCHIATRY & NEUROLOGY

## 2023-07-11 PROCEDURE — 90870 ELECTROCONVULSIVE THERAPY: CPT | Performed by: PSYCHIATRY & NEUROLOGY

## 2023-07-11 PROCEDURE — 1240000000 HC EMOTIONAL WELLNESS R&B

## 2023-07-11 PROCEDURE — 7100000000 HC PACU RECOVERY - FIRST 15 MIN: Performed by: PSYCHIATRY & NEUROLOGY

## 2023-07-11 PROCEDURE — 2709999900 HC NON-CHARGEABLE SUPPLY: Performed by: PSYCHIATRY & NEUROLOGY

## 2023-07-11 PROCEDURE — 6370000000 HC RX 637 (ALT 250 FOR IP): Performed by: PSYCHIATRY & NEUROLOGY

## 2023-07-11 PROCEDURE — 3700000000 HC ANESTHESIA ATTENDED CARE: Performed by: PSYCHIATRY & NEUROLOGY

## 2023-07-11 PROCEDURE — 6360000002 HC RX W HCPCS: Performed by: ANESTHESIOLOGY

## 2023-07-11 PROCEDURE — 2500000003 HC RX 250 WO HCPCS: Performed by: ANESTHESIOLOGY

## 2023-07-11 RX ORDER — SUCCINYLCHOLINE/SOD CL,ISO/PF 100 MG/5ML
SYRINGE (ML) INTRAVENOUS PRN
Status: DISCONTINUED | OUTPATIENT
Start: 2023-07-11 | End: 2023-07-11 | Stop reason: SDUPTHER

## 2023-07-11 RX ORDER — SODIUM CHLORIDE 9 MG/ML
INJECTION, SOLUTION INTRAVENOUS CONTINUOUS
Status: DISCONTINUED | OUTPATIENT
Start: 2023-07-11 | End: 2023-07-11 | Stop reason: HOSPADM

## 2023-07-11 RX ORDER — SODIUM CHLORIDE 9 MG/ML
INJECTION, SOLUTION INTRAVENOUS PRN
Status: DISCONTINUED | OUTPATIENT
Start: 2023-07-11 | End: 2023-07-11 | Stop reason: HOSPADM

## 2023-07-11 RX ORDER — KETOROLAC TROMETHAMINE 30 MG/ML
INJECTION, SOLUTION INTRAMUSCULAR; INTRAVENOUS PRN
Status: DISCONTINUED | OUTPATIENT
Start: 2023-07-11 | End: 2023-07-11 | Stop reason: SDUPTHER

## 2023-07-11 RX ORDER — SODIUM CHLORIDE 0.9 % (FLUSH) 0.9 %
5-40 SYRINGE (ML) INJECTION PRN
Status: DISCONTINUED | OUTPATIENT
Start: 2023-07-11 | End: 2023-07-11 | Stop reason: HOSPADM

## 2023-07-11 RX ORDER — SODIUM CHLORIDE 0.9 % (FLUSH) 0.9 %
5-40 SYRINGE (ML) INJECTION EVERY 12 HOURS SCHEDULED
Status: DISCONTINUED | OUTPATIENT
Start: 2023-07-11 | End: 2023-07-11 | Stop reason: HOSPADM

## 2023-07-11 RX ORDER — FLUOXETINE HYDROCHLORIDE 20 MG/1
20 CAPSULE ORAL DAILY
Status: DISCONTINUED | OUTPATIENT
Start: 2023-07-12 | End: 2023-07-12

## 2023-07-11 RX ADMIN — Medication 40 MG: at 08:09

## 2023-07-11 RX ADMIN — Medication 30 MG: at 08:09

## 2023-07-11 RX ADMIN — FOLIC ACID 3 MG: 1 TABLET ORAL at 09:10

## 2023-07-11 RX ADMIN — PREDNISONE 60 MG: 20 TABLET ORAL at 09:09

## 2023-07-11 RX ADMIN — KETOROLAC TROMETHAMINE 30 MG: 30 INJECTION INTRAMUSCULAR; INTRAVENOUS at 08:06

## 2023-07-11 RX ADMIN — LISINOPRIL 30 MG: 5 TABLET ORAL at 09:10

## 2023-07-11 RX ADMIN — Medication 9 MG: at 21:35

## 2023-07-11 RX ADMIN — SENNOSIDES AND DOCUSATE SODIUM 2 TABLET: 50; 8.6 TABLET ORAL at 12:29

## 2023-07-11 RX ADMIN — MIRTAZAPINE 30 MG: 15 TABLET, FILM COATED ORAL at 21:35

## 2023-07-11 RX ADMIN — LURASIDONE HYDROCHLORIDE 40 MG: 40 TABLET, FILM COATED ORAL at 16:50

## 2023-07-11 ASSESSMENT — PAIN - FUNCTIONAL ASSESSMENT
PAIN_FUNCTIONAL_ASSESSMENT: ADULT NONVERBAL PAIN SCALE (NPVS)
PAIN_FUNCTIONAL_ASSESSMENT: ADULT NONVERBAL PAIN SCALE (NPVS)
PAIN_FUNCTIONAL_ASSESSMENT: NONE - DENIES PAIN

## 2023-07-11 ASSESSMENT — PAIN SCALES - GENERAL: PAINLEVEL_OUTOF10: 0

## 2023-07-11 NOTE — PERIOP NOTE
Spoke with Aleks Sweeney RN, [address], to advise patient scheduled for ECT procedure 7/12/2023, NPO after midnight, arrive PACU 0630.

## 2023-07-11 NOTE — GROUP NOTE
Group Therapy Note    Date: 7/11/2023    Group Start Time: 1500  Group End Time: 1600  Group Topic: Recreational    [facility] ACUTE BEHAV HLTH    [address]        Group Therapy Note    Attendees: 6       Patient's Goal:  To concentrate on selected task    Notes:  Pt did not attend session    Discipline Responsible: Recreational Therapist      Signature:  Chales Sever

## 2023-07-11 NOTE — PERIOP NOTE
Renetta Felix  1955  [id number]    Situation:  Verbal report given from: Dr. Juan Luis Johnson and Sarthak Lugo RN  Procedure: Procedure(s):  ELECTROCONVULSIVE THERAPY    Background:    Preoperative diagnosis: Major depressive disorder, recurrent episode, moderate ([address]) [F33.1]    Postoperative diagnosis: * No post-op diagnosis entered *    :  Dr. Inocencio Arriaga    Assistant(s): Circulator: Rebel Barton RN  Scrub Person First: Danya Ford    Specimens: * No specimens in log *    Assessment:  Intra-procedure medications         Anesthesia gave intra-procedure sedation and medications, see anesthesia flow sheet     Intravenous fluids: LR@ KVO     Vital signs stable

## 2023-07-11 NOTE — PROGRESS NOTES
Patient denied SI, HI, AVH and pain. She was med compliant and had a snack. She received melatonin for sleep and appears to be sleeping fairly well. NPO past MN for ECT in the morning.

## 2023-07-11 NOTE — PROCEDURES
ECT PROCEDURE NOTE      IDENTIFICATION:    Patient Name  Mary Vega   Date of Birth 1955   [facility] [id number]   Medical Record Number  [id number]      Age  78 y.o. PCP Wilman Cahu MD   Admit date:  6/17/2023    Room Number  OR/PL  @ [facility]   Date of Service  7/11/2023            Electro Convulsive Therapy:  Treatment number 6        Maintenance ECT - NO   Laura Wade was admitted to the PACU for ECT. Patient was medically cleared and NPO for procedure. Patient is not court mandated and gave informed consent for ECT treatment. Patient received     Bilateral ECT - YES    Administered using the [address] [place] [address]. at 40 % Energy, under general anesthesia. Laura Wade with a good, well generalized seizure of 40 seconds on observation of the motor manifestations of the seizure. EEG duration was NA secs. Post-Ictal Suppression Index: NA %  Recovery was unremarkable and with no complications. Change in Energy %:            Anesthesia medications administered during procedure:  Brevital:  40 mg  Change for next treament:     Succinylcholine: 30 mg  Change for next treatment:      Propofol: mg  Glycopyrrolate:  mg  Labetalol:  mg  Esmolol:  mg  Lorazepam:  mg  Ketamine:  mg  Zofran:   mg    Toradol: 30 mg  Lidocaine:  mg  Caffeine Benzoate: mg       No flowsheet data found.     SIGNED:    Rupa Rae MD  7/11/2023

## 2023-07-11 NOTE — PERIOP NOTE
TRANSFER - OUT REPORT:    Verbal report given to Som Castellano on [place]  being transferred to [facility] for routine post-op       Report consisted of patient's Situation, Background, Assessment and   Recommendations(SBAR). Information from the following report(s) Nurse Handoff Report was reviewed with the receiving nurse. Lines:       Opportunity for questions and clarification was provided.       Patient transported with:  [employer]

## 2023-07-11 NOTE — BH NOTE
Chief Complaint:  Suicidal ideation     Length of Stay: 23 Days    Interval History:  Ms. Benny Villa is doing poorly. Says she barely slept last night and woke up feeling \"horrible\". Describes her mood as hopeless and has passive thoughts of dying. She remains isolative to her room and no energy or motivation. Calm and cooperative during the interview. Remains motivated to get started on ECT. At the present time the patient Laura Wade remains compliant with taking medications. Denies any adverse events from taking them and feels they have been beneficial.     6/19 - Laura Wade reports feeling good post ECT having had a headache treated with tylenol. No memory loss. Moods are good currently. Appropriately dressed and groomed. Denies SI/HI/AH/VH. No aggression or violence. Appropriately interactive and aware. Tolerating medications well. Eating and sleeping fairly. Wants to stay off of the Gabapentin. 6/20 - Laura Wade reports feeling good post ECT yesterday, but became restless as the day progressed. Moods are good. Appropriately dressed and groomed. Denies SI/HI/AH/VH. No aggression or violence. Appropriately interactive and aware. Tolerating medications well. Eating and sleeping poorly. 6/21 - Laura Wade reports feeling tired with a migraine post ECT # 2. Motrin helping a little but feels the headache is worse. No memory loss. Moods are depressed 7/10 post procedure. Appropriately dressed and groomed. Denies SI/HI/AH/VH. No aggression or violence. Appropriately interactive and aware. Tolerating medications well. Eating and sleeping fairly. Insight is fair and judgement is fair. 6/22 - Laura Wade reports feeling less spry than after the first ECT however notes improvements overall. Moods are fair. Appropriately dressed and groomed. Concerned about the headache she is getting and if it will be pretreated. Denies SI/HI/AH/VH.   No aggression or violence. Appropriately interactive and aware. Tolerating medications well. Eating and sleeping fairly. Insight is fair and judgement is fair. 6/23 - Laura DAVID Wade reports feeling well and moods are fair. Has a slight headache post ECT # 3 with some bleeding from her right ear. Was pretreated for headache decreasing it significantly from the previous treatments headache. Head CT clear and hospitalist consulted for management. Appropriately dressed and groomed. Denies SI/HI/AH/VH. No aggression or violence. Appropriately interactive and aware. Tolerating medications well. Eating and sleeping fairly. 6/24  - Laura DAVID Wade says she is doing well. Calm and pleasant. Denies si hi or avh. Sleeping and eating ok. No agitation or aggression. Compliant with meds and no adverse effects noted. Out in the unit, social with staff and peers. 6/25 - Laura DAVID Wade says she is doing well. Calm and pleasant. Denies si hi or avh. Sleeping and eating ok. No agitation or aggression. Compliant with meds and no adverse effects noted. Out in the unit, social with staff and peers. 6/26 - Laura DAVID Wade reports feeling alright and moods are fair. Playing solitaire thinking about what she will do when at home. Discussed her hobbies likes and wants. Looking forward to ECT hoping not to have any bleeding from her ears. Appropriately dressed and groomed. Denies SI/HI/AH/VH. No aggression or violence. Appropriately interactive and aware. Tolerating medications well. Eating and sleeping fairly. 6/27 - Laura HERNANDEZ Sheri reports feeling well and moods are normal.  Concerned that she did not get ECT today and what that means for her long term plans. Appropriately dressed and groomed. Denies SI/HI/AH/VH. No aggression or violence. Appropriately interactive and aware. Tolerating medications well. Eating and sleeping fairly.     6/28 - Laura HERNANDEZ Sheri reports feeling depersonalized basis, active treatment furnished directly by or requiring the supervision of inpatient psychiatric facility personnel. In addition, the hospital records show that services furnished were intensive treatment services, admission or related services, or equivalent services.

## 2023-07-11 NOTE — PLAN OF CARE
Problem: Anxiety  Goal: Will report anxiety at manageable levels  Description: INTERVENTIONS:  1. Administer medication as ordered  2. Teach and rehearse alternative coping skills  3. Provide emotional support with 1:1 interaction with staff  Outcome: Progressing     Problem: Decision Making  Goal: Pt/Family able to effectively weigh alternatives and participate in decision making related to treatment and care  Description: INTERVENTIONS:  1. Determine when there are differences between patient's view, family's view, and healthcare provider's view of condition  2. Facilitate patient and family articulation of goals for care  3. Help patient and family identify pros/cons of alternative solutions  4. Provide information as requested by patient/family  5. Respect patient/family right to receive or not to receive information  6. Serve as a liaison between patient and family and health care team  7. Initiate Consults from Ethics, Palliative Care or initiate [employer] as is appropriate  Outcome: Progressing     Problem: Behavior  Goal: Pt/Family maintain appropriate behavior and adhere to behavioral management agreement, if implemented  Description: INTERVENTIONS:  1. Assess patient/family's coping skills and  non-compliant behavior (including use of illegal substances)  2. Notify security of behavior or suspected illegal substances which indicate the need for search of the family and/or belongings  3. Encourage verbalization of thoughts and concerns in a socially appropriate manner  4. Utilize positive, consistent limit setting strategies supporting safety of patient, staff and others  5. Encourage participation in the decision making process about the behavioral management agreement  6. If a visitor's behavior poses a threat to safety call refer to organization policy.   7. Initiate consult with , Psychosocial CNS, Spiritual Care as appropriate  Outcome: Progressing     Problem:

## 2023-07-11 NOTE — GROUP NOTE
Group Therapy Note    Date: 7/11/2023    Group Start Time: 0900  Group End Time: 1000  Group Topic: Topic Group    [place] [address] ACUTE BEHAV 300 [facility]        Group Therapy Note    Attendees: 7       Patient's Goal:  To participate in chair exercise routine    Notes:  Pt did not attend session    Discipline Responsible: Recreational Therapist      Signature:  Lilian Resendiz

## 2023-07-11 NOTE — ANESTHESIA POSTPROCEDURE EVALUATION
Department of Anesthesiology  Postprocedure Note    Patient: Francesco Fernández  MRN: [id number]  YOB: 1955  Date of evaluation: 7/11/2023      Procedure Summary     Date: 07/11/23 Room / Location: [facility] OR R2 / [facility] OR    Anesthesia Start: 0802 Anesthesia Stop: 2676    Procedure: ELECTROCONVULSIVE THERAPY (Head) Diagnosis:       Major depressive disorder, recurrent episode, moderate (HCC)      (Major depressive disorder, recurrent episode, moderate ([address]) [F33.1])    Surgeons: Anthony Day MD Responsible Provider: Claudean Miyamoto, MD    Anesthesia Type: general ASA Status: 2          Anesthesia Type: No value filed.     Virgen Phase I: Virgen Score: 10    Virgen Phase II: Virgen Score: 10      Anesthesia Post Evaluation    Patient location during evaluation: PACU  Patient participation: complete - patient participated  Level of consciousness: awake and alert  Airway patency: patent  Nausea & Vomiting: no vomiting and no nausea  Complications: no  Cardiovascular status: hemodynamically stable  Respiratory status: acceptable  Hydration status: stable

## 2023-07-11 NOTE — BH NOTE
Pt is calm and cooperative. Alert. Pt is visible on the unit. Pt is accepting meals and medications. Pt is socializing with peers. Pt denies si/hi/a/v gallardo. Depression. Endorses anxiety. Pt was given senokot for constipation. Will continue to monitor pt

## 2023-07-11 NOTE — BH NOTE
Behavioral Health Treatment Team Note     Patient goal(s) for today: to plan to go home  Treatment team focus/goals: continue ECT, put follow ups in place    Progress note: Pt presents ao x 4. Pt denies SI/HI/AVH. Pt continues tolerate ECT well. Pt set to discharge after her final ECT treatment on Friday. SW calling [facility] crisis referral line to set up in home crisis stabilization services for the pt prior to discharge. [facility] is unable to do this referral until Thursday as Adam Liz wants [facility] to be sure pt is leaving by a certain date. SW calling them Thursday to begin services for pt Monday as they do not start on weekends. Pt will return home after discharge and her friends will came stay with her for safety planning. [facility] called pt therapist, Crystal Bernal, this morning to set up follow up but did not get an answer. [facility] will follow up to set up therapy follow up.     LOS:  24  Expected LOS: 27    Insurance info/prescription coverage:  MEDICARE PART A  Date of last family contact:  7/5/23  Family requesting physician contact today:  No  Discharge plan:  Return home after ECT  Guns in the home:  No  Outpatient provider(s):  [address], [facility]     Participating treatment team members: LUIS Schumacher

## 2023-07-12 ENCOUNTER — ANESTHESIA (OUTPATIENT)
Facility: HOSPITAL | Age: 68
DRG: 885 | End: 2023-07-12
Payer: MEDICARE

## 2023-07-12 PROCEDURE — 7100000011 HC PHASE II RECOVERY - ADDTL 15 MIN: Performed by: PSYCHIATRY & NEUROLOGY

## 2023-07-12 PROCEDURE — 6370000000 HC RX 637 (ALT 250 FOR IP): Performed by: NURSE PRACTITIONER

## 2023-07-12 PROCEDURE — 1240000000 HC EMOTIONAL WELLNESS R&B

## 2023-07-12 PROCEDURE — 90870 ELECTROCONVULSIVE THERAPY: CPT | Performed by: PSYCHIATRY & NEUROLOGY

## 2023-07-12 PROCEDURE — 2500000003 HC RX 250 WO HCPCS: Performed by: ANESTHESIOLOGY

## 2023-07-12 PROCEDURE — 6360000002 HC RX W HCPCS: Performed by: PSYCHIATRY & NEUROLOGY

## 2023-07-12 PROCEDURE — 2709999900 HC NON-CHARGEABLE SUPPLY: Performed by: PSYCHIATRY & NEUROLOGY

## 2023-07-12 PROCEDURE — 7100000000 HC PACU RECOVERY - FIRST 15 MIN: Performed by: PSYCHIATRY & NEUROLOGY

## 2023-07-12 PROCEDURE — 3700000000 HC ANESTHESIA ATTENDED CARE: Performed by: PSYCHIATRY & NEUROLOGY

## 2023-07-12 PROCEDURE — 6360000002 HC RX W HCPCS: Performed by: ANESTHESIOLOGY

## 2023-07-12 PROCEDURE — 6370000000 HC RX 637 (ALT 250 FOR IP): Performed by: PSYCHIATRY & NEUROLOGY

## 2023-07-12 PROCEDURE — 7100000001 HC PACU RECOVERY - ADDTL 15 MIN: Performed by: PSYCHIATRY & NEUROLOGY

## 2023-07-12 PROCEDURE — 7100000010 HC PHASE II RECOVERY - FIRST 15 MIN: Performed by: PSYCHIATRY & NEUROLOGY

## 2023-07-12 RX ORDER — SODIUM CHLORIDE 9 MG/ML
INJECTION, SOLUTION INTRAVENOUS PRN
Status: DISCONTINUED | OUTPATIENT
Start: 2023-07-12 | End: 2023-07-12 | Stop reason: HOSPADM

## 2023-07-12 RX ORDER — SUCCINYLCHOLINE/SOD CL,ISO/PF 100 MG/5ML
SYRINGE (ML) INTRAVENOUS PRN
Status: DISCONTINUED | OUTPATIENT
Start: 2023-07-12 | End: 2023-07-12 | Stop reason: SDUPTHER

## 2023-07-12 RX ORDER — SODIUM CHLORIDE 0.9 % (FLUSH) 0.9 %
5-40 SYRINGE (ML) INJECTION PRN
Status: DISCONTINUED | OUTPATIENT
Start: 2023-07-12 | End: 2023-07-12 | Stop reason: HOSPADM

## 2023-07-12 RX ORDER — SODIUM CHLORIDE 0.9 % (FLUSH) 0.9 %
5-40 SYRINGE (ML) INJECTION EVERY 12 HOURS SCHEDULED
Status: DISCONTINUED | OUTPATIENT
Start: 2023-07-12 | End: 2023-07-12 | Stop reason: HOSPADM

## 2023-07-12 RX ORDER — KETOROLAC TROMETHAMINE 30 MG/ML
INJECTION, SOLUTION INTRAMUSCULAR; INTRAVENOUS PRN
Status: DISCONTINUED | OUTPATIENT
Start: 2023-07-12 | End: 2023-07-12 | Stop reason: SDUPTHER

## 2023-07-12 RX ORDER — SODIUM CHLORIDE 9 MG/ML
INJECTION, SOLUTION INTRAVENOUS CONTINUOUS
Status: DISCONTINUED | OUTPATIENT
Start: 2023-07-12 | End: 2023-07-12

## 2023-07-12 RX ADMIN — LISINOPRIL 30 MG: 5 TABLET ORAL at 10:09

## 2023-07-12 RX ADMIN — METHOTREXATE 15 MG: 2.5 TABLET ORAL at 10:10

## 2023-07-12 RX ADMIN — PREDNISONE 60 MG: 20 TABLET ORAL at 10:09

## 2023-07-12 RX ADMIN — Medication 30 MG: at 09:05

## 2023-07-12 RX ADMIN — KETOROLAC TROMETHAMINE 30 MG: 30 INJECTION INTRAMUSCULAR; INTRAVENOUS at 09:03

## 2023-07-12 RX ADMIN — LURASIDONE HYDROCHLORIDE 40 MG: 40 TABLET, FILM COATED ORAL at 17:26

## 2023-07-12 RX ADMIN — Medication 9 MG: at 21:00

## 2023-07-12 RX ADMIN — FLUOXETINE 20 MG: 20 CAPSULE ORAL at 10:08

## 2023-07-12 RX ADMIN — MIRTAZAPINE 30 MG: 15 TABLET, FILM COATED ORAL at 21:01

## 2023-07-12 RX ADMIN — FOLIC ACID 3 MG: 1 TABLET ORAL at 10:09

## 2023-07-12 RX ADMIN — Medication 40 MG: at 09:05

## 2023-07-12 RX ADMIN — SENNOSIDES AND DOCUSATE SODIUM 2 TABLET: 50; 8.6 TABLET ORAL at 10:15

## 2023-07-12 ASSESSMENT — PAIN - FUNCTIONAL ASSESSMENT
PAIN_FUNCTIONAL_ASSESSMENT: NONE - DENIES PAIN

## 2023-07-12 ASSESSMENT — PAIN SCALES - GENERAL: PAINLEVEL_OUTOF10: 0

## 2023-07-12 NOTE — PROCEDURES
ECT PROCEDURE NOTE      IDENTIFICATION:    Patient Name  Mary Vega   Date of Birth 1955   [facility] [id number]   Medical Record Number  [id number]      Age  78 y.o. PCP Wilman Chau MD   Admit date:  6/17/2023    Room Number  OR/PL  @ [facility]   Date of Service  7/12/2023            Electro Convulsive Therapy:  Treatment number 7        Maintenance ECT - NO   Laura Wade was admitted to the PACU for ECT. Patient was medically cleared and NPO for procedure. Patient is not court mandated and gave informed consent for ECT treatment. Patient received     Bilateral ECT - YES    Administered using the [address] [place] [address]. at 40 % Energy, under general anesthesia. Laura Wade with a good, well generalized seizure of 30 seconds on observation of the motor manifestations of the seizure. EEG duration was NA secs. Post-Ictal Suppression Index: NA %  Recovery was unremarkable and with no complications. Change in Energy %:            Anesthesia medications administered during procedure:  Brevital:  40 mg  Change for next treament:     Succinylcholine: 30 mg  Change for next treatment:      Propofol: mg  Glycopyrrolate:  mg  Labetalol:  mg  Esmolol:  mg  Lorazepam:  mg  Ketamine:  mg  Zofran:   mg    Toradol: 30 mg  Lidocaine:  mg  Caffeine Benzoate: mg       No flowsheet data found.     SIGNED:    Rupa Rae MD  7/12/2023

## 2023-07-12 NOTE — ANESTHESIA PRE PROCEDURE
filed for this visit. BP Readings from Last 3 Encounters:   07/11/23 132/78   06/16/23 (!) 138/91   05/18/23 107/71       NPO Status:                                                                                 BMI:   Wt Readings from Last 3 Encounters:   06/23/23 52.2 kg (115 lb)   06/11/23 48.8 kg (107 lb 8 oz)   05/14/23 58.5 kg (129 lb)     There is no height or weight on file to calculate BMI.    CBC:   Lab Results   Component Value Date/Time    WBC 3.5 06/25/2023 05:15 AM    RBC 3.37 06/25/2023 05:15 AM    HGB 11.1 06/25/2023 05:15 AM    HCT 33.0 06/25/2023 05:15 AM    MCV 97.9 06/25/2023 05:15 AM    RDW 12.4 06/25/2023 05:15 AM     06/25/2023 05:15 AM       CMP:   Lab Results   Component Value Date/Time     06/25/2023 05:15 AM    K 4.0 06/25/2023 05:15 AM     06/25/2023 05:15 AM    CO2 30 06/25/2023 05:15 AM    BUN 25 06/25/2023 05:15 AM    CREATININE 0.85 06/25/2023 05:15 AM    AGRATIO 1.1 02/14/2023 04:51 AM    LABGLOM >60 06/25/2023 05:15 AM    GLUCOSE 87 06/25/2023 05:15 AM    PROT 5.4 06/25/2023 05:15 AM    CALCIUM 8.0 06/25/2023 05:15 AM    BILITOT 0.7 06/25/2023 05:15 AM    ALKPHOS 52 06/25/2023 05:15 AM    ALKPHOS 54 02/14/2023 04:51 AM    AST 13 06/25/2023 05:15 AM    ALT 20 06/25/2023 05:15 AM       POC Tests: No results for input(s): POCGLU, POCNA, POCK, POCCL, POCBUN, POCHEMO, POCHCT in the last 72 hours.     Coags:   Lab Results   Component Value Date/Time    PROTIME 10.7 02/12/2023 08:17 PM    INR 1.0 02/12/2023 08:17 PM       HCG (If Applicable): No results found for: PREGTESTUR, PREGSERUM, HCG, HCGQUANT     ABGs: No results found for: PHART, PO2ART, XIR4BIK, XXR3EUS, BEART, U9AJMSFZ     Type & Screen (If Applicable):  No results found for: LABABO, LABRH    Drug/Infectious Status (If Applicable):  No results found for: HIV, HEPCAB    COVID-19 Screening (If Applicable):   Lab Results   Component Value Date/Time    COVID19 Not

## 2023-07-12 NOTE — GROUP NOTE
Group Therapy Note    Date: 7/12/2023    Group Start Time: 0900  Group End Time: 1000  Group Topic: Topic Group    [address] 3 ACUTE BEHAV [facility]        Group Therapy Note    Attendees: 6       Patient's Goal:  To participate in stress management Sencha game    Notes:  Pt did not attend session    Discipline Responsible: Recreational Therapist      Signature:  Florence Shukla

## 2023-07-12 NOTE — PLAN OF CARE
Problem: Anxiety  Goal: Will report anxiety at manageable levels  Description: INTERVENTIONS:  1. Administer medication as ordered  2. Teach and rehearse alternative coping skills  3. Provide emotional support with 1:1 interaction with staff  Outcome: Progressing  Flowsheets (Taken 7/12/2023 0920)  Will report anxiety at manageable levels: Administer medication as ordered     Problem: Change in Body Image  Goal: Pt/Family communicate acceptance of loss or change in body image and feel psychological comfort and peace  Description: INTERVENTIONS:  1. Assess patient/family anxiety and grief process related to change in body image, loss of functional status, loss of sense of self, and forgiveness  2. Provide emotional and spiritual support  3. Provide information about the patient's health status with consideration of family and cultural values  4. Communicate willingness to discuss loss and facilitate grief process with patient/family as appropriate  5. Emphasize sustaining relationships within family system and community, or veronica/spiritual traditions  6. Initiate Spiritual Care, Psychosocial Clinical Specialist consult as needed  Outcome: Progressing     Problem: Decision Making  Goal: Pt/Family able to effectively weigh alternatives and participate in decision making related to treatment and care  Description: INTERVENTIONS:  1. Determine when there are differences between patient's view, family's view, and healthcare provider's view of condition  2. Facilitate patient and family articulation of goals for care  3. Help patient and family identify pros/cons of alternative solutions  4. Provide information as requested by patient/family  5. Respect patient/family right to receive or not to receive information  6. Serve as a liaison between patient and family and health care team  7.  Initiate Consults from Ethics, Palliative Care or initiate [phone] as is appropriate  Outcome: Progressing  Flowsheets (Taken 7/12/2023 7105)  Patient/family able to effectively weigh alternatives and participate in decision making related to treatment and care: Provide information as requested by patient/family     Problem: Confusion  Goal: Confusion, delirium, dementia, or psychosis is improved or at baseline  Description: INTERVENTIONS:  1. Assess for possible contributors to thought disturbance, including medications, impaired vision or hearing, underlying metabolic abnormalities, dehydration, psychiatric diagnoses, and notify attending LIP  2. Powell high risk fall precautions, as indicated  3. Provide frequent short contacts to provide reality reorientation, refocusing and direction  4. Decrease environmental stimuli, including noise as appropriate  5. Monitor and intervene to maintain adequate nutrition, hydration, elimination, sleep and activity  6. If unable to ensure safety without constant attention obtain sitter and review sitter guidelines with assigned personnel  7. Initiate Psychosocial CNS and Spiritual Care consult, as indicated  Outcome: Progressing     Problem: Behavior  Goal: Pt/Family maintain appropriate behavior and adhere to behavioral management agreement, if implemented  Description: INTERVENTIONS:  1. Assess patient/family's coping skills and  non-compliant behavior (including use of illegal substances)  2. Notify security of behavior or suspected illegal substances which indicate the need for search of the family and/or belongings  3. Encourage verbalization of thoughts and concerns in a socially appropriate manner  4. Utilize positive, consistent limit setting strategies supporting safety of patient, staff and others  5. Encourage participation in the decision making process about the behavioral management agreement  6. If a visitor's behavior poses a threat to safety call refer to organization policy.   7. Initiate consult with , Psychosocial CNS, Spiritual Care as appropriate  Outcome:

## 2023-07-12 NOTE — BH NOTE
Chief Complaint:  Suicidal ideation     Length of Stay: 25 Days    Interval History:  Ms. Andrzej Bullock is doing poorly. Says she barely slept last night and woke up feeling \"horrible\". Describes her mood as hopeless and has passive thoughts of dying. She remains isolative to her room and no energy or motivation. Calm and cooperative during the interview. Remains motivated to get started on ECT. At the present time the patient Laura Wade remains compliant with taking medications. Denies any adverse events from taking them and feels they have been beneficial.     6/19 - Laura Wade reports feeling good post ECT having had a headache treated with tylenol. No memory loss. Moods are good currently. Appropriately dressed and groomed. Denies SI/HI/AH/VH. No aggression or violence. Appropriately interactive and aware. Tolerating medications well. Eating and sleeping fairly. Wants to stay off of the Gabapentin. 6/20 - Laura Wade reports feeling good post ECT yesterday, but became restless as the day progressed. Moods are good. Appropriately dressed and groomed. Denies SI/HI/AH/VH. No aggression or violence. Appropriately interactive and aware. Tolerating medications well. Eating and sleeping poorly. 6/21 - Laura Wade reports feeling tired with a migraine post ECT # 2. Motrin helping a little but feels the headache is worse. No memory loss. Moods are depressed 7/10 post procedure. Appropriately dressed and groomed. Denies SI/HI/AH/VH. No aggression or violence. Appropriately interactive and aware. Tolerating medications well. Eating and sleeping fairly. Insight is fair and judgement is fair. 6/22 - Laura Wade reports feeling less spry than after the first ECT however notes improvements overall. Moods are fair. Appropriately dressed and groomed. Concerned about the headache she is getting and if it will be pretreated. Denies SI/HI/AH/VH.   No

## 2023-07-12 NOTE — GROUP NOTE
Group Therapy Note    Date: 7/12/2023    Group Start Time: 1500  Group End Time: 1600  Group Topic: Recreational    [address] 3 ACUTE BEHAV HLTH    [address]        Group Therapy Note    Attendees:        Patient's Goal:  To concentrate on selected task    Notes:  Pt did not attend session    Discipline Responsible: Recreational Therapist      Signature:  Lucila Iverson

## 2023-07-12 NOTE — ANESTHESIA POSTPROCEDURE EVALUATION
Department of Anesthesiology  Postprocedure Note    Patient: Rosales Taylor  MRN: [id number]  YOB: 1955  Date of evaluation: 7/12/2023      Procedure Summary     Date: 07/12/23 Room / Location: [facility] R2 / [facility] OR    Anesthesia Start: 0901 Anesthesia Stop: 3186    Procedure: ELECTROCONVULSIVE THERAPY (Head) Diagnosis:       Major depressive disorder, recurrent episode, moderate (HCC)      (Major depressive disorder, recurrent episode, moderate ([address]) [F33.1])    Surgeons: Forrest Rivera MD Responsible Provider: Stacey Hoffman MD    Anesthesia Type: general ASA Status: 2          Anesthesia Type: No value filed.     Virgen Phase I: Virgen Score: 10    Virgen Phase II: Virgen Score: 10      Anesthesia Post Evaluation    Patient location during evaluation: PACU  Level of consciousness: awake and alert  Airway patency: patent  Nausea & Vomiting: no vomiting and no nausea  Complications: no  Cardiovascular status: hemodynamically stable  Respiratory status: acceptable  Hydration status: stable

## 2023-07-12 NOTE — BH NOTE
Behavioral Health Treatment Team Note     Patient goal(s) for today: to keep myself busy until discharge  Treatment team focus/goals: continue ECT, collaborate with family, mobile crisis referral tomorrow    Progress note: Pt presents ao x 4. Pt presents calm and cooperative. Pt denies SI/HI/AVH at this time. Pt reports her mood is fair. Pt is discharge focused. SW was scheduled to have meeting with pt family today to discuss follow ups but due to scheduling conflicts this has been rescheduled for tomorrow at 1:30 pm. SW discussing referrals with pt family. Pt scheduled to discharge Friday after last ECT treatment (inpatient).     LOS:  25  Expected LOS: 27    Insurance info/prescription coverage:  MEDICARE PART A  Date of last family contact:  7/12/23  Family requesting physician contact today:  No  Discharge plan:  Return home after ECT  Guns in the home:  No  Outpatient provider(s):  [address], [address] Psychiatry     Participating treatment team members: LUIS Robertson

## 2023-07-12 NOTE — PROGRESS NOTES
GROUP THERAPY PROGRESS NOTE      Laura Wade was not present for medication group.       Thank you,    Abdon Camacho, PharmD, BCPS, [facility], [address] [place] [address]  Desk: [phone] ([id number])  Pharmacy: [phone] ([id number])

## 2023-07-12 NOTE — PERIOP NOTE
Report given to Behavioral health unit nurse Judd. Updated on vitals LoC - all w/in normal limits. Reported TORADOL given in procedure. Pt urinated  prior to return to unit. Pt book returned . Pt taken upstairs via wheelchair by ms. Mary.

## 2023-07-12 NOTE — CARE COORDINATION
07/12/23 1210   ITP   Date of Next Review 07/19/23   Short Term Goal 1   STG Goal 1 Status: Patient Appears to be  Progressing toward treatment plan goal   Short Term Goal 2   STG Goal 2 Status: Patient Appears to be  Progressing toward treatment plan goal     LUIS Kumar

## 2023-07-12 NOTE — PERIOP NOTE
Spoke with Gregory Hess, to advise patient scheduled for ECT procedure 7/14/202, NPO after midnight, arrive PACU 0730.

## 2023-07-12 NOTE — PROGRESS NOTES
Assumed care of pt, received report from [address] [place] [address]. Pt received after 0930 due to having ECT. Upon return from ECT, pt was calm and cooperative with care and med compliant. Pt denies SI/HI and AVH. Pt's affect if flat, her mood is bright. Pt awaiting visit from friend. Pt was cooperative throughout the day. No present issues. Q15 rounds in place, plan of care to continue. Pt's friend came to visit, pt received new book and newspaper.

## 2023-07-13 PROCEDURE — 6370000000 HC RX 637 (ALT 250 FOR IP): Performed by: PSYCHIATRY & NEUROLOGY

## 2023-07-13 PROCEDURE — 1240000000 HC EMOTIONAL WELLNESS R&B

## 2023-07-13 RX ORDER — MIRTAZAPINE 30 MG/1
30 TABLET, FILM COATED ORAL NIGHTLY
Qty: 30 TABLET | Refills: 1 | Status: SHIPPED | OUTPATIENT
Start: 2023-07-13

## 2023-07-13 RX ORDER — LISINOPRIL 30 MG/1
30 TABLET ORAL DAILY
Qty: 30 TABLET | Refills: 1 | Status: SHIPPED | OUTPATIENT
Start: 2023-07-13

## 2023-07-13 RX ORDER — LURASIDONE HYDROCHLORIDE 40 MG/1
40 TABLET, FILM COATED ORAL
Qty: 30 TABLET | Refills: 1 | Status: SHIPPED | OUTPATIENT
Start: 2023-07-13

## 2023-07-13 RX ORDER — LANOLIN ALCOHOL/MO/W.PET/CERES
9 CREAM (GRAM) TOPICAL NIGHTLY PRN
Qty: 30 TABLET | Refills: 1 | Status: SHIPPED | OUTPATIENT
Start: 2023-07-13

## 2023-07-13 RX ADMIN — MIRTAZAPINE 30 MG: 15 TABLET, FILM COATED ORAL at 21:57

## 2023-07-13 RX ADMIN — PREDNISONE 60 MG: 20 TABLET ORAL at 09:04

## 2023-07-13 RX ADMIN — LISINOPRIL 30 MG: 5 TABLET ORAL at 09:03

## 2023-07-13 RX ADMIN — FOLIC ACID 3 MG: 1 TABLET ORAL at 09:03

## 2023-07-13 RX ADMIN — Medication 9 MG: at 21:57

## 2023-07-13 RX ADMIN — LEUCOVORIN CALCIUM 5 MG: 5 TABLET ORAL at 09:04

## 2023-07-13 RX ADMIN — SENNOSIDES AND DOCUSATE SODIUM 2 TABLET: 50; 8.6 TABLET ORAL at 09:09

## 2023-07-13 RX ADMIN — LURASIDONE HYDROCHLORIDE 40 MG: 40 TABLET, FILM COATED ORAL at 16:55

## 2023-07-13 NOTE — PROGRESS NOTES
Pt was noted in her room playing cards. She presented alert and oriented x4, broad affect, mood euthymic. She denied SI/HI/AVH. She was compliant with her scheduled meds. She also received Melatonin for sleep. Pt appeared to have slept approx 8 hours during the night. No distress observed. Monitoring for safety and behaviors continues.

## 2023-07-13 NOTE — BH NOTE
Behavioral Health Treatment Team Note     Patient goal(s) for today: to get ready for discharge  Treatment team focus/goals: continue to monitor medication, prepare for discharge    Progress note: Pt presents ao x 4. Pt motivated and discharge focused today. Pt denies SI/HI/AVH. Pt denies feelings of depression. Pt med and meal compliant. Per nursing pt slept for 8 hours. Pt and SW has meeting with pt family today to discuss follow up care. SW has put in follow ups. SW has set up pt with mobile crisis stabilization services. SW providing education on these services today to pt family. Pt discharging tomorrow. Referral info below. LOS:  26  Expected LOS: 27    Insurance info/prescription coverage:  MEDICARE PART A  Date of last family contact:  7/13/23  Family requesting physician contact today:  No  Discharge plan:  Return home after ECT  Guns in the home:  No  Outpatient provider(s):  Hari Ames [facility], [address] [place] [address] Psychiatry     Participating treatment team members: LUIS Vigil     Referral/Collaboration Info:    8:31 am: SW called [facility] to inquire about mobile crisis services. SW provided pt demographics, Gabriel Mercado stated they were going to transfer SW call to [facility] but their line was busy. Gabriel Mercado took SW number and will be calling SW back. 8:40 am: SW received call back from Gabriel Mercado. Their mobile crisis program will follow the patient for 30-40 days after discharge or until patient has adequate outside resources and supports. SW having meeting with family today to discuss these follow ups.

## 2023-07-13 NOTE — BH NOTE
Group Therapy Note    Date: 7/13/2023  Start Time: 10:30 am  End Time:  11:15 am      Type of Group: Psycho-education    Topic: Strength's Exploration/ Positive Psychology      This writer conducted education group to help participants identify their own positive traits and accomplishments. Many of the participants were able to discuss things that they continue to worry about along with things that they feel like they have accomplished. This writer provided  an illustrated take-home reminder of some exercises to increase well-being. Group members were able to remain engaged and supportive throughout group.      Attendance: PATIENT DID NOT ATTEND GROUP         Lamberto Manriquez, supervisee in social work

## 2023-07-13 NOTE — GROUP NOTE
Group Therapy Note    Date: 7/13/2023    Group Start Time: 1500  Group End Time: 1600  Group Topic: Recreational    [facility] [address] ACUTE BEHAV HLTH    [address]        Group Therapy Note    Attendees:        Patient's Goal:  To concentrate on selected task    Notes:  Pt did not attend session      Discipline Responsible: Recreational Therapist      Signature:  Marvin Coe

## 2023-07-13 NOTE — GROUP NOTE
Group Therapy Note    Date: 7/13/2023    Group Start Time: 0900  Group End Time: 1000  Group Topic: Topic Group    [address] 3 ACUTE BEHAV [facility]        Group Therapy Note    Attendees: 6       Patient's Goal:  To participate in mental health Enevo game    Notes:  Pt did not attend session    Discipline Responsible: Recreational Therapist      Signature:  Delphine Miller

## 2023-07-13 NOTE — PROGRESS NOTES
Patient received sitting in her room. Denies SI/HI/AVH, depression and anxiety. Calm, cooperative, isolative to her room. Taking medications as prescribed. Will continue to monitor for safety.

## 2023-07-13 NOTE — PLAN OF CARE
Problem: Anxiety  Goal: Will report anxiety at manageable levels  Description: INTERVENTIONS:  1. Administer medication as ordered  2. Teach and rehearse alternative coping skills  3. Provide emotional support with 1:1 interaction with staff  Outcome: Progressing     Problem: Behavior  Goal: Pt/Family maintain appropriate behavior and adhere to behavioral management agreement, if implemented  Description: INTERVENTIONS:  1. Assess patient/family's coping skills and  non-compliant behavior (including use of illegal substances)  2. Notify security of behavior or suspected illegal substances which indicate the need for search of the family and/or belongings  3. Encourage verbalization of thoughts and concerns in a socially appropriate manner  4. Utilize positive, consistent limit setting strategies supporting safety of patient, staff and others  5. Encourage participation in the decision making process about the behavioral management agreement  6. If a visitor's behavior poses a threat to safety call refer to organization policy. 7. Initiate consult with , Psychosocial CNS, Spiritual Care as appropriate  Outcome: Progressing     Problem: Depression/Self Harm  Goal: Effect of psychiatric condition will be minimized and patient will be protected from self harm  Description: INTERVENTIONS:  1. Assess impact of patient's symptoms on level of functioning, self care needs and offer support as indicated  2. Assess patient/family knowledge of depression, impact on illness and need for teaching  3. Provide emotional support, presence and reassurance  4. Assess for possible suicidal thoughts or ideation. If patient expresses suicidal thoughts or statements do not leave alone, initiate Suicide Precautions, move to a room close to the nursing station and obtain sitter  5.  Initiate consults as appropriate with Mental Health Professional, Spiritual Care, Psychosocial CNS, and consider a recommendation to the LIP for a Psychiatric Consultation  Outcome: Progressing

## 2023-07-14 ENCOUNTER — ANESTHESIA (OUTPATIENT)
Facility: HOSPITAL | Age: 68
DRG: 885 | End: 2023-07-14
Payer: MEDICARE

## 2023-07-14 ENCOUNTER — ANESTHESIA EVENT (OUTPATIENT)
Facility: HOSPITAL | Age: 68
End: 2023-07-14
Payer: MEDICARE

## 2023-07-14 VITALS
WEIGHT: 115 LBS | OXYGEN SATURATION: 98 % | HEART RATE: 68 BPM | DIASTOLIC BLOOD PRESSURE: 97 MMHG | RESPIRATION RATE: 10 BRPM | TEMPERATURE: 98.1 F | SYSTOLIC BLOOD PRESSURE: 162 MMHG | HEIGHT: 67 IN | BODY MASS INDEX: 18.05 KG/M2

## 2023-07-14 PROCEDURE — 7100000001 HC PACU RECOVERY - ADDTL 15 MIN: Performed by: PSYCHIATRY & NEUROLOGY

## 2023-07-14 PROCEDURE — 2709999900 HC NON-CHARGEABLE SUPPLY: Performed by: PSYCHIATRY & NEUROLOGY

## 2023-07-14 PROCEDURE — 6360000002 HC RX W HCPCS: Performed by: ANESTHESIOLOGY

## 2023-07-14 PROCEDURE — 6370000000 HC RX 637 (ALT 250 FOR IP): Performed by: PSYCHIATRY & NEUROLOGY

## 2023-07-14 PROCEDURE — 7100000000 HC PACU RECOVERY - FIRST 15 MIN: Performed by: PSYCHIATRY & NEUROLOGY

## 2023-07-14 PROCEDURE — 2500000003 HC RX 250 WO HCPCS: Performed by: ANESTHESIOLOGY

## 2023-07-14 PROCEDURE — 3700000000 HC ANESTHESIA ATTENDED CARE: Performed by: PSYCHIATRY & NEUROLOGY

## 2023-07-14 PROCEDURE — 90870 ELECTROCONVULSIVE THERAPY: CPT | Performed by: PSYCHIATRY & NEUROLOGY

## 2023-07-14 RX ORDER — SODIUM CHLORIDE 0.9 % (FLUSH) 0.9 %
5-40 SYRINGE (ML) INJECTION PRN
Status: DISCONTINUED | OUTPATIENT
Start: 2023-07-14 | End: 2023-07-14 | Stop reason: HOSPADM

## 2023-07-14 RX ORDER — SODIUM CHLORIDE 0.9 % (FLUSH) 0.9 %
5-40 SYRINGE (ML) INJECTION EVERY 12 HOURS SCHEDULED
Status: DISCONTINUED | OUTPATIENT
Start: 2023-07-14 | End: 2023-07-14 | Stop reason: HOSPADM

## 2023-07-14 RX ORDER — SODIUM CHLORIDE 0.9 % (FLUSH) 0.9 %
5-40 SYRINGE (ML) INJECTION EVERY 12 HOURS SCHEDULED
Status: DISCONTINUED | OUTPATIENT
Start: 2023-07-14 | End: 2023-07-14

## 2023-07-14 RX ORDER — SODIUM CHLORIDE 9 MG/ML
INJECTION, SOLUTION INTRAVENOUS CONTINUOUS
Status: DISCONTINUED | OUTPATIENT
Start: 2023-07-14 | End: 2023-07-14

## 2023-07-14 RX ORDER — SODIUM CHLORIDE 9 MG/ML
INJECTION, SOLUTION INTRAVENOUS PRN
Status: DISCONTINUED | OUTPATIENT
Start: 2023-07-14 | End: 2023-07-14 | Stop reason: HOSPADM

## 2023-07-14 RX ORDER — SUCCINYLCHOLINE/SOD CL,ISO/PF 100 MG/5ML
SYRINGE (ML) INTRAVENOUS PRN
Status: DISCONTINUED | OUTPATIENT
Start: 2023-07-14 | End: 2023-07-14 | Stop reason: SDUPTHER

## 2023-07-14 RX ORDER — SODIUM CHLORIDE 0.9 % (FLUSH) 0.9 %
5-40 SYRINGE (ML) INJECTION PRN
Status: DISCONTINUED | OUTPATIENT
Start: 2023-07-14 | End: 2023-07-14

## 2023-07-14 RX ORDER — SODIUM CHLORIDE 9 MG/ML
INJECTION, SOLUTION INTRAVENOUS CONTINUOUS
Status: DISCONTINUED | OUTPATIENT
Start: 2023-07-14 | End: 2023-07-14 | Stop reason: HOSPADM

## 2023-07-14 RX ORDER — SODIUM CHLORIDE 9 MG/ML
INJECTION, SOLUTION INTRAVENOUS PRN
Status: DISCONTINUED | OUTPATIENT
Start: 2023-07-14 | End: 2023-07-14

## 2023-07-14 RX ORDER — KETOROLAC TROMETHAMINE 30 MG/ML
INJECTION, SOLUTION INTRAMUSCULAR; INTRAVENOUS PRN
Status: DISCONTINUED | OUTPATIENT
Start: 2023-07-14 | End: 2023-07-14 | Stop reason: SDUPTHER

## 2023-07-14 RX ADMIN — Medication 40 MG: at 09:36

## 2023-07-14 RX ADMIN — LISINOPRIL 30 MG: 5 TABLET ORAL at 10:58

## 2023-07-14 RX ADMIN — Medication 30 MG: at 09:36

## 2023-07-14 RX ADMIN — FOLIC ACID 3 MG: 1 TABLET ORAL at 10:58

## 2023-07-14 RX ADMIN — KETOROLAC TROMETHAMINE 30 MG: 30 INJECTION INTRAMUSCULAR; INTRAVENOUS at 09:35

## 2023-07-14 ASSESSMENT — PAIN - FUNCTIONAL ASSESSMENT
PAIN_FUNCTIONAL_ASSESSMENT: ADULT NONVERBAL PAIN SCALE (NPVS)
PAIN_FUNCTIONAL_ASSESSMENT: NONE - DENIES PAIN

## 2023-07-14 NOTE — CARE COORDINATION
07/14/23 1023   Short Term Goal 1   STG Goal 1 Status: Patient Appears to be    (Resolved.)   Short Term Goal 2   STG Goal 2 Status: Patient Appears to be    (Resolved.)     LUIS Foley

## 2023-07-14 NOTE — PROCEDURES
ECT PROCEDURE NOTE      IDENTIFICATION:    Patient Name  Soledad Jennings   Date of Birth 1955   [facility] [id number]   Medical Record Number  [id number]      Age  78 y.o. PCP Aliza Anderson MD   Admit date:  6/17/2023    Room Number  OR/PL  @ [facility]   Date of Service  7/14/2023            Electro Convulsive Therapy:  Treatment number 8        Maintenance ECT - NO   Laura Wade was admitted to the PACU for ECT. Patient was medically cleared and NPO for procedure. Patient is not court mandated and gave informed consent for ECT treatment. Patient received     Bilateral ECT - YES    Administered using the [address] [place] [address]. at 40 % Energy, under general anesthesia. Laura Wade with a good, well generalized seizure of 28 seconds on observation of the motor manifestations of the seizure. EEG duration was NA secs. Post-Ictal Suppression Index: NA %  Recovery was unremarkable and with no complications. Change in Energy %:            Anesthesia medications administered during procedure:  Brevital:  40 mg  Change for next treament:     Succinylcholine: 30 mg  Change for next treatment:      Propofol: mg  Glycopyrrolate:  mg  Labetalol:  mg  Esmolol:  mg  Lorazepam:  mg  Ketamine:  mg  Zofran:   mg    Toradol: 30 mg  Lidocaine:  mg  Caffeine Benzoate: mg       No flowsheet data found.     SIGNED:    Moises Purvis MD  7/14/2023

## 2023-07-14 NOTE — PERIOP NOTE
TRANSFER - IN REPORT:    Verbal report received from Carmine Weber RN on [facility] [place]  being received from [facility] for ordered procedure      Report consisted of patient's Situation, Background, Assessment and   Recommendations(SBAR). Information from the following report(s) Nurse Handoff Report was reviewed with the receiving nurse. Opportunity for questions and clarification was provided. Assessment completed upon patient's arrival to unit and care assumed.

## 2023-07-14 NOTE — ANESTHESIA POSTPROCEDURE EVALUATION
Department of Anesthesiology  Postprocedure Note    Patient: Nikki Clark  MRN: [id number]  YOB: 1955  Date of evaluation: 7/14/2023      Procedure Summary     Date: 07/14/23 Room / Location: [facility] OR R2 / [facility] OR    Anesthesia Start: 0929 Anesthesia Stop: 4982    Procedure: ELECTROCONVULSIVE THERAPY (Head) Diagnosis:       Major depressive disorder, recurrent episode, moderate (HCC)      (Major depressive disorder, recurrent episode, moderate ([address]) [F33.1])    Surgeons: Nitin Orosco MD Responsible Provider: Cristela Anton MD    Anesthesia Type: general ASA Status: 2          Anesthesia Type: No value filed.     Virgen Phase I: Virgen Score: 10    Virgen Phase II: Virgen Score: 10      Anesthesia Post Evaluation    Patient location during evaluation: PACU  Patient participation: complete - patient participated  Level of consciousness: awake and alert  Airway patency: patent  Nausea & Vomiting: no vomiting and no nausea  Cardiovascular status: hemodynamically stable  Respiratory status: acceptable  Hydration status: stable

## 2023-07-14 NOTE — BH NOTE
Behavioral Health Transition Record to Provider    Patient Name: Sepideh Ortega  YOB: 1955  Medical Record Number: [id number]  Date of Admission: 6/17/2023  Date of Discharge: 7/14/2023    Attending Provider: Surjit Cox MD  Discharging Provider: RICHARD Richardson  To contact this individual call [phone] and ask the  to page. If unavailable, ask to be transferred to [facility] Provider on call. [facility] Provider will be available on call 24/7 and during holidays. Primary Care Provider: Jasvir Shay MD    Allergies   Allergen Reactions    Codeine Dizziness or Vertigo    Fructose Diarrhea    Lac Bovis Other (See Comments)     Lactose  - stomach intolerance         Reason for Admission:  Patient is a 78year old female with a hx of Bipolar disorder who was admitted to [facility] [place] last month and came to [facility] to  ECT. Patient was sent home with medications but continued to decompensate in the community. Patient presents as a poor historian and needs to be assessed by Neurology as well. Patient does not have capacity to care for self due to altered mental status. Patient recently moved and per chart review, patient has been isolative and some what catatonic. Patient is not able to keep self safe in the community at this time and will start ECT at [facility]. Admission Diagnosis: Depression [F32. A]    Procedure(s):  ELECTROCONVULSIVE THERAPY    Results for orders placed or performed during the hospital encounter of 06/17/23   CBC with Auto Differential   Result Value Ref Range    WBC 3.5 (L) 3.6 - 11.0 K/uL    RBC 3.37 (L) 3.80 - 5.20 M/uL    Hemoglobin 11.1 (L) 11.5 - 16.0 g/dL    Hematocrit 33.0 (L) 35.0 - 47.0 %    MCV 97.9 80.0 - 99.0 FL    MCH 32.9 26.0 - 34.0 PG    MCHC 33.6 30.0 - 36.5 g/dL    RDW 12.4 11.5 - 14.5 %    Platelets 717 733 - 208 K/uL    MPV 10.5 8.9 - 12.9 FL    Nucleated RBCs 0.0 0  WBC    nRBC 0.00 0.00 - 0.01 K/uL    Neutrophils %

## 2023-07-14 NOTE — PERIOP NOTE
[place]inda [place]  1955  [id number]    Situation:  Verbal report given from: Dr. Claudette Plenty and Jami Toscano RN  Procedure: Procedure(s):  ELECTROCONVULSIVE THERAPY    Background:    Preoperative diagnosis: Major depressive disorder, recurrent episode, moderate ([address]) [F33.1]    Postoperative diagnosis: * No post-op diagnosis entered *    :  Dr. Clyde Chew    Assistant(s): Circulator: Shayy Nation RN  Scrub Person First: Judge Cao    Specimens: * No specimens in log *    Assessment:  Intra-procedure medications         Anesthesia gave intra-procedure sedation and medications, see anesthesia flow sheet     Intravenous fluids: LR@ KVO     Vital signs stable

## 2023-07-14 NOTE — GROUP NOTE
Group Therapy Note    Date: 7/14/2023    Group Start Time: 0900  Group End Time: 1000  Group Topic: Topic Group    [address] ACUTE BEHAV [facility]        Group Therapy Note    Attendees:        Patient's Goal:  To participate in relaxation activity    Notes:  Pt did not attend session      Discipline Responsible: Recreational Therapist      Signature:  Abigail Hernandez

## 2023-07-14 NOTE — BH NOTE
Pt encountered in her room following ECT. She denies all Si/HI, AVH, depression and anxiety. Pt is calm and cooperative. Pleasant upon approach. Denies all pain and discomfort, remains both meal and med compliant. Monitoring willcontinue for changes in condition.

## 2023-07-14 NOTE — PERIOP NOTE
TRANSFER - OUT REPORT:    Verbal report given to Keith on Laura Wade  being transferred to [facility] for routine post-op       Report consisted of patient's Situation, Background, Assessment and   Recommendations(SBAR). Information from the following report(s) Nurse Handoff Report was reviewed with the receiving nurse. Lines:       Opportunity for questions and clarification was provided.       Patient transported with:  Registered Nurse

## 2023-07-14 NOTE — DISCHARGE SUMMARY
BEHAVIORAL HEALTH DISCHARGE SUMMARY      Patient: Jessika Braga MRN: [id number] SSN: [id number]   YOB: 1955 Age: 78 y.o. Sex: female     Date of Admission: 6/17/2023  Date of Discharge:7/14/2023     Type of Discharge:  REGULAR       Admission Data:     CHIEF COMPLAINT:  \"My depression is not getting any better. \"     HISTORY OF PRESENT ILLNESS:  The patient is a 59-year-old  female who is currently admitted after being transferred to this hospital from [facility].  She has had several hospitalizations there recently for depression that has not responded to multiple medications. She was transferred to us with the hope that she can get into the ECT program here. She reports that she has been depressed since the beginning of this year and thinks that this is when her mental illness started. She states that she had a what she describes as a manic episode in 02/2023, and following that she has gone into a depression from which she has struggled to improve. States that she is feeling hopeless, feels depressed almost everyday, struggles with her sleep and has little energy or motivation. Notes that she has been compliant with taking her medications and has tried multiple different antidepressants and augmenting agents recently. This has included taking Lexapro, mirtazapine, Prozac, Seroquel, Latuda, gabapentin as well as lorazepam.  We discussed the procedure of ECT, the adverse effects and benefits and she is agreeable to trying it. Plain CT workup has been mostly completed. There is a normal EKG and normal chest x-ray. She is agreeable to signing informed consent to have ECT which might be started on Monday or Wednesday. She still feels hopeless and depressed and has passive thoughts of suicide. Denies any psychotic symptoms. PAST MEDICAL HISTORY:  Reviewed, as per the history and physical exam.        Past Medical History   No past medical history on file.      Home Basophils % 06/25/2023 1  0 - 1 % Final    Immature Granulocytes 06/25/2023 0  0.0 - 0.5 % Final    Neutrophils Absolute 06/25/2023 1.8  1.8 - 8.0 K/UL Final    Lymphocytes Absolute 06/25/2023 1.2  0.8 - 3.5 K/UL Final    Monocytes Absolute 06/25/2023 0.4  0.0 - 1.0 K/UL Final    Eosinophils Absolute 06/25/2023 0.1  0.0 - 0.4 K/UL Final    Basophils Absolute 06/25/2023 0.1  0.0 - 0.1 K/UL Final    Absolute Immature Granulocyte 06/25/2023 0.0  0.00 - 0.04 K/UL Final    Differential Type 06/25/2023 AUTOMATED    Final    Sodium 06/25/2023 143  136 - 145 mmol/L Final    Potassium 06/25/2023 4.0  3.5 - 5.1 mmol/L Final    Chloride 06/25/2023 108  97 - 108 mmol/L Final    CO2 06/25/2023 30  21 - 32 mmol/L Final    Anion Gap 06/25/2023 5  5 - 15 mmol/L Final    Glucose 06/25/2023 87  65 - 100 mg/dL Final    BUN 06/25/2023 25 (H)  6 - 20 MG/DL Final    Creatinine 06/25/2023 0.85  0.55 - 1.02 MG/DL Final    Bun/Cre Ratio 06/25/2023 29 (H)  12 - 20   Final    Est, Glom Filt Rate 06/25/2023 >60  >60 ml/min/1.73m2 Final    Calcium 06/25/2023 8.0 (L)  8.5 - 10.1 MG/DL Final    Total Bilirubin 06/25/2023 0.7  0.2 - 1.0 MG/DL Final    ALT 06/25/2023 20  12 - 78 U/L Final    AST 06/25/2023 13 (L)  15 - 37 U/L Final    Alk Phosphatase 06/25/2023 52  45 - 117 U/L Final    Total Protein 06/25/2023 5.4 (L)  6.4 - 8.2 g/dL Final    Albumin 06/25/2023 2.7 (L)  3.5 - 5.0 g/dL Final    Globulin 06/25/2023 2.7  2.0 - 4.0 g/dL Final    Albumin/Globulin Ratio 06/25/2023 1.0 (L)  1.1 - 2.2   Final          Discharge Diagnosis:   Bipolar I disorder, most recent episode depressed, treatment resistant.          Medication List        START taking these medications      melatonin 3 MG Tabs tablet  Take 3 tablets by mouth nightly as needed (insomnia)            CHANGE how you take these medications      lurasidone 40 MG Tabs tablet  Commonly known as: LATUDA  Take 1 tablet by mouth Daily with supper  What changed:   medication strength  how much to

## 2023-07-19 ENCOUNTER — ANESTHESIA (OUTPATIENT)
Facility: HOSPITAL | Age: 68
End: 2023-07-19
Payer: MEDICARE

## 2023-07-19 ENCOUNTER — HOSPITAL ENCOUNTER (OUTPATIENT)
Facility: HOSPITAL | Age: 68
Setting detail: OUTPATIENT SURGERY
Discharge: HOME | End: 2023-07-19
Attending: PSYCHIATRY & NEUROLOGY | Admitting: PSYCHIATRY & NEUROLOGY
Payer: MEDICARE

## 2023-07-19 VITALS
RESPIRATION RATE: 20 BRPM | HEART RATE: 75 BPM | BODY MASS INDEX: 18.05 KG/M2 | OXYGEN SATURATION: 98 % | DIASTOLIC BLOOD PRESSURE: 88 MMHG | WEIGHT: 115 LBS | TEMPERATURE: 98.4 F | HEIGHT: 67 IN | SYSTOLIC BLOOD PRESSURE: 139 MMHG

## 2023-07-19 PROCEDURE — 3700000000 HC ANESTHESIA ATTENDED CARE: Performed by: PSYCHIATRY & NEUROLOGY

## 2023-07-19 PROCEDURE — 7100000000 HC PACU RECOVERY - FIRST 15 MIN: Performed by: PSYCHIATRY & NEUROLOGY

## 2023-07-19 PROCEDURE — 7100000011 HC PHASE II RECOVERY - ADDTL 15 MIN: Performed by: PSYCHIATRY & NEUROLOGY

## 2023-07-19 PROCEDURE — 6360000002 HC RX W HCPCS: Performed by: ANESTHESIOLOGY

## 2023-07-19 PROCEDURE — 7100000010 HC PHASE II RECOVERY - FIRST 15 MIN: Performed by: PSYCHIATRY & NEUROLOGY

## 2023-07-19 PROCEDURE — 2709999900 HC NON-CHARGEABLE SUPPLY: Performed by: PSYCHIATRY & NEUROLOGY

## 2023-07-19 PROCEDURE — 90870 ELECTROCONVULSIVE THERAPY: CPT | Performed by: PSYCHIATRY & NEUROLOGY

## 2023-07-19 PROCEDURE — 2500000003 HC RX 250 WO HCPCS: Performed by: ANESTHESIOLOGY

## 2023-07-19 RX ORDER — SODIUM CHLORIDE 0.9 % (FLUSH) 0.9 %
5-40 SYRINGE (ML) INJECTION PRN
Status: DISCONTINUED | OUTPATIENT
Start: 2023-07-19 | End: 2023-07-19 | Stop reason: HOSPADM

## 2023-07-19 RX ORDER — SODIUM CHLORIDE 0.9 % (FLUSH) 0.9 %
5-40 SYRINGE (ML) INJECTION EVERY 12 HOURS SCHEDULED
Status: DISCONTINUED | OUTPATIENT
Start: 2023-07-19 | End: 2023-07-19 | Stop reason: HOSPADM

## 2023-07-19 RX ORDER — KETOROLAC TROMETHAMINE 30 MG/ML
INJECTION, SOLUTION INTRAMUSCULAR; INTRAVENOUS PRN
Status: DISCONTINUED | OUTPATIENT
Start: 2023-07-19 | End: 2023-07-19 | Stop reason: SDUPTHER

## 2023-07-19 RX ORDER — SUCCINYLCHOLINE/SOD CL,ISO/PF 100 MG/5ML
SYRINGE (ML) INTRAVENOUS PRN
Status: DISCONTINUED | OUTPATIENT
Start: 2023-07-19 | End: 2023-07-19 | Stop reason: SDUPTHER

## 2023-07-19 RX ORDER — SODIUM CHLORIDE 9 MG/ML
INJECTION, SOLUTION INTRAVENOUS CONTINUOUS
Status: DISCONTINUED | OUTPATIENT
Start: 2023-07-19 | End: 2023-07-19 | Stop reason: HOSPADM

## 2023-07-19 RX ORDER — SODIUM CHLORIDE 9 MG/ML
INJECTION, SOLUTION INTRAVENOUS PRN
Status: DISCONTINUED | OUTPATIENT
Start: 2023-07-19 | End: 2023-07-19 | Stop reason: HOSPADM

## 2023-07-19 RX ADMIN — Medication 40 MG: at 07:31

## 2023-07-19 RX ADMIN — KETOROLAC TROMETHAMINE 30 MG: 30 INJECTION INTRAMUSCULAR; INTRAVENOUS at 07:28

## 2023-07-19 RX ADMIN — Medication 30 MG: at 07:31

## 2023-07-19 ASSESSMENT — PAIN - FUNCTIONAL ASSESSMENT
PAIN_FUNCTIONAL_ASSESSMENT: ADULT NONVERBAL PAIN SCALE (NPVS)
PAIN_FUNCTIONAL_ASSESSMENT: NONE - DENIES PAIN
PAIN_FUNCTIONAL_ASSESSMENT: ADULT NONVERBAL PAIN SCALE (NPVS)
PAIN_FUNCTIONAL_ASSESSMENT: NONE - DENIES PAIN
PAIN_FUNCTIONAL_ASSESSMENT: NONE - DENIES PAIN
PAIN_FUNCTIONAL_ASSESSMENT: ADULT NONVERBAL PAIN SCALE (NPVS)

## 2023-07-19 NOTE — PROCEDURES
ECT PROCEDURE NOTE      IDENTIFICATION:    Patient Name  Casa Leggett   Date of Birth 1955   [facility] [id number]   Medical Record Number  [id number]      Age  78 y.o. PCP Juliet Mednoza MD   Admit date:  7/19/2023    Room Number  OR/PL  @ [facility]   Date of Service  7/19/2023            Electro Convulsive Therapy:  Treatment number 9        Maintenance ECT - YES   Laura Wade was admitted to the PACU for ECT. Patient was medically cleared and NPO for procedure. Patient is not court mandated and gave informed consent for ECT treatment. Patient received     Bilateral ECT - YES    Administered using the [address] [place] [address]. at 40 % Energy, under general anesthesia. Laura Wade with a good, well generalized seizure of 32 seconds on observation of the motor manifestations of the seizure. EEG duration was NA secs. Post-Ictal Suppression Index: NA %  Recovery was unremarkable and with no complications. Change in Energy %:            Anesthesia medications administered during procedure:  Brevital:  40 mg  Change for next treament:     Succinylcholine: 30 mg  Change for next treatment:      Propofol: mg  Glycopyrrolate:  mg  Labetalol:  mg  Esmolol:  mg  Lorazepam:  mg  Ketamine:  mg  Zofran:   mg    Toradol: 30 mg  Lidocaine:  mg  Caffeine Benzoate: mg       No flowsheet data found.     SIGNED:    Graciela Bailey MD  7/19/2023

## 2023-07-19 NOTE — ANESTHESIA POSTPROCEDURE EVALUATION
Department of Anesthesiology  Postprocedure Note    Patient: Ashkan Escudero  MRN: [id number]  YOB: 1955  Date of evaluation: 7/19/2023      Procedure Summary     Date: 07/19/23 Room / Location: [facility] R2 / [facility] OR    Anesthesia Start: 0726 Anesthesia Stop: 2757    Procedure: ELECTROCONVULSIVE THERAPY (Head) Diagnosis:       Major depressive disorder, recurrent episode, moderate (HCC)      (Major depressive disorder, recurrent episode, moderate ([address]) [F33.1])    Surgeons: Delores Roman MD Responsible Provider: Betty Mckenna MD    Anesthesia Type: general ASA Status: 2          Anesthesia Type: No value filed.     Virgen Phase I: Virgen Score: 10    Virgen Phase II:        Anesthesia Post Evaluation    Patient location during evaluation: PACU  Patient participation: complete - patient participated  Level of consciousness: awake and alert  Airway patency: patent  Nausea & Vomiting: no vomiting and no nausea  Complications: no  Cardiovascular status: hemodynamically stable  Respiratory status: acceptable  Hydration status: stable

## 2023-07-19 NOTE — PERIOP NOTE
Roselle Merlin  1955  [id number]    Situation:  Verbal report given from: French Llamas RN  Procedure: Procedure(s):  ELECTROCONVULSIVE THERAPY    Background:    Preoperative diagnosis: Major depressive disorder, recurrent episode, moderate (HCC) [F33.1]    Postoperative diagnosis: * No post-op diagnosis entered *    :  Dr. Farideh Phan     Assistant(s): Circulator: Annika Maria RN  Scrub Person First: [place]    Specimens: * No specimens in log *    Assessment:  Intra-procedure medications         Anesthesia gave intra-procedure sedation and medications, see anesthesia flow sheet     Intravenous fluids: LR@ KVO     Vital signs stable

## 2023-07-19 NOTE — DISCHARGE INSTRUCTIONS
You received 30 mg of IV Toradol during your procedure today at 07:30 AM. This medication is similar to Motrin/ibuprofen. Please do not take any additional Motrin/ibuprofen for 6-8 hours, or no sooner than 1:30 pm.                               ECT DISCHARGE INSTRUCTIONS      NAME: Linda Martin   :  1955   MRN:  [id number]     Date/Time:  2023 7:37 AM    ADMIT DATE: 2023     DISCHARGE DATE: 2023     DISCHARGE DIAGNOSIS:  @[facility]@     Recommended diet:  regular diet    Recommended activity: activity as tolerated and no driving for today    Have a responsible person stay with the patient for 24 hours after the treatment, some temporary confusion may be noticed. Do not allow a patient to operate a motor vehicle for at least 24 hours and all the confusion has cleared. Do not drink alcoholic beverages for 48 hours past treatment. Do not sign any legal documents. Do not make any critical decisions for 24 hours. Advance diet as tolerated. Start with liquids and advance it nausea is not present. Understand that memory for recent events, phone numbers, addresses, ect. May be decreased for a short period of time. Report any persistant nausea or pain to the treating physician. Patient receiving ECT while in the hospital should also not attempt to ambulate without assistance, please use tap bell which will be provide. If you have questions regarding the hospital related prescriptions or hospital related issues please call the [address] [place] [address] at [facility] [phone]. If you experience any of the following symptoms then please call your primary care physician/outpatient psychiatrist or return to the emergency room if you cannot get hold of your doctor: Fever, chills, nausea, vomiting, diarrhea, change in mentation, falling, bleeding, shortness of breath.     Follow Up:  Continue outpatient psychiatric follow-up visits with personal

## 2023-07-21 ENCOUNTER — ANESTHESIA EVENT (OUTPATIENT)
Facility: HOSPITAL | Age: 68
End: 2023-07-21
Payer: MEDICARE

## 2023-07-26 ENCOUNTER — ANESTHESIA (OUTPATIENT)
Facility: HOSPITAL | Age: 68
End: 2023-07-26
Payer: MEDICARE

## 2023-07-26 ENCOUNTER — HOSPITAL ENCOUNTER (OUTPATIENT)
Facility: HOSPITAL | Age: 68
Setting detail: OUTPATIENT SURGERY
Discharge: HOME | End: 2023-07-26
Attending: PSYCHIATRY & NEUROLOGY | Admitting: PSYCHIATRY & NEUROLOGY
Payer: MEDICARE

## 2023-07-26 VITALS
TEMPERATURE: 97.8 F | RESPIRATION RATE: 17 BRPM | HEIGHT: 67 IN | BODY MASS INDEX: 18.05 KG/M2 | HEART RATE: 72 BPM | SYSTOLIC BLOOD PRESSURE: 144 MMHG | WEIGHT: 115 LBS | OXYGEN SATURATION: 98 % | DIASTOLIC BLOOD PRESSURE: 89 MMHG

## 2023-07-26 PROCEDURE — 6360000002 HC RX W HCPCS: Performed by: ANESTHESIOLOGY

## 2023-07-26 PROCEDURE — 3700000000 HC ANESTHESIA ATTENDED CARE: Performed by: PSYCHIATRY & NEUROLOGY

## 2023-07-26 PROCEDURE — 7100000010 HC PHASE II RECOVERY - FIRST 15 MIN: Performed by: PSYCHIATRY & NEUROLOGY

## 2023-07-26 PROCEDURE — 2709999900 HC NON-CHARGEABLE SUPPLY: Performed by: PSYCHIATRY & NEUROLOGY

## 2023-07-26 PROCEDURE — 2500000003 HC RX 250 WO HCPCS: Performed by: ANESTHESIOLOGY

## 2023-07-26 PROCEDURE — 90870 ELECTROCONVULSIVE THERAPY: CPT | Performed by: PSYCHIATRY & NEUROLOGY

## 2023-07-26 PROCEDURE — 7100000000 HC PACU RECOVERY - FIRST 15 MIN: Performed by: PSYCHIATRY & NEUROLOGY

## 2023-07-26 RX ORDER — SODIUM CHLORIDE 0.9 % (FLUSH) 0.9 %
5-40 SYRINGE (ML) INJECTION EVERY 12 HOURS SCHEDULED
Status: DISCONTINUED | OUTPATIENT
Start: 2023-07-26 | End: 2023-07-26 | Stop reason: HOSPADM

## 2023-07-26 RX ORDER — SODIUM CHLORIDE 9 MG/ML
INJECTION, SOLUTION INTRAVENOUS CONTINUOUS
Status: DISCONTINUED | OUTPATIENT
Start: 2023-07-26 | End: 2023-07-26 | Stop reason: HOSPADM

## 2023-07-26 RX ORDER — SODIUM CHLORIDE 0.9 % (FLUSH) 0.9 %
5-40 SYRINGE (ML) INJECTION PRN
Status: DISCONTINUED | OUTPATIENT
Start: 2023-07-26 | End: 2023-07-26 | Stop reason: HOSPADM

## 2023-07-26 RX ORDER — SODIUM CHLORIDE 9 MG/ML
INJECTION, SOLUTION INTRAVENOUS PRN
Status: DISCONTINUED | OUTPATIENT
Start: 2023-07-26 | End: 2023-07-26 | Stop reason: HOSPADM

## 2023-07-26 RX ORDER — SUCCINYLCHOLINE/SOD CL,ISO/PF 100 MG/5ML
SYRINGE (ML) INTRAVENOUS PRN
Status: DISCONTINUED | OUTPATIENT
Start: 2023-07-26 | End: 2023-07-26 | Stop reason: SDUPTHER

## 2023-07-26 RX ORDER — KETOROLAC TROMETHAMINE 30 MG/ML
INJECTION, SOLUTION INTRAMUSCULAR; INTRAVENOUS PRN
Status: DISCONTINUED | OUTPATIENT
Start: 2023-07-26 | End: 2023-07-26 | Stop reason: SDUPTHER

## 2023-07-26 RX ADMIN — Medication 30 MG: at 08:09

## 2023-07-26 RX ADMIN — Medication 40 MG: at 08:09

## 2023-07-26 RX ADMIN — KETOROLAC TROMETHAMINE 30 MG: 30 INJECTION INTRAMUSCULAR; INTRAVENOUS at 08:08

## 2023-07-26 NOTE — ANESTHESIA PRE PROCEDURE
Never                                Counseling given: Not Answered      Vital Signs (Current): There were no vitals filed for this visit. BP Readings from Last 3 Encounters:   07/19/23 139/88   02/17/23 113/80       NPO Status:                                                                                 BMI:   Wt Readings from Last 3 Encounters:   07/26/23 52.2 kg (115 lb)   07/19/23 52.2 kg (115 lb)   02/14/23 53.1 kg (117 lb)     There is no height or weight on file to calculate BMI.    CBC:   Lab Results   Component Value Date/Time    WBC 3.5 06/25/2023 05:15 AM    RBC 3.37 06/25/2023 05:15 AM    HGB 11.1 06/25/2023 05:15 AM    HCT 33.0 06/25/2023 05:15 AM    MCV 97.9 06/25/2023 05:15 AM    RDW 12.4 06/25/2023 05:15 AM     06/25/2023 05:15 AM       CMP:   Lab Results   Component Value Date/Time     06/25/2023 05:15 AM    K 4.0 06/25/2023 05:15 AM     06/25/2023 05:15 AM    CO2 30 06/25/2023 05:15 AM    BUN 25 06/25/2023 05:15 AM    CREATININE 0.85 06/25/2023 05:15 AM    AGRATIO 1.1 02/14/2023 04:51 AM    LABGLOM >60 06/25/2023 05:15 AM    GLUCOSE 87 06/25/2023 05:15 AM    PROT 5.4 06/25/2023 05:15 AM    CALCIUM 8.0 06/25/2023 05:15 AM    BILITOT 0.7 06/25/2023 05:15 AM    ALKPHOS 52 06/25/2023 05:15 AM    ALKPHOS 54 02/14/2023 04:51 AM    AST 13 06/25/2023 05:15 AM    ALT 20 06/25/2023 05:15 AM       POC Tests: No results for input(s): POCGLU, POCNA, POCK, POCCL, POCBUN, POCHEMO, POCHCT in the last 72 hours.     Coags:   Lab Results   Component Value Date/Time    PROTIME 10.7 02/12/2023 08:17 PM    INR 1.0 02/12/2023 08:17 PM       HCG (If Applicable): No results found for: PREGTESTUR, PREGSERUM, HCG, HCGQUANT     ABGs: No results found for: PHART, PO2ART, LYQ4YLC, MGQ6RVC, BEART, I0TVUPLO     Type & Screen (If Applicable):  No results found for: LABABO, LABRH    Drug/Infectious Status (If Applicable):  No results found for: HIV, HEPCAB    COVID-19

## 2023-07-26 NOTE — PROCEDURES
ECT PROCEDURE NOTE      IDENTIFICATION:    Patient Name  Jc Ernst   Date of Birth 1955   [facility] [id number]   Medical Record Number  [id number]      Age  78 y.o. PCP Alessandro Smith MD   Admit date:  7/26/2023    Room Number  OR/PL  @ [facility]   Date of Service  7/26/2023            Electro Convulsive Therapy:  Treatment number 10        Maintenance ECT - YES   Laura Wade was admitted to the PACU for ECT. Patient was medically cleared and NPO for procedure. Patient is not court mandated and gave informed consent for ECT treatment. Patient received     Bilateral ECT - YES    Administered using the [address] [place] [address]. at 40 % Energy, under general anesthesia. Laura Wade with a good, well generalized seizure of 30 seconds on observation of the motor manifestations of the seizure. EEG duration was NA secs. Post-Ictal Suppression Index: NA %  Recovery was unremarkable and with no complications. Change in Energy %:            Anesthesia medications administered during procedure:  Brevital:  40 mg  Change for next treament:     Succinylcholine: 30 mg  Change for next treatment:      Propofol: mg  Glycopyrrolate:  mg  Labetalol:  mg  Esmolol:  mg  Lorazepam:  mg  Ketamine:  mg  Zofran:   mg    Toradol: 30 mg  Lidocaine:  mg  Caffeine Benzoate: mg       No flowsheet data found.     SIGNED:    Darling Dumont MD  7/26/2023

## 2023-07-26 NOTE — DISCHARGE INSTRUCTIONS
ECT DISCHARGE INSTRUCTIONS      NAME: Ailyn Kendall   :  1955   MRN:  [id number]     Date/Time:  2023 8:13 AM    ADMIT DATE: 2023     DISCHARGE DATE: 2023     DISCHARGE DIAGNOSIS:  [unfilled]     Recommended diet:  {diet:44041}    Recommended activity: {discharge activity:29076}    Have a responsible person stay with the patient for 24 hours after the treatment, some temporary confusion may be noticed. Do not allow a patient to operate a motor vehicle for at least 24 hours and all the confusion has cleared. Do not drink alcoholic beverages for 48 hours past treatment. Do not sign any legal documents. Do not make any critical decisions for 24 hours. Advance diet as tolerated. Start with liquids and advance it nausea is not present. Understand that memory for recent events, phone numbers, addresses, ect. May be decreased for a short period of time. Report any persistant nausea or pain to the treating physician. Patient receiving ECT while in the hospital should also not attempt to ambulate without assistance, please use tap bell which will be provide. If you have questions regarding the hospital related prescriptions or hospital related issues please call the [address] [place] [address] at [facility] [phone]. If you experience any of the following symptoms then please call your primary care physician/outpatient psychiatrist or return to the emergency room if you cannot get hold of your doctor: Fever, chills, nausea, vomiting, diarrhea, change in mentation, falling, bleeding, shortness of breath. Follow Up:  Continue outpatient psychiatric follow-up visits with personal psychiatrist.  Return for next ECT treatment in ***        Information obtained by :  I understand that if any problems occur once I am at home I am to contact my physician. I understand and acknowledge receipt of the instructions indicated above.

## 2023-07-26 NOTE — PERIOP NOTE
[facility]ustin [facility]  1955  [id number]    Situation:  Verbal report given from: josias  Procedure: Procedure(s):  ELECTROCONVULSIVE THERAPY    Background:    Preoperative diagnosis: Major depressive disorder, recurrent episode, moderate (HCC) [F33.1]    Postoperative diagnosis: * No post-op diagnosis entered *    :  Dr. Kaci Savage    Assistant(s): Circulator: Mary De La O RN  Scrub Person First: Nadia Romero    Specimens: * No specimens in log *    Assessment:  Intra-procedure medications         Anesthesia gave intra-procedure sedation and medications, see anesthesia flow sheet     Intravenous fluids: LR@ KVO     Vital signs stable yes      Recommendation:    Permission to share finding with

## 2023-07-26 NOTE — ANESTHESIA POSTPROCEDURE EVALUATION
Department of Anesthesiology  Postprocedure Note    Patient: Saima Hargrove  MRN: [id number]  YOB: 1955  Date of evaluation: 7/26/2023      Procedure Summary     Date: 07/26/23 Room / Location: [facility] OR R2 / [facility] OR    Anesthesia Start: 0803 Anesthesia Stop: 0124    Procedure: ELECTROCONVULSIVE THERAPY (Head) Diagnosis:       Major depressive disorder, recurrent episode, moderate (HCC)      (Major depressive disorder, recurrent episode, moderate ([address]) [F33.1])    Surgeons: Earl Pro MD Responsible Provider: Irma Lopez MD    Anesthesia Type: general ASA Status: 2          Anesthesia Type: No value filed.     Virgen Phase I: Virgen Score: 10    Virgen Phase II:        Anesthesia Post Evaluation    Patient location during evaluation: PACU  Patient participation: waiting for patient participation  Level of consciousness: awake and alert  Airway patency: patent  Nausea & Vomiting: no vomiting and no nausea  Complications: no  Cardiovascular status: hemodynamically stable  Respiratory status: acceptable  Hydration status: stable

## 2023-07-26 NOTE — PERIOP NOTE
Patient meets discharge criteria. Patient and ms long provided with verbal and written discharge instructions. Patient discharged by wheelchair with ms long  to transport home.

## 2023-08-04 ENCOUNTER — ANESTHESIA EVENT (OUTPATIENT)
Facility: HOSPITAL | Age: 68
End: 2023-08-04
Payer: COMMERCIAL

## 2023-08-08 ENCOUNTER — ANESTHESIA (OUTPATIENT)
Facility: HOSPITAL | Age: 68
End: 2023-08-08
Payer: COMMERCIAL

## 2023-08-08 ENCOUNTER — HOSPITAL ENCOUNTER (OUTPATIENT)
Facility: HOSPITAL | Age: 68
Setting detail: OUTPATIENT SURGERY
Discharge: HOME | End: 2023-08-08
Attending: PSYCHIATRY & NEUROLOGY | Admitting: PSYCHIATRY & NEUROLOGY
Payer: COMMERCIAL

## 2023-08-08 VITALS
SYSTOLIC BLOOD PRESSURE: 140 MMHG | DIASTOLIC BLOOD PRESSURE: 82 MMHG | WEIGHT: 115 LBS | TEMPERATURE: 97.9 F | HEART RATE: 77 BPM | RESPIRATION RATE: 16 BRPM | BODY MASS INDEX: 18.05 KG/M2 | OXYGEN SATURATION: 96 % | HEIGHT: 67 IN

## 2023-08-08 PROCEDURE — 90870 ELECTROCONVULSIVE THERAPY: CPT | Performed by: PSYCHIATRY & NEUROLOGY

## 2023-08-08 PROCEDURE — 7100000001 HC PACU RECOVERY - ADDTL 15 MIN: Performed by: PSYCHIATRY & NEUROLOGY

## 2023-08-08 PROCEDURE — 2580000003 HC RX 258: Performed by: ANESTHESIOLOGY

## 2023-08-08 PROCEDURE — 3700000000 HC ANESTHESIA ATTENDED CARE: Performed by: PSYCHIATRY & NEUROLOGY

## 2023-08-08 PROCEDURE — 7100000000 HC PACU RECOVERY - FIRST 15 MIN: Performed by: PSYCHIATRY & NEUROLOGY

## 2023-08-08 PROCEDURE — 2709999900 HC NON-CHARGEABLE SUPPLY: Performed by: PSYCHIATRY & NEUROLOGY

## 2023-08-08 PROCEDURE — 7100000010 HC PHASE II RECOVERY - FIRST 15 MIN: Performed by: PSYCHIATRY & NEUROLOGY

## 2023-08-08 PROCEDURE — 6360000002 HC RX W HCPCS: Performed by: ANESTHESIOLOGY

## 2023-08-08 RX ORDER — SODIUM CHLORIDE 0.9 % (FLUSH) 0.9 %
5-40 SYRINGE (ML) INJECTION PRN
Status: DISCONTINUED | OUTPATIENT
Start: 2023-08-08 | End: 2023-08-08 | Stop reason: HOSPADM

## 2023-08-08 RX ORDER — SODIUM CHLORIDE 0.9 % (FLUSH) 0.9 %
5-40 SYRINGE (ML) INJECTION EVERY 12 HOURS SCHEDULED
Status: DISCONTINUED | OUTPATIENT
Start: 2023-08-08 | End: 2023-08-08 | Stop reason: HOSPADM

## 2023-08-08 RX ORDER — SODIUM CHLORIDE 9 MG/ML
INJECTION, SOLUTION INTRAVENOUS CONTINUOUS
Status: DISCONTINUED | OUTPATIENT
Start: 2023-08-08 | End: 2023-08-08 | Stop reason: HOSPADM

## 2023-08-08 RX ORDER — SODIUM CHLORIDE 9 MG/ML
INJECTION, SOLUTION INTRAVENOUS PRN
Status: DISCONTINUED | OUTPATIENT
Start: 2023-08-08 | End: 2023-08-08 | Stop reason: HOSPADM

## 2023-08-08 RX ORDER — SODIUM CHLORIDE, SODIUM LACTATE, POTASSIUM CHLORIDE, CALCIUM CHLORIDE 600; 310; 30; 20 MG/100ML; MG/100ML; MG/100ML; MG/100ML
INJECTION, SOLUTION INTRAVENOUS CONTINUOUS PRN
Status: DISCONTINUED | OUTPATIENT
Start: 2023-08-08 | End: 2023-08-08 | Stop reason: SDUPTHER

## 2023-08-08 RX ORDER — KETOROLAC TROMETHAMINE 30 MG/ML
INJECTION, SOLUTION INTRAMUSCULAR; INTRAVENOUS PRN
Status: DISCONTINUED | OUTPATIENT
Start: 2023-08-08 | End: 2023-08-08 | Stop reason: SDUPTHER

## 2023-08-08 RX ADMIN — KETOROLAC TROMETHAMINE 30 MG: 30 INJECTION INTRAMUSCULAR; INTRAVENOUS at 08:27

## 2023-08-08 RX ADMIN — SODIUM CHLORIDE, POTASSIUM CHLORIDE, SODIUM LACTATE AND CALCIUM CHLORIDE: 600; 310; 30; 20 INJECTION, SOLUTION INTRAVENOUS at 08:32

## 2023-08-08 ASSESSMENT — PAIN - FUNCTIONAL ASSESSMENT
PAIN_FUNCTIONAL_ASSESSMENT: NONE - DENIES PAIN
PAIN_FUNCTIONAL_ASSESSMENT: ADULT NONVERBAL PAIN SCALE (NPVS)
PAIN_FUNCTIONAL_ASSESSMENT: NONE - DENIES PAIN
PAIN_FUNCTIONAL_ASSESSMENT: NONE - DENIES PAIN

## 2023-08-08 NOTE — PERIOP NOTE
Patient meets discharge criteria. Patient and friend provided with verbal and written discharge instructions. Patient discharged by wheelchair with Des Lynch to transport home.

## 2023-08-08 NOTE — PROCEDURES
ECT PROCEDURE NOTE      IDENTIFICATION:    Patient Name  Jc Ernst   Date of Birth 1955   [facility] [id number]   Medical Record Number  [id number]      Age  78 y.o. PCP Alessandro Smith MD   Admit date:  8/8/2023    Room Number  OR/PL  @ [facility]   Date of Service  8/8/2023            Electro Convulsive Therapy:  Treatment number 11        Maintenance ECT - YES   Laura Wade was admitted to the PACU for ECT. Patient was medically cleared and NPO for procedure. Patient is not court mandated and gave informed consent for ECT treatment. Patient received     Bilateral ECT - YES    Administered using the [address] [place] [address]. at 40 % Energy, under general anesthesia. Laura Wade with a good, well generalized seizure of 37 seconds on observation of the motor manifestations of the seizure. EEG duration was 60 secs. Post-Ictal Suppression Index: <10 %  Recovery was unremarkable and with no complications. Change in Energy %:            Anesthesia medications administered during procedure:  Brevital:  40 mg  Change for next treament:     Succinylcholine: 30 mg  Change for next treatment:      Propofol: mg  Glycopyrrolate:  mg  Labetalol:  mg  Esmolol:  mg  Lorazepam:  mg  Ketamine:  mg  Zofran:   mg    Toradol: 30 mg  Lidocaine:  mg  Caffeine Benzoate: mg       No flowsheet data found.     SIGNED:    Ted Arriaga MD  8/8/2023

## 2023-08-08 NOTE — DISCHARGE INSTRUCTIONS
physician. I understand and acknowledge receipt of the instructions indicated above. Physician's or R.N.'s Signature                                                             Date/Time                                                                                                                                              Patient or Representative Signature                                                     Date/Time                                                                         DISCHARGE SUMMARY from Nurse    PATIENT INSTRUCTIONS:    After general anesthesia or intravenous sedation, for 24 hours or while taking prescription Narcotics:  Limit your activities  Do not drive and operate hazardous machinery  Do not make important personal or business decisions  Do  not drink alcoholic beverages  If you have not urinated within 8 hours after discharge, please contact your surgeon on call. Report the following to your surgeon:  Excessive pain, swelling, redness or odor of or around the surgical area  Temperature over 100.5  Nausea and vomiting lasting longer than 4 hours or if unable to take medications  Any signs of decreased circulation or nerve impairment to extremity: change in color, persistent  numbness, tingling, coldness or increase pain  Any questions    What to do at Home:  Recommended activity: {discharge activity:89903}, ***    If you experience any of the following symptoms ***, please follow up with ***. *  Please give a list of your current medications to your Primary Care Provider. *  Please update this list whenever your medications are discontinued, doses are      changed, or new medications (including over-the-counter products) are added. *  Please carry medication information at all times in case of emergency situations.     These are

## 2023-08-08 NOTE — ANESTHESIA POSTPROCEDURE EVALUATION
Department of Anesthesiology  Postprocedure Note    Patient: Rupa Earl  MRN: [id number]  YOB: 1955  Date of evaluation: 8/8/2023      Procedure Summary     Date: 08/08/23 Room / Location: [facility] OR R2 / [facility] OR    Anesthesia Start: 5160 Anesthesia Stop: 0322    Procedure: ELECTROCONVULSIVE THERAPY (Head) Diagnosis:       Major depressive disorder, recurrent episode, moderate (HCC)      (Major depressive disorder, recurrent episode, moderate ([address]) [F33.1])    Surgeons: Valentina Clay MD Responsible Provider: Keira Keenan MD    Anesthesia Type: general ASA Status: 2          Anesthesia Type: No value filed.     Virgen Phase I: Virgen Score: 10    Virgen Phase II: Virgen Score: 10      Anesthesia Post Evaluation    Patient location during evaluation: PACU  Patient participation: complete - patient participated  Level of consciousness: awake and alert  Pain score: 0  Airway patency: patent  Nausea & Vomiting: no nausea and no vomiting  Complications: no  Cardiovascular status: blood pressure returned to baseline  Respiratory status: acceptable  Hydration status: euvolemic  Pain management: adequate

## 2023-08-25 ENCOUNTER — ANESTHESIA EVENT (OUTPATIENT)
Facility: HOSPITAL | Age: 68
End: 2023-08-25
Payer: COMMERCIAL

## 2023-08-30 ENCOUNTER — HOSPITAL ENCOUNTER (OUTPATIENT)
Facility: HOSPITAL | Age: 68
Setting detail: OUTPATIENT SURGERY
Discharge: HOME | End: 2023-08-30
Attending: PSYCHIATRY & NEUROLOGY | Admitting: PSYCHIATRY & NEUROLOGY
Payer: COMMERCIAL

## 2023-08-30 ENCOUNTER — ANESTHESIA (OUTPATIENT)
Facility: HOSPITAL | Age: 68
End: 2023-08-30
Payer: COMMERCIAL

## 2023-08-30 VITALS
HEIGHT: 67 IN | HEART RATE: 68 BPM | OXYGEN SATURATION: 95 % | TEMPERATURE: 98.1 F | SYSTOLIC BLOOD PRESSURE: 140 MMHG | BODY MASS INDEX: 18.05 KG/M2 | WEIGHT: 115 LBS | DIASTOLIC BLOOD PRESSURE: 81 MMHG | RESPIRATION RATE: 16 BRPM

## 2023-08-30 PROCEDURE — 7100000000 HC PACU RECOVERY - FIRST 15 MIN: Performed by: PSYCHIATRY & NEUROLOGY

## 2023-08-30 PROCEDURE — 7100000011 HC PHASE II RECOVERY - ADDTL 15 MIN: Performed by: PSYCHIATRY & NEUROLOGY

## 2023-08-30 PROCEDURE — 90870 ELECTROCONVULSIVE THERAPY: CPT | Performed by: PSYCHIATRY & NEUROLOGY

## 2023-08-30 PROCEDURE — 7100000010 HC PHASE II RECOVERY - FIRST 15 MIN: Performed by: PSYCHIATRY & NEUROLOGY

## 2023-08-30 PROCEDURE — 2709999900 HC NON-CHARGEABLE SUPPLY: Performed by: PSYCHIATRY & NEUROLOGY

## 2023-08-30 PROCEDURE — 2500000003 HC RX 250 WO HCPCS: Performed by: ANESTHESIOLOGY

## 2023-08-30 PROCEDURE — 3700000000 HC ANESTHESIA ATTENDED CARE: Performed by: PSYCHIATRY & NEUROLOGY

## 2023-08-30 PROCEDURE — 6360000002 HC RX W HCPCS: Performed by: ANESTHESIOLOGY

## 2023-08-30 RX ORDER — SODIUM CHLORIDE 9 MG/ML
INJECTION, SOLUTION INTRAVENOUS CONTINUOUS
Status: DISCONTINUED | OUTPATIENT
Start: 2023-08-30 | End: 2023-08-30 | Stop reason: HOSPADM

## 2023-08-30 RX ORDER — SODIUM CHLORIDE 0.9 % (FLUSH) 0.9 %
5-40 SYRINGE (ML) INJECTION PRN
Status: DISCONTINUED | OUTPATIENT
Start: 2023-08-30 | End: 2023-08-30 | Stop reason: HOSPADM

## 2023-08-30 RX ORDER — SODIUM CHLORIDE 9 MG/ML
INJECTION, SOLUTION INTRAVENOUS PRN
Status: DISCONTINUED | OUTPATIENT
Start: 2023-08-30 | End: 2023-08-30 | Stop reason: HOSPADM

## 2023-08-30 RX ORDER — SODIUM CHLORIDE 0.9 % (FLUSH) 0.9 %
5-40 SYRINGE (ML) INJECTION EVERY 12 HOURS SCHEDULED
Status: DISCONTINUED | OUTPATIENT
Start: 2023-08-30 | End: 2023-08-30 | Stop reason: HOSPADM

## 2023-08-30 RX ORDER — KETOROLAC TROMETHAMINE 30 MG/ML
INJECTION, SOLUTION INTRAMUSCULAR; INTRAVENOUS PRN
Status: DISCONTINUED | OUTPATIENT
Start: 2023-08-30 | End: 2023-08-30 | Stop reason: SDUPTHER

## 2023-08-30 RX ORDER — SUCCINYLCHOLINE/SOD CL,ISO/PF 100 MG/5ML
SYRINGE (ML) INTRAVENOUS PRN
Status: DISCONTINUED | OUTPATIENT
Start: 2023-08-30 | End: 2023-08-30 | Stop reason: SDUPTHER

## 2023-08-30 RX ADMIN — Medication 40 MG: at 07:42

## 2023-08-30 RX ADMIN — KETOROLAC TROMETHAMINE 30 MG: 30 INJECTION INTRAMUSCULAR; INTRAVENOUS at 07:40

## 2023-08-30 RX ADMIN — Medication 30 MG: at 07:42

## 2023-08-30 NOTE — DISCHARGE INSTRUCTIONS
ECT DISCHARGE INSTRUCTIONS      NAME: Shaquille Torres   :  1955   MRN:  [id number]     Date/Time:  2023 7:47 AM    ADMIT DATE: 2023     DISCHARGE DATE: 2023     DISCHARGE DIAGNOSIS:  [unfilled]     Recommended diet:  regular diet    Recommended activity: activity as tolerated and no driving for today    Have a responsible person stay with the patient for 24 hours after the treatment, some temporary confusion may be noticed. Do not allow a patient to operate a motor vehicle for at least 24 hours and all the confusion has cleared. Do not drink alcoholic beverages for 48 hours past treatment. Do not sign any legal documents. Do not make any critical decisions for 24 hours. Advance diet as tolerated. Start with liquids and advance it nausea is not present. Understand that memory for recent events, phone numbers, addresses, ect. May be decreased for a short period of time. Report any persistant nausea or pain to the treating physician. Patient receiving ECT while in the hospital should also not attempt to ambulate without assistance, please use tap bell which will be provide. If you have questions regarding the hospital related prescriptions or hospital related issues please call the [address] [place] [address] at [facility] [phone]. If you experience any of the following symptoms then please call your primary care physician/outpatient psychiatrist or return to the emergency room if you cannot get hold of your doctor: Fever, chills, nausea, vomiting, diarrhea, change in mentation, falling, bleeding, shortness of breath. Follow Up:  Continue outpatient psychiatric follow-up visits with personal psychiatrist.  Return for next ECT treatment in 2023        Information obtained by :  I understand that if any problems occur once I am at home I am to contact my physician.     I understand and acknowledge receipt of the symptoms:  Weight gain of 3 pounds or more overnight or 5 pounds in a week, increased swelling in our hands or feet or shortness of breath while lying flat in bed. Please call your doctor as soon as you notice any of these symptoms; do not wait until your next office visit. The discharge information has been reviewed with the patient and friend. The patient and friend verbalized understanding. Discharge medications reviewed with the patient and friend and appropriate educational materials and side effects teaching were provided.   ___________________________________________________________________________________________________________________________________

## 2023-08-30 NOTE — PERIOP NOTE
Linda Millmont  1955  [id number]    Situation:  Verbal report given from: Jami Toscano and Dr. Claudette Plenty  Procedure: Procedure(s):  ELECTROCONVULSIVE THERAPY    Background:    Preoperative diagnosis: Major depressive disorder, recurrent episode, moderate (HCC) [F33.1]    Postoperative diagnosis: * No post-op diagnosis entered *    :  Dr. Clyde Chew    Assistant(s): Circulator: Shayy Nation RN  Scrub Person First: Judge Cao; Andreas Spencer RN    Specimens: * No specimens in log *    Assessment:  Intra-procedure medications         Anesthesia gave intra-procedure sedation and medications, see anesthesia flow sheet     Intravenous fluids: LR@ KVO     Vital signs stable

## 2023-08-30 NOTE — PROCEDURES
ECT PROCEDURE NOTE      IDENTIFICATION:    Patient Name  Saima Hargrove   Date of Birth 1955   [facility] [id number]   Medical Record Number  [id number]      Age  78 y.o. PCP Eduardo Isaac MD   Admit date:  8/30/2023    Room Number  OR/PL  @ [facility]   Date of Service  8/30/2023            Electro Convulsive Therapy:  Treatment number 12        Maintenance ECT - YES   Laura Wade was admitted to the PACU for ECT. Patient was medically cleared and NPO for procedure. Patient is not court mandated and gave informed consent for ECT treatment. Patient received     Bilateral ECT - YES    Administered using the [address] [place] [address]. at 40 % Energy, under general anesthesia. Laura Wade with a good, well generalized seizure of 69 seconds on observation of the motor manifestations of the seizure. EEG duration was NA secs. Post-Ictal Suppression Index: NA %  Recovery was unremarkable and with no complications. Change in Energy %:            Anesthesia medications administered during procedure:  Brevital:  40 mg  Change for next treament:     Succinylcholine: 30 mg  Change for next treatment:      Propofol: mg  Glycopyrrolate:  mg  Labetalol:  mg  Esmolol:  mg  Lorazepam:  mg  Ketamine:  mg  Zofran:   mg    Toradol: 30 mg  Lidocaine:  mg  Caffeine Benzoate: mg       No flowsheet data found.     SIGNED:    Earl Pro MD  8/30/2023

## 2023-08-30 NOTE — ANESTHESIA POSTPROCEDURE EVALUATION
Department of Anesthesiology  Postprocedure Note    Patient: Wilbert Smith  MRN: [id number]  YOB: 1955  Date of evaluation: 8/30/2023      Procedure Summary     Date: 08/30/23 Room / Location: [facility] OR R2 / [facility] OR    Anesthesia Start: 5018 Anesthesia Stop: 5724    Procedure: ELECTROCONVULSIVE THERAPY (Head) Diagnosis:       Major depressive disorder, recurrent episode, moderate (HCC)      (Major depressive disorder, recurrent episode, moderate ([address]) [F33.1])    Surgeons: Awa Corona MD Responsible Provider: Niko Bowers MD    Anesthesia Type: general ASA Status: 2          Anesthesia Type: No value filed.     Virgen Phase I: Virgen Score: 10    Virgen Phase II:        Anesthesia Post Evaluation    Patient location during evaluation: PACU  Patient participation: complete - patient participated  Level of consciousness: awake  Airway patency: patent  Nausea & Vomiting: no vomiting and no nausea  Complications: no  Cardiovascular status: hemodynamically stable  Respiratory status: acceptable  Hydration status: stable  Pain management: adequate

## (undated) DEVICE — ELECTRODE EKG PED PREGELLED FOAM

## (undated) DEVICE — GEL ELECTRD 8.5OZ HIGHLY COND BACTSTAT H2O SOL NONSTAINING